# Patient Record
Sex: MALE | Race: WHITE | NOT HISPANIC OR LATINO | Employment: OTHER | URBAN - METROPOLITAN AREA
[De-identification: names, ages, dates, MRNs, and addresses within clinical notes are randomized per-mention and may not be internally consistent; named-entity substitution may affect disease eponyms.]

---

## 2017-01-05 ENCOUNTER — HOSPITAL ENCOUNTER (OUTPATIENT)
Dept: SPEECH THERAPY | Facility: CLINIC | Age: 77
Setting detail: THERAPIES SERIES
End: 2017-01-05
Attending: PSYCHIATRY & NEUROLOGY
Payer: MEDICARE

## 2017-01-05 PROCEDURE — 40000211 ZZHC STATISTIC SLP  DEPARTMENT VISIT: Performed by: SPEECH-LANGUAGE PATHOLOGIST

## 2017-01-05 PROCEDURE — 92526 ORAL FUNCTION THERAPY: CPT | Mod: GN | Performed by: SPEECH-LANGUAGE PATHOLOGIST

## 2017-01-05 PROCEDURE — 92507 TX SP LANG VOICE COMM INDIV: CPT | Mod: GN | Performed by: SPEECH-LANGUAGE PATHOLOGIST

## 2017-01-06 ENCOUNTER — HOSPITAL ENCOUNTER (OUTPATIENT)
Dept: SPEECH THERAPY | Facility: CLINIC | Age: 77
Setting detail: THERAPIES SERIES
End: 2017-01-06
Attending: PSYCHIATRY & NEUROLOGY
Payer: MEDICARE

## 2017-01-06 PROCEDURE — G9171 VOICE CURRENT STATUS: HCPCS | Mod: GN,CI | Performed by: SPEECH-LANGUAGE PATHOLOGIST

## 2017-01-06 PROCEDURE — G9172 VOICE GOAL STATUS: HCPCS | Mod: GN,CI | Performed by: SPEECH-LANGUAGE PATHOLOGIST

## 2017-01-06 PROCEDURE — 40000211 ZZHC STATISTIC SLP  DEPARTMENT VISIT: Performed by: SPEECH-LANGUAGE PATHOLOGIST

## 2017-01-06 PROCEDURE — 92507 TX SP LANG VOICE COMM INDIV: CPT | Mod: GN | Performed by: SPEECH-LANGUAGE PATHOLOGIST

## 2017-01-06 PROCEDURE — G9173 VOICE D/C STATUS: HCPCS | Mod: GN,CI | Performed by: SPEECH-LANGUAGE PATHOLOGIST

## 2017-01-06 NOTE — PROGRESS NOTES
"Outpatient Speech Language Pathology Discharge Note     Patient: Dylan Davila  : 1940    Beginning/End Dates of Reporting Period:  16 to 2017, 3 sessions since last progress note (13 sessions total during treatment period)    Referring Provider: Dr. Jose Juan Vela    Therapy Diagnosis: Voice disorder, dysphagia    Client Self Report: Patient stated, \"I feel like I have found my voice again.\"      Objective Measurements: See below:      Swallow Goals:  Goal Identifier     Goal Description Pt will complete oral pharyngeal exercises 3x/every other day independently to increase safety for PO intake.    Target Date 17   Date Met  17   Progress: Goal met and discharged. Patient is completing exercises daily as recommended.      Goal Identifier     Goal Description Patient will learn, implement, and demonstrate use of 3 safe swallow strategies independently to increase safety for PO intake.    Target Date 17   Date Met  17   Progress: Goal met and discharged. Pt is able to verbalize understanding and reports independent use of 3+ safe swallow strategies. He reports reduced coughing with PO intake as a result.      Voice Goals:  Goal Identifier     Goal Description Patient will increase vocal loudness to reach a target sound pressure level of >70 dB SPL on a complex structured speech task (e.g. paragraph reading) with min cues from SLP to increase vocal respiratory support required for functional communication.    Target Date 17   Date Met  17   Progress: Goal met and dismissed. Patient read aloud for 10 mins in final session at an average volume of 71 dB with no cues from SLP for loudness.      Goal Identifier     Goal Description Patient will increase vocal loudness to reach a target sound pressure level of >70 dB SPL on a complex unstructured speech task (e.g. 5-10 min conversation) with min cues from SLP to increase vocal respiratory support required for " functional communication.    Target Date 03/20/17   Date Met  01/06/17   Progress: Goal met and dismissed. Patient able to communicate in complex, spontaneous speech tasks (e.g. Conversation about politics) for up to 10 minutes at an average volume of 69 dB with no cues, increasing to 70dB with min cues from SLP.      Goal Identifier     Goal Description Patient will learn and implement 3 strategies to improve overall speech intelligibilty to >95% with min cues to increase communication ability for everyday speech tasks.     Target Date 03/20/17   Date Met  01/06/17   Progress: Patient's speech intelligibility informally rated by this clinician at 100% across the last 3 sessions with no cues from SLP.      Progress Toward Goals:    Progress this reporting period: Excellent. Since starting the LSVT Loud program, Mr. Davila has made excellent progress toward his voice goals. He was highly motivated in treatment sessions and completed home program exercises as directed. Patient has increased the endurance of his voice and improved vocal volume and speech clarity through LSVT training and consistent home practice. At time of discharge, average conversational volume was 69dB with no cues, increasing to 70dB with min cues across multiple complex spontaneous speech tasks. This represents an improvement over his average conversational volume of 64dB at treatment start.  Patient has increased his reading volume from 67dB at treatment start to 71dB at treatment end.  He reports improved ability to communicate in home and community settings with both familiar and unfamiliar listeners. With regard to swallowing function, patient has been completing his home exercises as directed and following through on safe swallow recommendations from SLP.  Swallowing goals were also met during treatment period. Patient completed home exercises for oralpharyngeal strengthening as directed and reported at d/c overall reduction in coughing with  PO intake.     Plan: Discharge from therapy.    Discharge: Yes.     Reason for Discharge: Patient has met all goals.    Discharge Plan: Patient to continue home program for maintenance of vocal and swallowing strength gained during treatment period. Pt advised to contact neurologist or SLP in the future if he notes increased difficulty or significant change to voice or swallow.     Thank you for this referral.  It was a pleasure to work with Ghulam on his goals.     Sabrina Arias MA, CCC-SLP  Speech-Language Pathology  [facility]  Phone: [phone]  Pager: [phone]

## 2017-02-21 DIAGNOSIS — G47.00 PERSISTENT INSOMNIA: ICD-10-CM

## 2017-02-21 DIAGNOSIS — F13.20 SEDATIVE, HYPNOTIC OR ANXIOLYTIC DEPENDENCE (H): Primary | ICD-10-CM

## 2017-02-21 NOTE — TELEPHONE ENCOUNTER
zolpidem      Last Written Prescription Date:  09/15/16  Last Fill Quantity: 30,   # refills: 5  Last Office Visit with [facility], [facility] or [facility] prescribing provider: 09/27/16  Future Office visit:       Routing refill request to provider for review/approval because:  Drug is a controlled substance

## 2017-03-13 PROBLEM — F13.20 SEDATIVE, HYPNOTIC OR ANXIOLYTIC DEPENDENCE (H): Status: ACTIVE | Noted: 2017-03-13

## 2017-03-13 RX ORDER — ZOLPIDEM TARTRATE 5 MG/1
2.5-5 TABLET ORAL
Qty: 30 TABLET | Refills: 5 | Status: SHIPPED | OUTPATIENT
Start: 2017-03-14 | End: 2017-08-17

## 2017-03-13 NOTE — TELEPHONE ENCOUNTER
rx zolpidem ambien 5mg faxed to [facility] FAX: [phone]

## 2017-08-17 DIAGNOSIS — G47.00 PERSISTENT INSOMNIA: ICD-10-CM

## 2017-08-17 DIAGNOSIS — F13.20 SEDATIVE, HYPNOTIC OR ANXIOLYTIC DEPENDENCE (H): ICD-10-CM

## 2017-08-17 NOTE — TELEPHONE ENCOUNTER
Controlled Substance Refill Request for Ambien  Last refill: 3/14/17  #30 RF 5  Last clinic visit: 9-

## 2017-08-18 RX ORDER — ZOLPIDEM TARTRATE 5 MG/1
TABLET ORAL
Qty: 30 TABLET | Refills: 1 | Status: SHIPPED | OUTPATIENT
Start: 2017-08-18 | End: 2017-10-13

## 2017-10-13 ENCOUNTER — OFFICE VISIT (OUTPATIENT)
Dept: INTERNAL MEDICINE | Facility: CLINIC | Age: 77
End: 2017-10-13
Payer: COMMERCIAL

## 2017-10-13 VITALS
OXYGEN SATURATION: 98 % | HEIGHT: 74 IN | SYSTOLIC BLOOD PRESSURE: 122 MMHG | TEMPERATURE: 98.1 F | HEART RATE: 75 BPM | WEIGHT: 199.5 LBS | BODY MASS INDEX: 25.6 KG/M2 | DIASTOLIC BLOOD PRESSURE: 78 MMHG

## 2017-10-13 DIAGNOSIS — E78.5 HYPERLIPIDEMIA LDL GOAL <100: ICD-10-CM

## 2017-10-13 DIAGNOSIS — Z23 NEED FOR PROPHYLACTIC VACCINATION AND INOCULATION AGAINST INFLUENZA: ICD-10-CM

## 2017-10-13 DIAGNOSIS — Z00.00 MEDICARE ANNUAL WELLNESS VISIT, SUBSEQUENT: Primary | ICD-10-CM

## 2017-10-13 DIAGNOSIS — M25.512 ACUTE PAIN OF LEFT SHOULDER: ICD-10-CM

## 2017-10-13 DIAGNOSIS — F51.04 CHRONIC INSOMNIA: ICD-10-CM

## 2017-10-13 DIAGNOSIS — F13.20 SEDATIVE, HYPNOTIC OR ANXIOLYTIC DEPENDENCE (H): ICD-10-CM

## 2017-10-13 DIAGNOSIS — R93.1 ABNORMAL CARDIAC CT ANGIOGRAPHY: ICD-10-CM

## 2017-10-13 DIAGNOSIS — G20.A1 PARKINSON'S DISEASE (H): ICD-10-CM

## 2017-10-13 LAB
ANION GAP SERPL CALCULATED.3IONS-SCNC: 5 MMOL/L (ref 3–14)
BUN SERPL-MCNC: 20 MG/DL (ref 7–30)
CALCIUM SERPL-MCNC: 9.9 MG/DL (ref 8.5–10.1)
CHLORIDE SERPL-SCNC: 105 MMOL/L (ref 94–109)
CHOLEST SERPL-MCNC: 140 MG/DL
CO2 SERPL-SCNC: 30 MMOL/L (ref 20–32)
CREAT SERPL-MCNC: 1.21 MG/DL (ref 0.66–1.25)
GFR SERPL CREATININE-BSD FRML MDRD: 58 ML/MIN/1.7M2
GLUCOSE SERPL-MCNC: 91 MG/DL (ref 70–99)
HDLC SERPL-MCNC: 51 MG/DL
LDLC SERPL CALC-MCNC: 69 MG/DL
NONHDLC SERPL-MCNC: 89 MG/DL
POTASSIUM SERPL-SCNC: 4.2 MMOL/L (ref 3.4–5.3)
SODIUM SERPL-SCNC: 140 MMOL/L (ref 133–144)
TRIGL SERPL-MCNC: 101 MG/DL

## 2017-10-13 PROCEDURE — 80061 LIPID PANEL: CPT | Performed by: INTERNAL MEDICINE

## 2017-10-13 PROCEDURE — G0008 ADMIN INFLUENZA VIRUS VAC: HCPCS | Performed by: INTERNAL MEDICINE

## 2017-10-13 PROCEDURE — 36415 COLL VENOUS BLD VENIPUNCTURE: CPT | Performed by: INTERNAL MEDICINE

## 2017-10-13 PROCEDURE — 90662 IIV NO PRSV INCREASED AG IM: CPT | Performed by: INTERNAL MEDICINE

## 2017-10-13 PROCEDURE — 99397 PER PM REEVAL EST PAT 65+ YR: CPT | Mod: 25 | Performed by: INTERNAL MEDICINE

## 2017-10-13 PROCEDURE — 80048 BASIC METABOLIC PNL TOTAL CA: CPT | Performed by: INTERNAL MEDICINE

## 2017-10-13 RX ORDER — SILDENAFIL 50 MG/1
50 TABLET, FILM COATED ORAL
COMMUNITY
Start: 2012-04-18 | End: 2018-02-14

## 2017-10-13 RX ORDER — KETOCONAZOLE 20 MG/G
CREAM TOPICAL PRN
COMMUNITY
Start: 2017-07-10

## 2017-10-13 RX ORDER — ZOLPIDEM TARTRATE 6.25 MG/1
6.25 TABLET, FILM COATED, EXTENDED RELEASE ORAL
Qty: 30 TABLET | Refills: 5 | Status: SHIPPED | OUTPATIENT
Start: 2017-10-13 | End: 2018-03-20

## 2017-10-13 NOTE — NURSING NOTE
"Chief Complaint   Patient presents with     Physical       Initial /78  Pulse 75  Temp 98.1  F (36.7  C) (Oral)  Ht 6' 2\" (1.88 m)  Wt 199 lb 8 oz (90.5 kg)  SpO2 98%  BMI 25.61 kg/m2 Estimated body mass index is 25.61 kg/(m^2) as calculated from the following:    Height as of this encounter: 6' 2\" (1.88 m).    Weight as of this encounter: 199 lb 8 oz (90.5 kg).  Medication Reconciliation: complete    "

## 2017-10-13 NOTE — MR AVS SNAPSHOT
After Visit Summary   10/13/2017    Dylan Davila    MRN: [id number]           Patient Information     Date Of Birth          1940        Visit Information        Provider Department      10/13/2017 7:00 AM Dereck Lomeli MD [facility]        Today's Diagnoses     Medicare annual wellness visit, subsequent    -  1    Hyperlipidemia LDL goal <100        minimal calcified plaque on CV CT angio        Parkinson's disease (H)        Sedative, hypnotic or anxiolytic dependence (H)        Chronic insomnia        Acute pain of left shoulder          Care Instructions      Preventive Health Recommendations:       Male Ages 65 and over    Yearly exam:             See your health care provider every year in order to  o   Review health changes.   o   Discuss preventive care.    o   Review your medicines if your doctor has prescribed any.    Talk with your health care provider about whether you should have a test to screen for prostate cancer (PSA).    Every 3 years, have a diabetes test (fasting glucose). If you are at risk for diabetes, you should have this test more often.    Every 5 years, have a cholesterol test. Have this test more often if you are at risk for high cholesterol or heart disease.     Every 10 years, have a colonoscopy. Or, have a yearly FIT test (stool test). These exams will check for colon cancer.    Talk to with your health care provider about screening for Abdominal Aortic Aneurysm if you have a family history of AAA or have a history of smoking.  Shots:     Get a flu shot each year.     Get a tetanus shot every 10 years.     Talk to your doctor about your pneumonia vaccines. There are now two you should receive - Pneumovax (PPSV 23) and Prevnar (PCV 13).    Talk to your doctor about a shingles vaccine.     Talk to your doctor about the hepatitis B vaccine.  Nutrition:     Eat at least 5 servings of fruits and vegetables each day.     Eat  whole-grain bread, whole-wheat pasta and brown rice instead of white grains and rice.     Talk to your doctor about Calcium and Vitamin D.   Lifestyle    Exercise for at least 150 minutes a week (30 minutes a day, 5 days a week). This will help you control your weight and prevent disease.     Limit alcohol to one drink per day.     No smoking.     Wear sunscreen to prevent skin cancer.     See your dentist every six months for an exam and cleaning.     See your eye doctor every 1 to 2 years to screen for conditions such as glaucoma, macular degeneration and cataracts.          Follow-ups after your visit        Additional Services     [facility] PT, HAND, AND CHIROPRACTIC REFERRAL       **This order will print in the [facility] Scheduling Office**    Physical Therapy, Hand Therapy and Chiropractic Care are available through:    *[facility]  *[facility]  *[facility]    Call one number to schedule at any of the above locations: [phone].    Your provider has referred you to: Physical Therapy at [facility] or [facility]    Indication/Reason for Referral: Shoulder Pain  Onset of Illness: wks  Therapy Orders: Evaluate and Treat  Special Programs: None  Special Request: None    Binu Schmid      Additional Comments for the Therapist or Chiropractor:     Please be aware that coverage of these services is subject to the terms and limitations of your health insurance plan.  Call member services at your health plan with any benefit or coverage questions.      Please bring the following to your appointment:    *Your personal calendar for scheduling future appointments  *Comfortable clothing                  Your next 10 appointments already scheduled     Dec 06, 2017  8:20 AM CST   (Arrive by 8:00 AM)   Return Visit with Frandy Christiansen MD   [facility]    [address]  [address]  [facility] [id number] [phone]  "             Who to contact     If you have questions or need follow up information about today's clinic visit or your schedule please contact [facility] directly at [phone].  Normal or non-critical lab and imaging results will be communicated to you by MyChart, letter or phone within 4 business days after the clinic has received the results. If you do not hear from us within 7 days, please contact the clinic through MyChart or phone. If you have a critical or abnormal lab result, we will notify you by phone as soon as possible.  Submit refill requests through [employer] or call your pharmacy and they will forward the refill request to us. Please allow 3 business days for your refill to be completed.          Additional Information About Your Visit        fypiohart Information     [employer] gives you secure access to your electronic health record. If you see a primary care provider, you can also send messages to your care team and make appointments. If you have questions, please call your primary care clinic.  If you do not have a primary care provider, please call [phone] and they will assist you.        Care EveryWhere ID     This is your Care EveryWhere ID. This could be used by other organizations to access your [place] medical records  [id number]        Your Vitals Were     Pulse Temperature Height Pulse Oximetry BMI (Body Mass Index)       75 98.1  F (36.7  C) (Oral) 6' 2\" (1.88 m) 98% 25.61 kg/m2        Blood Pressure from Last 3 Encounters:   10/13/17 122/78   12/02/16 132/70   09/27/16 120/78    Weight from Last 3 Encounters:   10/13/17 199 lb 8 oz (90.5 kg)   12/02/16 205 lb (93 kg)   09/27/16 204 lb (92.5 kg)              We Performed the Following     Basic metabolic panel     [facility] PT, HAND, AND CHIROPRACTIC REFERRAL     Lipid panel reflex to direct LDL          Today's Medication Changes          These changes are accurate as of: 10/13/17  7:45 AM.  If you have any " questions, ask your nurse or doctor.               Start taking these medicines.        Dose/Directions    zolpidem 6.25 MG CR tablet   Commonly known as:  AMBIEN CR   Used for:  Sedative, hypnotic or anxiolytic dependence (H), Chronic insomnia   Replaces:  zolpidem 5 MG tablet   Started by:  Dereck Lomeli MD        Dose:  6.25 mg   Take 1 tablet (6.25 mg) by mouth nightly as needed for sleep   Quantity:  30 tablet   Refills:  5         Stop taking these medicines if you haven't already. Please contact your care team if you have questions.     zolpidem 5 MG tablet   Commonly known as:  AMBIEN   Replaced by:  zolpidem 6.25 MG CR tablet   Stopped by:  Dereck Lomeli MD                Where to get your medicines      Some of these will need a paper prescription and others can be bought over the counter.  Ask your nurse if you have questions.     Bring a paper prescription for each of these medications     zolpidem 6.25 MG CR tablet                Primary Care Provider Office Phone # Fax #    Dereck Lomeli [phone]       [address] [address]        Equal Access to Services     [facility] Hospital[facility]: Hadii lynda ku hadasho Soomaali, waaxda luqadaha, qaybta kaalmada adeegyada, wayne potter . So [facility] [phone].    ATENCIÓN: Si habla español, tiene a young disposición servicios gratuitos de asistencia lingüística. [facility] [phone].    We comply with applicable federal civil rights laws and [place] laws. We do not discriminate on the basis of race, color, national origin, age, disability, sex, sexual orientation, or gender identity.            Thank you!     Thank you for choosing [facility]  for your care. Our goal is always to provide you with excellent care. Hearing back from our patients is one way we can continue to improve our services. Please take a few minutes to complete the written survey that you may receive  in the mail after your visit with us. Thank you!             Your Updated Medication List - Protect others around you: Learn how to safely use, store and throw away your medicines at www.disposemymeds.org.          This list is accurate as of: 10/13/17  7:45 AM.  Always use your most recent med list.                   Brand Name Dispense Instructions for use Diagnosis    aspirin 81 MG tablet     100    1 tab po QD (Once per day)        atorvastatin 10 MG tablet    LIPITOR    90 tablet    Take 1 tablet (10 mg) by mouth At Bedtime    Hyperlipidemia LDL goal <100, Abnormal cardiac CT angiography       AVODART 0.5 MG capsule   Generic drug:  dutasteride     3 months    1 CAPSULE DAILY    Urinary sys symptom NEC       co-enzyme Q-10 50 MG Caps      Take  by mouth.        ketoconazole 2 % cream    NIZORAL          metroNIDAZOLE 0.75 % cream    METROCREAM          MULTIVITAMIN TABS   OR      1 tab qd        * sildenafil 100 MG tablet    VIAGRA    7 tablet    Take  by mouth daily as needed for erectile dysfunction. 0.5 to 1    Impotence of organic origin       * sildenafil 50 MG tablet    VIAGRA     Take 50 mg by mouth        zolpidem 6.25 MG CR tablet    AMBIEN CR    30 tablet    Take 1 tablet (6.25 mg) by mouth nightly as needed for sleep    Sedative, hypnotic or anxiolytic dependence (H), Chronic insomnia       * Notice:  This list has 2 medication(s) that are the same as other medications prescribed for you. Read the directions carefully, and ask your doctor or other care provider to review them with you.

## 2017-10-13 NOTE — PROGRESS NOTES
SUBJECTIVE:   Dylan Davila is a 76 year old male who presents for Preventive Visit.  Are you in the first 12 months of your Medicare coverage?  No    Physical   Annual:     Getting at least 3 servings of Calcium per day::  Yes    Bi-annual eye exam::  Yes    Dental care twice a year::  Yes    Sleep apnea or symptoms of sleep apnea::  None    Diet::  Regular (no restrictions)    Frequency of exercise::  2-3 days/week    Duration of exercise::  Less than 15 minutes    Taking medications regularly::  Yes    Medication side effects::  Not applicable    Additional concerns today::  YES      COGNITIVE SCREEN  1) Repeat 3 items (Banana, Sunrise, Chair)    2) Clock draw: NORMAL  3) 3 item recall: Recalls 3 objects  Results: 3 items recalled: COGNITIVE IMPAIRMENT LESS LIKELY    Mini-CogTM Copyright S Kiesha. Licensed by the author for use in [facility] Furn[employer]co[employer]uk; reprinted with permission (brant@[facility]). All rights reserved.          Reviewed and updated as needed this visit by clinical staff  Tobacco  Allergies  Meds  Problems       Reviewed and updated as needed this visit by Provider  Allergies  Meds  Problems        Social History   Substance Use Topics     Smoking status: Former Smoker     Years: 3.00     Types: Pipe     Quit date: 1/1/1975     Smokeless tobacco: Never Used     Alcohol use 0.0 oz/week     0 Standard drinks or equivalent per week      Comment: 1-2 glasses wine socially       The patient does not drink >3 drinks per day nor >7 drinks per week.      Today's PHQ-2 Score:   PHQ-2 ( 1999 Pfizer) 10/10/2017   Q1: Little interest or pleasure in doing things 0   Q2: Feeling down, depressed or hopeless 0   PHQ-2 Score 0   Q1: Little interest or pleasure in doing things Not at all   Q2: Feeling down, depressed or hopeless Not at all   PHQ-2 Score 0       Do you feel safe in your environment - Yes    Do you have a Health Care Directive?: No: Advance care planning reviewed with patient; information  "given to patient to review.      Current providers sharing in care for this patient include: Patient Care Team:  Dereck Lomeli MD as PCP - General (Internal Medicine)      Hearing impairment: No    Ability to successfully perform activities of daily living: Yes, no assistance needed     Fall risk:  Fallen 2 or more times in the past year?: No  Any fall with injury in the past year?: No      Home safety:  none identified      The following health maintenance items are reviewed in Epic and correct as of today:  Health Maintenance   Topic Date Due     AORTIC ANEURYSM SCREENING (SYSTEM ASSIGNED)  11/17/2006     FALL RISK ASSESSMENT  12/17/2016     ADVANCE DIRECTIVE PLANNING Q5 YRS  07/18/2017     TETANUS Q10 YR  08/14/2017     INFLUENZA VACCINE (SYSTEM ASSIGNED)  09/01/2017     ALT Q1 YR  09/27/2017     BMP Q1 YR  09/27/2017     LIPID MONITORING Q1 YEAR  09/27/2017     COLON CANCER SCREEN (SYSTEM ASSIGNED)  07/12/2021     PNEUMOCOCCAL  Completed             ROS:  Constitutional, HEENT, cardiovascular, pulmonary, gi and gu systems are negative, except as otherwise noted.  MSK- pain in L shoulder , w/movement     OBJECTIVE:   /78  Pulse 75  Temp 98.1  F (36.7  C) (Oral)  Ht 6' 2\" (1.88 m)  Wt 199 lb 8 oz (90.5 kg)  SpO2 98%  BMI 25.61 kg/m2 Estimated body mass index is 25.61 kg/(m^2) as calculated from the following:    Height as of this encounter: 6' 2\" (1.88 m).    Weight as of this encounter: 199 lb 8 oz (90.5 kg).  EXAM:   GENERAL: alert and no distress  EYES: Eyes grossly normal to inspection, PERRL and conjunctivae and sclerae normal  HENT: ear canals and TM's normal, nose and mouth without ulcers or lesions  NECK: no adenopathy, no asymmetry, masses, or scars and thyroid normal to palpation  RESP: lungs clear to auscultation - no rales, rhonchi or wheezes  CV: regular rate and rhythm, normal S1 S2, no S3 or S4, no murmur, click or rub, no peripheral edema and peripheral pulses strong  ABDOMEN: " soft, nontender, no hepatosplenomegaly, no masses and bowel sounds normal  MS: no gross musculoskeletal defects noted, no edema  SKIN: no suspicious lesions or rashes  NEURO: LUE cogwheel rigidity noted- a bit worse. Resting tremor on left. Gait normal.  sensory exam grossly normal and mentation intact  PSYCH: mentation appears normal, affect normal/bright    Results for orders placed or performed in visit on 10/13/17   Basic metabolic panel   Result Value Ref Range    Sodium 140 133 - 144 mmol/L    Potassium 4.2 3.4 - 5.3 mmol/L    Chloride 105 94 - 109 mmol/L    Carbon Dioxide 30 20 - 32 mmol/L    Anion Gap 5 3 - 14 mmol/L    Glucose 91 70 - 99 mg/dL    Urea Nitrogen 20 7 - 30 mg/dL    Creatinine 1.21 0.66 - 1.25 mg/dL    GFR Estimate 58 (L) >60 mL/min/1.7m2    GFR Estimate If Black 71 >60 mL/min/1.7m2    Calcium 9.9 8.5 - 10.1 mg/dL   Lipid panel reflex to direct LDL   Result Value Ref Range    Cholesterol 140 <200 mg/dL    Triglycerides 101 <150 mg/dL    HDL Cholesterol 51 >39 mg/dL    LDL Cholesterol Calculated 69 <100 mg/dL    Non HDL Cholesterol 89 <130 mg/dL      ASSESSMENT / PLAN:       ICD-10-CM    1. Medicare annual wellness visit, subsequent Z00.00    2. Hyperlipidemia LDL goal <100 E78.5 Lipid panel reflex to direct LDL     atorvastatin (LIPITOR) 10 MG tablet   3. minimal calcified plaque on CV CT angio R93.1 Basic metabolic panel     atorvastatin (LIPITOR) 10 MG tablet   4. Parkinson's disease (H) G20    5. Sedative, hypnotic or anxiolytic dependence (H) F13.20 zolpidem (AMBIEN CR) 6.25 MG CR tablet   6. Chronic insomnia F51.04 zolpidem (AMBIEN CR) 6.25 MG CR tablet   7. Acute pain of left shoulder M25.512 [facility], HAND, AND CHIROPRACTIC REFERRAL   8. Need for prophylactic vaccination and inoculation against influenza Z23 FLU VACCINE, INCREASED ANTIGEN, PRESV FREE, AGE 65+ [34661]     ADMIN INFLUENZA (For MEDICARE Patients ONLY) []     - change ambien to ambien CR due to ineffectiveness  - continue  "statin  - shoulder consistent with tendonitis , maybe OA though seems less likely. See PT as a first line rx option  - see neuro. Would like him to see them annually given his reluctance to start rx for his parkinson's.     End of Life Planning:  Patient currently has an advanced directive: Yes.  Practitioner is supportive of decision.    COUNSELING:  Reviewed preventive health counseling, as reflected in patient instructions        Estimated body mass index is 25.61 kg/(m^2) as calculated from the following:    Height as of this encounter: 6' 2\" (1.88 m).    Weight as of this encounter: 199 lb 8 oz (90.5 kg).     reports that he quit smoking about 42 years ago. His smoking use included Pipe. He quit after 3.00 years of use. He has never used smokeless tobacco.    Appropriate preventive services were discussed with this patient, including applicable screening as appropriate for cardiovascular disease, diabetes, osteopenia/osteoporosis, and glaucoma.  As appropriate for age/gender, discussed screening for colorectal cancer, prostate cancer, breast cancer, and cervical cancer. Checklist reviewing preventive services available has been given to the patient.    Reviewed patients plan of care and provided an AVS. The Basic Care Plan (routine screening as documented in Health Maintenance) for Dylan meets the Care Plan requirement. This Care Plan has been established and reviewed with the Patient.    Counseling Resources:  ATP IV Guidelines  Pooled Cohorts Equation Calculator  Breast Cancer Risk Calculator  FRAX Risk Assessment  ICSI Preventive Guidelines  Dietary Guidelines for Americans, 2010  USDA's MyPlate  ASA Prophylaxis  Lung CA Screening    Dereck Lomeli MD  [facility]  Answers for HPI/ROS submitted by the patient on 10/10/2017   PHQ-2 Score: 0    "

## 2017-10-13 NOTE — PROGRESS NOTES
Injectable Influenza Immunization Documentation    1.  Is the person to be vaccinated sick today?   No    2. Does the person to be vaccinated have an allergy to a component   of the vaccine?   No    3. Has the person to be vaccinated ever had a serious reaction   to influenza vaccine in the past?   No    4. Has the person to be vaccinated ever had Guillain-Barré syndrome?   No    Form completed by Sania Waldron CMA

## 2017-10-15 RX ORDER — ATORVASTATIN CALCIUM 10 MG/1
10 TABLET, FILM COATED ORAL AT BEDTIME
Qty: 90 TABLET | Refills: 3 | Status: SHIPPED | OUTPATIENT
Start: 2017-10-15 | End: 2018-10-16

## 2017-10-17 ENCOUNTER — THERAPY VISIT (OUTPATIENT)
Dept: PHYSICAL THERAPY | Facility: CLINIC | Age: 77
End: 2017-10-17
Payer: MEDICARE

## 2017-10-17 DIAGNOSIS — M25.512 ACUTE PAIN OF LEFT SHOULDER: Primary | ICD-10-CM

## 2017-10-17 PROCEDURE — G8982 BODY POS GOAL STATUS: HCPCS | Mod: GP | Performed by: PHYSICAL THERAPIST

## 2017-10-17 PROCEDURE — 97110 THERAPEUTIC EXERCISES: CPT | Mod: GP | Performed by: PHYSICAL THERAPIST

## 2017-10-17 PROCEDURE — 97161 PT EVAL LOW COMPLEX 20 MIN: CPT | Mod: GP | Performed by: PHYSICAL THERAPIST

## 2017-10-17 PROCEDURE — G8981 BODY POS CURRENT STATUS: HCPCS | Mod: GP | Performed by: PHYSICAL THERAPIST

## 2017-10-17 NOTE — PROGRESS NOTES
Subjective:    Patient is a 76 year old male presenting with rehab left shoulder hpi. The history is provided by the patient.   Dylan Davila is a 76 year old male with a left shoulder condition.  Condition occurred with:  Lifting.  Condition occurred: during recreation/sport.  This is a new condition  Onset of L shoulder pain 09/17/2017 when he started using 10# weights to do overhead lifting and lateral arm raises.  Just started noticing the pain after a week or two of doing the exercises.  He has stopped doing the aggravating exercises now for about 2 weeks.    Patient reports pain:  Anterior.    Pain is described as aching and sharp and is intermittent and reported as 4/10.  Associated symptoms:  Loss of motion/stiffness. Pain is the same all the time.  Exacerbated by: reaching overhead, reaching behind the back, only able to lie on L side for 15 minutes, and relieved by rest and other (avoiding aggravating positions and movement, hot shower).  Since onset symptoms are unchanged.  Special testing: none.  Previous treatment: none.    General health as reported by patient is fair.  Pertinent medical history includes:  Other (Parkinson's disease).  Medical allergies: no.  Other surgeries include:  None reported.  Current medications:  Sleep medication and other (cholesterol).  Current occupation is retired.        Barriers include:  None as reported by the patient.    Red flags:  None as reported by the patient.                        Objective:    Standing Alignment:      Shoulder/UE:  Rounded shoulders, protracted scapula L and protracted scapula R                                       Shoulder Evaluation:  ROM:  AROM:    Flexion:  Left:  105    Right:  125    Abduction:  Left: 80   Right:  125      External Rotation:  Left:  52    Right:  70            Extension/Internal Rotation:  Left:  L1    Right:  T10      Pain: repeated shoulder extension by PT and with wand--increased L shoulder flexion, abduction,  and IR/ext ROM after--decreased pain flexion, NE abduction and IR/ext pain    Strength:    Flexion: Left:5/5    Pain: -        Abduction:  Left: 5-/5   Pain:-        Internal Rotation:  Left:5/5      Pain:-      External Rotation:   Left:5-/5      Pain:-                                                      General     ROS    Assessment/Plan:      Patient is a 76 year old male with left side shoulder complaints.  Provisional classification of derangement with directional preference for extension.  He had good increases in ROM in flexion, abduction, and IR/extension after repeated shoulder extension by PT and with wand.  Pain with flexion was also reduced.  He will perform repeated wand extension at home to assess further.  He should make good progress with directional preference exercises to improve shoulder mechanics, decrease pain, and improve overall mobility and function.      Patient has the following significant findings with corresponding treatment plan.                Diagnosis 1:  L shoulder pain  Pain -  self management, education, directional preference exercise and home program  Decreased ROM/flexibility - therapeutic exercise and home program  Decreased strength - therapeutic exercise, therapeutic activities and home program  Decreased function - therapeutic activities and home program  Impaired posture - neuro re-education and home program    Therapy Evaluation Codes:   1) History comprised of:   Personal factors that impact the plan of care:      None.    Comorbidity factors that impact the plan of care are:      Parkinson's disease.     Medications impacting care: Parkinson's meds.  2) Examination of Body Systems comprised of:   Body structures and functions that impact the plan of care:      Shoulder.   Activity limitations that impact the plan of care are:      Bathing, Dressing, Sleeping, Laying down and reaching.  3) Clinical presentation characteristics  are:   Stable/Uncomplicated.  4) Decision-Making    Low complexity using standardized patient assessment instrument and/or measureable assessment of functional outcome.  Cumulative Therapy Evaluation is: Low complexity.    Previous and current functional limitations:  (See Goal Flow Sheet for this information)    Short term and Long term goals: (See Goal Flow Sheet for this information)     Communication ability:  Patient appears to be able to clearly communicate and understand verbal and written communication and follow directions correctly.  Treatment Explanation - The following has been discussed with the patient:   RX ordered/plan of care  Anticipated outcomes  Possible risks and side effects  This patient would benefit from PT intervention to resume normal activities.   Rehab potential is good.    Frequency:  1 X week, once daily  Duration:  for 4 weeks tapering to 2 X a month over 1 month  Discharge Plan:  Achieve all LTG.  Independent in home treatment program.  Reach maximal therapeutic benefit.    Please refer to the daily flowsheet for treatment today, total treatment time and time spent performing 1:1 timed codes.

## 2017-10-17 NOTE — LETTER
DEPARTMENT OF HEALTH AND HUMAN SERVICES  CENTERS FOR MEDICARE & MEDICAID SERVICES    PLAN/UPDATED PLAN OF PROGRESS FOR OUTPATIENT REHABILITATION    PATIENTS NAME:  Dylan Davila   : 1940  PROVIDER NUMBER:    [id number]  [id number]  PROVIDER NAME: [facility]  MEDICAL RECORD NUMBER: [id number]   START OF CARE DATE:  SOC Date: 10/17/17   TYPE:  PT  PRIMARY/TREATMENT DIAGNOSIS: (Pertinent Medical Diagnosis)  Acute pain of left shoulder    VISITS FROM START OF CARE:  Rxs Used: 1     Subjective:  Dylan Davila is a 76 year old male with a left shoulder condition.  Condition occurred with:  Lifting.  Condition occurred: during recreation/sport.  This is a new condition  Onset of L shoulder pain 2017 when he started using 10# weights to do overhead lifting and lateral arm raises.  Just started noticing the pain after a week or two of doing the exercises.  He has stopped doing the aggravating exercises now for about 2 weeks.  Patient reports pain:  Anterior.  Pain is described as aching and sharp and is intermittent and reported as 4/10.  Associated symptoms:  Loss of motion/stiffness. Pain is the same all the time.  Exacerbated by: reaching overhead, reaching behind the back, only able to lie on L side for 15 minutes, and relieved by rest and other (avoiding aggravating positions and movement, hot shower).  Since onset symptoms are unchanged.  Special testing: none.  Previous treatment: none.  General health as reported by patient is fair.  Pertinent medical history includes:  Other (Parkinson's disease).  Medical allergies: no.  Other surgeries include:  None reported.  Current medications:  Sleep medication and other (cholesterol).  Current occupation is retired.      Barriers include:  None as reported by the patient.  Red flags:  None as reported by the patient.    Objective:  Standing Alignment:    Shoulder/UE:  Rounded shoulders,  protracted scapula L and protracted scapula R  Shoulder Evaluation:  ROM:  AROM:    Flexion:  Left:  105    Right:  125  Abduction:  Left: 80   Right:  125  External Rotation:  Left:  52    Right:  70  Extension/Internal Rotation:  Left:  L1    Right:  T10    Pain: repeated shoulder extension by PT and with wand--increased L shoulder flexion, abduction, and IR/ext ROM after--decreased pain flexion, NE abduction and IR/ext pain  Strength:    Flexion: Left:5/5    Pain: -      Abduction:  Left: 5-/5   Pain:-      Internal Rotation:  Left:5/5      Pain:-      External Rotation:   Left:5-/5      Pain:-       ROS  Assessment/Plan:    Patient is a 76 year old male with left side shoulder complaints.  Provisional classification of derangement with directional preference for extension.  He had good increases in ROM in flexion, abduction, and IR/extension after repeated shoulder extension by PT and with wand.  Pain with flexion was also reduced.  He will perform repeated wand extension at home to assess further.  He should make good progress with directional preference exercises to improve shoulder mechanics, decrease pain, and improve overall mobility and function.      Patient has the following significant findings with corresponding treatment plan.                Diagnosis 1:  L shoulder pain  Pain -  self management, education, directional preference exercise and home program  Decreased ROM/flexibility - therapeutic exercise and home program  Decreased strength - therapeutic exercise, therapeutic activities and home program  Decreased function - therapeutic activities and home program  Impaired posture - neuro re-education and home program    Therapy Evaluation Codes:   1) History comprised of:   Personal factors that impact the plan of care:      None.    Comorbidity factors that impact the plan of care are:      Parkinson's disease.     Medications impacting care: Parkinson's meds.  2) Examination of Body Systems  "comprised of:   Body structures and functions that impact the plan of care:      Shoulder.   Activity limitations that impact the plan of care are:      Bathing, Dressing, Sleeping, Laying down and reaching.  3) Clinical presentation characteristics are:   Stable/Uncomplicated.  4) Decision-Making    Low complexity using standardized patient assessment instrument and/or   measureable assessment of functional outcome.  Cumulative Therapy Evaluation is: Low complexity.    Previous and current functional limitations:  (See Goal Flow Sheet for this information)    Short term and Long term goals: (See Goal Flow Sheet for this information)     Communication ability:  Patient appears to be able to clearly communicate and understand verbal and written communication and follow directions correctly.  Treatment Explanation - The following has been discussed with the patient:   RX ordered/plan of care  Anticipated outcomes  Possible risks and side effects  This patient would benefit from PT intervention to resume normal activities.   Rehab potential is good.    Frequency:  1 X week, once daily  Duration:  for 4 weeks tapering to 2 X a month over 1 month  Discharge Plan:  Achieve all LTG.  Independent in home treatment program.  Reach maximal therapeutic benefit.    Caregiver Signature/Credentials _____________________________ Date ________       Treating Provider: Sonia Santos, PT   I have reviewed and certified the need for these services and plan of treatment while under my care.        PHYSICIAN'S SIGNATURE:   __________________________________  Date___________                             Dereck Lomeli MD    Certification period:  Beginning of Cert date period: 10/17/17 to  End of Cert period date: 01/14/18     Functional Level Progress Report: Please see attached \"Goal Flow sheet for Functional level.\"    ____X____ Continue Services or       ________ DC Services                Service dates: From  SOC Date: 10/17/17 date " to present

## 2017-10-17 NOTE — MR AVS SNAPSHOT
After Visit Summary   10/17/2017    Dylan Davila    MRN: [id number]           Patient Information     Date Of Birth          1940        Visit Information        Provider Department      10/17/2017 9:40 AM Sonia Santos PT [facility]        Today's Diagnoses     Acute pain of left shoulder    -  1       Follow-ups after your visit        Your next 10 appointments already scheduled     Oct 25, 2017  9:30 AM CDT   MILEY Extremity with Sonia Santos PT   [facility] ([facility]  )    [address] [place] [address] [address]  [facility] [address]   [phone]            Nov 01, 2017  9:30 AM CDT   MILEY Extremity with Sonia Santos PT   [facility] ([facility]  )    [address] [place] [address] [address]  [facility] [address]   [phone]            Dec 06, 2017  8:20 AM CST   (Arrive by 8:00 AM)   Return Visit with Frandy Christiansen MD   [facility] ([facility])    [address]  [address]  [facility] [id number] [phone]              Who to contact     If you have questions or need follow up information about today's clinic visit or your schedule please contact [facility] directly at [phone].  Normal or non-critical lab and imaging results will be communicated to you by MyChart, letter or phone within 4 business days after the clinic has received the results. If you do not hear from us within 7 days, please contact the clinic through MyChart or phone. If you have a critical or abnormal lab result, we will notify you by phone as soon as possible.  Submit refill requests through [employer] or call your pharmacy and they will forward the refill request to us. Please allow 3 business days for your refill to be completed.          Additional  Information About Your Visit        [employer] Information     [employer] gives you secure access to your electronic health record. If you see a primary care provider, you can also send messages to your care team and make appointments. If you have questions, please call your primary care clinic.  If you do not have a primary care provider, please call [phone] and they will assist you.        Care EveryWhere ID     This is your Care EveryWhere ID. This could be used by other organizations to access your [facility] medical records  [id number]         Blood Pressure from Last 3 Encounters:   10/13/17 122/78   12/02/16 132/70   09/27/16 120/78    Weight from Last 3 Encounters:   10/13/17 90.5 kg (199 lb 8 oz)   12/02/16 93 kg (205 lb)   09/27/16 92.5 kg (204 lb)              We Performed the Following     MILEY CERT REPORT     MILEY Inital Eval Report     PT Eval, Low Complexity (42289)     Therapeutic Exercises        Primary Care Provider Office Phone # Fax #    Dereck Lomeli [phone]       [address] [place] [address]        Equal Access to Services     [facility] Campus[facility]UL[facility] : Hadii aad ku hadasho Soleydiali, waaxda luqadaha, qaybta kaalmada adeegyada, wayne potter . So [facility] [phone].    ATENCIÓN: Si habla español, tiene a young disposición servicios gratemilyos de asistencia lingüística. [facility] [phone].    We comply with applicable federal civil rights laws and [place] laws. We do not discriminate on the basis of race, color, national origin, age, disability, sex, sexual orientation, or gender identity.            Thank you!     Thank you for choosing [facility]  for your care. Our goal is always to provide you with excellent care. Hearing back from our patients is one way we can continue to improve our services. Please take a few minutes to complete the written survey that you may receive in the mail after  your visit with us. Thank you!             Your Updated Medication List - Protect others around you: Learn how to safely use, store and throw away your medicines at www.disposemymeds.org.          This list is accurate as of: 10/17/17  4:53 PM.  Always use your most recent med list.                   Brand Name Dispense Instructions for use Diagnosis    aspirin 81 MG tablet     100    1 tab po QD (Once per day)        atorvastatin 10 MG tablet    LIPITOR    90 tablet    Take 1 tablet (10 mg) by mouth At Bedtime    Hyperlipidemia LDL goal <100, Abnormal cardiac CT angiography       AVODART 0.5 MG capsule   Generic drug:  dutasteride     3 months    1 CAPSULE DAILY    Urinary sys symptom NEC       co-enzyme Q-10 50 MG Caps      Take  by mouth.        ketoconazole 2 % cream    NIZORAL          metroNIDAZOLE 0.75 % cream    METROCREAM          MULTIVITAMIN TABS   OR      1 tab qd        * sildenafil 100 MG tablet    VIAGRA    7 tablet    Take  by mouth daily as needed for erectile dysfunction. 0.5 to 1    Impotence of organic origin       * sildenafil 50 MG tablet    VIAGRA     Take 50 mg by mouth        zolpidem 6.25 MG CR tablet    AMBIEN CR    30 tablet    Take 1 tablet (6.25 mg) by mouth nightly as needed for sleep    Sedative, hypnotic or anxiolytic dependence (H), Chronic insomnia       * Notice:  This list has 2 medication(s) that are the same as other medications prescribed for you. Read the directions carefully, and ask your doctor or other care provider to review them with you.

## 2017-10-25 ENCOUNTER — THERAPY VISIT (OUTPATIENT)
Dept: PHYSICAL THERAPY | Facility: CLINIC | Age: 77
End: 2017-10-25
Payer: MEDICARE

## 2017-10-25 DIAGNOSIS — M25.512 ACUTE PAIN OF LEFT SHOULDER: ICD-10-CM

## 2017-10-25 PROCEDURE — 97110 THERAPEUTIC EXERCISES: CPT | Mod: GP | Performed by: PHYSICAL THERAPIST

## 2017-11-01 ENCOUNTER — MYC MEDICAL ADVICE (OUTPATIENT)
Dept: INTERNAL MEDICINE | Facility: CLINIC | Age: 77
End: 2017-11-01

## 2017-11-01 ENCOUNTER — THERAPY VISIT (OUTPATIENT)
Dept: PHYSICAL THERAPY | Facility: CLINIC | Age: 77
End: 2017-11-01
Payer: MEDICARE

## 2017-11-01 DIAGNOSIS — M25.512 ACUTE PAIN OF LEFT SHOULDER: ICD-10-CM

## 2017-11-01 PROCEDURE — 97112 NEUROMUSCULAR REEDUCATION: CPT | Mod: GP | Performed by: PHYSICAL THERAPIST

## 2017-11-01 PROCEDURE — 97110 THERAPEUTIC EXERCISES: CPT | Mod: GP | Performed by: PHYSICAL THERAPIST

## 2017-11-28 ENCOUNTER — THERAPY VISIT (OUTPATIENT)
Dept: PHYSICAL THERAPY | Facility: CLINIC | Age: 77
End: 2017-11-28
Payer: MEDICARE

## 2017-11-28 DIAGNOSIS — M25.512 ACUTE PAIN OF LEFT SHOULDER: ICD-10-CM

## 2017-11-28 PROCEDURE — 97112 NEUROMUSCULAR REEDUCATION: CPT | Mod: GP | Performed by: PHYSICAL THERAPIST

## 2017-11-28 PROCEDURE — 97110 THERAPEUTIC EXERCISES: CPT | Mod: GP | Performed by: PHYSICAL THERAPIST

## 2017-12-07 DIAGNOSIS — N52.9 IMPOTENCE OF ORGANIC ORIGIN: Primary | ICD-10-CM

## 2017-12-07 RX ORDER — SILDENAFIL 100 MG/1
100 TABLET, FILM COATED ORAL DAILY PRN
Qty: 12 TABLET | Refills: 11 | Status: SHIPPED | OUTPATIENT
Start: 2017-12-07 | End: 2018-02-14

## 2017-12-08 ENCOUNTER — TELEPHONE (OUTPATIENT)
Dept: UROLOGY | Facility: CLINIC | Age: 77
End: 2017-12-08

## 2017-12-15 DIAGNOSIS — N40.0 BPH (BENIGN PROSTATIC HYPERPLASIA): Primary | ICD-10-CM

## 2017-12-15 RX ORDER — DUTASTERIDE 0.5 MG/1
0.5 CAPSULE, LIQUID FILLED ORAL DAILY
Qty: 90 CAPSULE | Refills: 3 | Status: SHIPPED | OUTPATIENT
Start: 2017-12-15 | End: 2019-08-16

## 2017-12-22 ENCOUNTER — OFFICE VISIT (OUTPATIENT)
Dept: UROLOGY | Facility: CLINIC | Age: 77
End: 2017-12-22
Payer: COMMERCIAL

## 2017-12-22 VITALS
BODY MASS INDEX: 25.67 KG/M2 | WEIGHT: 200 LBS | HEIGHT: 74 IN | HEART RATE: 70 BPM | SYSTOLIC BLOOD PRESSURE: 132 MMHG | DIASTOLIC BLOOD PRESSURE: 80 MMHG | OXYGEN SATURATION: 97 %

## 2017-12-22 DIAGNOSIS — N39.41 URGE INCONTINENCE OF URINE: ICD-10-CM

## 2017-12-22 DIAGNOSIS — N40.0 BENIGN PROSTATIC HYPERPLASIA WITHOUT LOWER URINARY TRACT SYMPTOMS: Primary | ICD-10-CM

## 2017-12-22 LAB — PSA SERPL-MCNC: 0.89 NG/ML (ref 0–4)

## 2017-12-22 PROCEDURE — 84153 ASSAY OF PSA TOTAL: CPT | Performed by: UROLOGY

## 2017-12-22 PROCEDURE — 99213 OFFICE O/P EST LOW 20 MIN: CPT | Performed by: UROLOGY

## 2017-12-22 PROCEDURE — 81003 URINALYSIS AUTO W/O SCOPE: CPT | Performed by: UROLOGY

## 2017-12-22 PROCEDURE — 36415 COLL VENOUS BLD VENIPUNCTURE: CPT | Performed by: UROLOGY

## 2017-12-22 RX ORDER — TOLTERODINE 4 MG/1
4 CAPSULE, EXTENDED RELEASE ORAL DAILY
Qty: 90 CAPSULE | Refills: 3 | Status: SHIPPED | OUTPATIENT
Start: 2017-12-22 | End: 2018-10-16

## 2017-12-22 ASSESSMENT — PAIN SCALES - GENERAL: PAINLEVEL: NO PAIN (0)

## 2017-12-22 NOTE — PROGRESS NOTES
Dylan Davila is a 77-year-old male with a family history of prostate cancer and Parkinson's disease.  His PSA is 0.89  His current urinary symptoms are worsening urinary urgency and urge incontinence, no dysuria or hematuria    Other past medical history: Basal cell carcinoma, melanoma, BPH, lumbar disc disease, ED, osteopenia, hyperlipidemia, history of pneumonia, resting tremor, hearing loss, former smoker  Medications: Low-dose aspirin, Lipitor, Avodart, coenzyme Q10, metronidazole cream, ketoconazole cream, multivitamin, Viagra, Ambien  Allergies: Calcium, codeine, Flomax  Exam: Masked facies, alert and oriented, normal vital signs. Normal respirations. 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 Tolterodine ER 4 mg q.a.m.

## 2017-12-22 NOTE — NURSING NOTE
Chief Complaint   Patient presents with     Urinary Problem     Patient here today for SD PSA and Exam hx of some Urgencey and Urge Incontinence     UA RESULTS:  Recent Labs   Lab Test  12/02/16   0822  12/04/12   1925   COLOR  Yellow  Yellow   APPEARANCE  Clear  Clear   URINEGLC  Negative  Negative   URINEBILI  Negative  Negative   URINEKETONE  Negative  Negative   SG  1.025  1.008   UBLD  Negative  Negative   URINEPH  5.0  6.0   PROTEIN  Negative  Negative   UROBILINOGEN  0.2   --    NITRITE  Negative  Negative   LEUKEST  Negative  Negative   RBCU   --   <1   WBCU   --   0     [place]

## 2017-12-22 NOTE — LETTER
12/22/2017       RE: Dylan Davila  [address] [facility] [address]     Dear Colleague,    Thank you for referring your patient, Dylan Davila, to the [facility] [place] [facility]. Please see a copy of my visit note below.    Dylan Davila is a 77-year-old male with a family history of prostate cancer and Parkinson's disease.  His PSA is 0.89  His current urinary symptoms are worsening urinary urgency and urge incontinence, no dysuria or hematuria    Other past medical history: Basal cell carcinoma, melanoma, BPH, lumbar disc disease, ED, osteopenia, hyperlipidemia, history of pneumonia, resting tremor, hearing loss, former smoker  Medications: Low-dose aspirin, Lipitor, Avodart, coenzyme Q10, metronidazole cream, ketoconazole cream, multivitamin, Viagra, Ambien  Allergies: Calcium, codeine, Flomax  Exam: Masked facies, alert and oriented, normal vital signs. Normal respirations. 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 Tolterodine ER 4 mg q.a.m.      Again, thank you for allowing me to participate in the care of your patient.      Sincerely,    Frandy Christiansen MD

## 2017-12-22 NOTE — MR AVS SNAPSHOT
After Visit Summary   12/22/2017    Dylan Davila    MRN: [id number]           Patient Information     Date Of Birth          1940        Visit Information        Provider Department      12/22/2017 8:20 AM Frandy Christiansen MD [facility] [place]        Today's Diagnoses     Benign prostatic hyperplasia without lower urinary tract symptoms    -  1    Urge incontinence of urine           Follow-ups after your visit        Follow-up notes from your care team     Return in about 1 year (around 12/22/2018) for PSA.      Your next 10 appointments already scheduled     Dec 28, 2017  9:30 AM CST   MILEY Extremity with Emma Ingram PT   [facility]    [address] [place] [id number] [phone]              Future tests that were ordered for you today     Open Future Orders        Priority Expected Expires Ordered    PSA Diag Urologic Phys Routine 12/22/2018 12/22/2018 12/22/2017            Who to contact     If you have questions or need follow up information about today's clinic visit or your schedule please contact [facility] [place] directly at [phone].  Normal or non-critical lab and imaging results will be communicated to you by MyChart, letter or phone within 4 business days after the clinic has received the results. If you do not hear from us within 7 days, please contact the clinic through [email or web address] or phone. If you have a critical or abnormal lab result, we will notify you by phone as soon as possible.  Submit refill requests through Finderly or call your pharmacy and they will forward the refill request to us. Please allow 3 business days for your refill to be completed.          Additional Information About Your Visit        MyChart Information     Finderly gives you secure access to your electronic health record. If you see  "a primary care provider, you can also send messages to your care team and make appointments. If you have questions, please call your primary care clinic.  If you do not have a primary care provider, please call [phone] and they will assist you.        Care EveryWhere ID     This is your Care EveryWhere ID. This could be used by other organizations to access your [place] medical records  [id number]        Your Vitals Were     Pulse Height BMI (Body Mass Index)             70 1.88 m (6' 2\") 25.68 kg/m2          Blood Pressure from Last 3 Encounters:   12/22/17 132/80   10/13/17 122/78   12/02/16 132/70    Weight from Last 3 Encounters:   12/22/17 90.7 kg (200 lb)   10/13/17 90.5 kg (199 lb 8 oz)   12/02/16 93 kg (205 lb)              We Performed the Following     PSA Diag Urologic Phys     UA without Microscopic          Today's Medication Changes          These changes are accurate as of: 12/22/17  8:34 AM.  If you have any questions, ask your nurse or doctor.               Start taking these medicines.        Dose/Directions    tolterodine 4 MG 24 hr capsule   Commonly known as:  DETROL LA   Used for:  Urge incontinence of urine   Started by:  Frandy Christiansen MD        Dose:  4 mg   Take 1 capsule (4 mg) by mouth daily   Quantity:  90 capsule   Refills:  3            Where to get your medicines      These medications were sent to [facility] [address] [place] [address]     Phone:  [phone]     tolterodine 4 MG 24 hr capsule                Primary Care Provider Office Phone # Fax #    Dereck Lomeli [phone]       [address] [facility] [address]        Equal Access to Services     ANDREA ROJAS : Yuan Garcia, delfino rosales, qawayne boogie. So [facility] [phone].    ATENCIÓN: Si habla español, tiene a young disposición servicios " remigio de asistencia lingüística. Elsi salgado [phone].    We comply with applicable federal civil rights laws and [place] laws. We do not discriminate on the basis of race, color, national origin, age, disability, sex, sexual orientation, or gender identity.            Thank you!     Thank you for choosing [facility]  for your care. Our goal is always to provide you with excellent care. Hearing back from our patients is one way we can continue to improve our services. Please take a few minutes to complete the written survey that you may receive in the mail after your visit with us. Thank you!             Your Updated Medication List - Protect others around you: Learn how to safely use, store and throw away your medicines at www.disposemymeds.org.          This list is accurate as of: 12/22/17  8:34 AM.  Always use your most recent med list.                   Brand Name Dispense Instructions for use Diagnosis    aspirin 81 MG tablet     100    1 tab po QD (Once per day)        atorvastatin 10 MG tablet    LIPITOR    90 tablet    Take 1 tablet (10 mg) by mouth At Bedtime    Hyperlipidemia LDL goal <100, Abnormal cardiac CT angiography       * AVODART 0.5 MG capsule   Generic drug:  dutasteride     3 months    1 CAPSULE DAILY    Urinary sys symptom NEC       * dutasteride 0.5 MG capsule    AVODART    90 capsule    Take 1 capsule (0.5 mg) by mouth daily    BPH (benign prostatic hyperplasia)       co-enzyme Q-10 50 MG Caps      Take  by mouth.        ketoconazole 2 % cream    NIZORAL          metroNIDAZOLE 0.75 % cream    METROCREAM          MULTIVITAMIN TABS   OR      1 tab qd        * sildenafil 100 MG tablet    VIAGRA    7 tablet    Take  by mouth daily as needed for erectile dysfunction. 0.5 to 1    Impotence of organic origin       * sildenafil 50 MG tablet    VIAGRA     Take 50 mg by mouth        * sildenafil 100 MG tablet    VIAGRA    12 tablet    Take 1 tablet (100  mg) by mouth daily as needed 30 min to 4 hrs before sex. Do not use with nitroglycerin, terazosin or doxazosin.    Impotence of organic origin       tolterodine 4 MG 24 hr capsule    DETROL LA    90 capsule    Take 1 capsule (4 mg) by mouth daily    Urge incontinence of urine       zolpidem 6.25 MG CR tablet    AMBIEN CR    30 tablet    Take 1 tablet (6.25 mg) by mouth nightly as needed for sleep    Sedative, hypnotic or anxiolytic dependence (H), Chronic insomnia       * Notice:  This list has 5 medication(s) that are the same as other medications prescribed for you. Read the directions carefully, and ask your doctor or other care provider to review them with you.

## 2017-12-26 LAB
ALBUMIN UR-MCNC: NEGATIVE MG/DL
APPEARANCE UR: CLEAR
BILIRUB UR QL STRIP: NEGATIVE
COLOR UR AUTO: YELLOW
GLUCOSE UR STRIP-MCNC: NEGATIVE MG/DL
HGB UR QL STRIP: ABNORMAL
KETONES UR STRIP-MCNC: ABNORMAL MG/DL
LEUKOCYTE ESTERASE UR QL STRIP: NEGATIVE
NITRATE UR QL: NEGATIVE
PH UR STRIP: 5 PH (ref 5–7)
SOURCE: ABNORMAL
SP GR UR STRIP: 1.02 (ref 1–1.03)
UROBILINOGEN UR STRIP-ACNC: 0.2 EU/DL (ref 0.2–1)

## 2017-12-26 NOTE — TELEPHONE ENCOUNTER
Patient called and LM on nurse line. Returned patient's phone call and spoke with patient. He states that his Detrol RX is too expensive. He would like for us to call in a Rx for Tolterodine 2mg to his [facility]. Will forward to MD for approval and then notify patient's of Md's response.    Alessandra Singleton LPN

## 2017-12-27 NOTE — TELEPHONE ENCOUNTER
Pt wanted the 2mg tolterodine not the 4 mg.  WMK said that is fine to take 2mg 2 tabs BID.  I spoke with pt to take 4 pills a day or just the one 4mg tab like [facility] prescribed and sent to [facility] [place].  Pt will  the 4mg tolterodine and take that once a day.  XANDER Mena, CMA

## 2017-12-28 ENCOUNTER — THERAPY VISIT (OUTPATIENT)
Dept: PHYSICAL THERAPY | Facility: CLINIC | Age: 77
End: 2017-12-28
Payer: MEDICARE

## 2017-12-28 DIAGNOSIS — M25.512 ACUTE PAIN OF LEFT SHOULDER: ICD-10-CM

## 2017-12-28 PROCEDURE — 97112 NEUROMUSCULAR REEDUCATION: CPT | Mod: GP | Performed by: PHYSICAL THERAPIST

## 2017-12-28 PROCEDURE — 97110 THERAPEUTIC EXERCISES: CPT | Mod: GP | Performed by: PHYSICAL THERAPIST

## 2018-01-18 ENCOUNTER — THERAPY VISIT (OUTPATIENT)
Dept: PHYSICAL THERAPY | Facility: CLINIC | Age: 78
End: 2018-01-18
Payer: MEDICARE

## 2018-01-18 DIAGNOSIS — M25.512 ACUTE PAIN OF LEFT SHOULDER: ICD-10-CM

## 2018-01-18 PROCEDURE — 97530 THERAPEUTIC ACTIVITIES: CPT | Mod: GP | Performed by: PHYSICAL THERAPIST

## 2018-01-18 PROCEDURE — G8981 BODY POS CURRENT STATUS: HCPCS | Mod: GP | Performed by: PHYSICAL THERAPIST

## 2018-01-18 PROCEDURE — G8982 BODY POS GOAL STATUS: HCPCS | Mod: GP | Performed by: PHYSICAL THERAPIST

## 2018-01-18 PROCEDURE — 97110 THERAPEUTIC EXERCISES: CPT | Mod: GP | Performed by: PHYSICAL THERAPIST

## 2018-01-18 NOTE — LETTER
DEPARTMENT OF HEALTH AND HUMAN SERVICES  CENTERS FOR MEDICARE & MEDICAID SERVICES    PLAN/UPDATED PLAN OF PROGRESS FOR OUTPATIENT REHABILITATION    PATIENTS NAME:  Dylan Davila   : 1940  PROVIDER NUMBER:    [id number]  [id number]  PROVIDER NAME: [place] [facility]  MEDICAL RECORD NUMBER: [id number]   START OF CARE DATE:  SOC Date: 10/17/17   TYPE:  PT  PRIMARY/TREATMENT DIAGNOSIS: (Pertinent Medical Diagnosis)  Acute pain of left shoulder    VISITS FROM START OF CARE:  Rxs Used: 6     PROGRESS  REPORT  Progress reporting period is from 10-27-17 to 18.       SUBJECTIVE  Subjective changes noted by patient:  Pt reported he is able to lie on the left shoulder without discomfort and for unlimited time. Pt is able to move the L arm overhead/out to the side with less pain.    Current pain level is 0-2/10.  Previous pain level was 4/10.   Changes in function:  Yes (See Goal flowsheet attached for changes in current functional level)  Adverse reaction to treatment or activity: None    OBJECTIVE  Objective: AROM: flexion-130 degrees; 135 degrees active-assist. ER-55 degrees (active-assist). PROM: end range pain/loss of movement with flexion/scaption/ER.     ASSESSMENT/PLAN  Updated problem list and treatment plan: Diagnosis 1:  Left shoulder pain  Pain -  self management, education and home program  Decreased ROM/flexibility - therapeutic exercise  Decreased function - therapeutic activities  STG/LTGs have been met or progress has been made towards goals:  Yes (See Goal flow sheet completed today.)  Assessment of Progress: Patient is meeting short term goals and is progressing towards long term goals.  Self Management Plans:  Patient has been instructed in a home treatment program.  I have re-evaluated this patient and find that the nature, scope, duration and intensity of the therapy is appropriate for the medical condition of the patient.  Dylan  "continues to require the following intervention to meet STG and LTG's:  One additional visit planned for follow-up and then discharge.    Recommendations:  See above.        Caregiver Signature/Credentials _____________________________ Date ________       Treating Provider: Emma Ingram PT   I have reviewed and certified the need for these services and plan of treatment while under my care.        PHYSICIAN'S SIGNATURE:   ___________________________________  Date___________                        Dereck Lomeli MD    Certification period:  Beginning of Cert date period: 10/17/17 to  End of Cert period date: 03/13/18     Functional Level Progress Report: Please see attached \"Goal Flow sheet for Functional level.\"    ____X____ Continue Services or       ________ DC Services                Service dates: From  SOC Date: 10/17/17 date to present                         "

## 2018-01-18 NOTE — MR AVS SNAPSHOT
After Visit Summary   1/18/2018    Dylan Dvaila    MRN: [id number]           Patient Information     Date Of Birth          1940        Visit Information        Provider Department      1/18/2018 9:30 AM Emma Ingram PT [facility]        Today's Diagnoses     Acute pain of left shoulder           Follow-ups after your visit        Your next 10 appointments already scheduled     Feb 01, 2018  9:30 AM CST   MILEY Extremity with Emma Ingram PT   [facility] (MILEY [place]  )    [address] [id number] [phone]              Who to contact     If you have questions or need follow up information about today's clinic visit or your schedule please contact [facility]ERAP[facility] directly at [phone].  Normal or non-critical lab and imaging results will be communicated to you by MyChart, letter or phone within 4 business days after the clinic has received the results. If you do not hear from us within 7 days, please contact the clinic through [employer] Medical[employer] or phone. If you have a critical or abnormal lab result, we will notify you by phone as soon as possible.  Submit refill requests through [employer] or call your pharmacy and they will forward the refill request to us. Please allow 3 business days for your refill to be completed.          Additional Information About Your Visit        MyChart Information     [employer] gives you secure access to your electronic health record. If you see a primary care provider, you can also send messages to your care team and make appointments. If you have questions, please call your primary care clinic.  If you do not have a primary care provider, please call [phone] and they will assist you.        Care EveryWhere ID     This is your Care EveryWhere ID. This could be used by other  organizations to access your [place] medical records  [id number]         Blood Pressure from Last 3 Encounters:   12/22/17 132/80   10/13/17 122/78   12/02/16 132/70    Weight from Last 3 Encounters:   12/22/17 90.7 kg (200 lb)   10/13/17 90.5 kg (199 lb 8 oz)   12/02/16 93 kg (205 lb)              We Performed the Following     MILEY RE-CERT REPORT     Therapeutic Activities     Therapeutic Exercises        Primary Care Provider Office Phone # Fax #    Dereck Lomeli [phone]       [address] [facility] [address]        Equal Access to Services     TREVA ROJAS : Hadii lynda Garcia, waaxda luqadaha, qaybta kaalmada adeaideyachristi, wayne potter . So [facility] [phone].    ATENCIÓN: Si habla español, tiene a young disposición servicios gratuitos de asistencia lingüística. Llame al [phone].    We comply with applicable federal civil rights laws and [place] laws. We do not discriminate on the basis of race, color, national origin, age, disability, sex, sexual orientation, or gender identity.            Thank you!     Thank you for choosing [facility]  for your care. Our goal is always to provide you with excellent care. Hearing back from our patients is one way we can continue to improve our services. Please take a few minutes to complete the written survey that you may receive in the mail after your visit with us. Thank you!             Your Updated Medication List - Protect others around you: Learn how to safely use, store and throw away your medicines at www.disposemymeds.org.          This list is accurate as of: 1/18/18 10:38 AM.  Always use your most recent med list.                   Brand Name Dispense Instructions for use Diagnosis    aspirin 81 MG tablet     100    1 tab po QD (Once per day)        atorvastatin 10 MG tablet    LIPITOR    90 tablet    Take 1 tablet (10 mg) by mouth At  Bedtime    Hyperlipidemia LDL goal <100, Abnormal cardiac CT angiography       * AVODART 0.5 MG capsule   Generic drug:  dutasteride     3 months    1 CAPSULE DAILY    Urinary sys symptom NEC       * dutasteride 0.5 MG capsule    AVODART    90 capsule    Take 1 capsule (0.5 mg) by mouth daily    BPH (benign prostatic hyperplasia)       co-enzyme Q-10 50 MG Caps      Take  by mouth.        ketoconazole 2 % cream    NIZORAL          metroNIDAZOLE 0.75 % cream    METROCREAM          MULTIVITAMIN TABS   OR      1 tab qd        * sildenafil 100 MG tablet    VIAGRA    7 tablet    Take  by mouth daily as needed for erectile dysfunction. 0.5 to 1    Impotence of organic origin       * sildenafil 50 MG tablet    VIAGRA     Take 50 mg by mouth        * sildenafil 100 MG tablet    VIAGRA    12 tablet    Take 1 tablet (100 mg) by mouth daily as needed 30 min to 4 hrs before sex. Do not use with nitroglycerin, terazosin or doxazosin.    Impotence of organic origin       tolterodine 4 MG 24 hr capsule    DETROL LA    90 capsule    Take 1 capsule (4 mg) by mouth daily    Urge incontinence of urine       zolpidem 6.25 MG CR tablet    AMBIEN CR    30 tablet    Take 1 tablet (6.25 mg) by mouth nightly as needed for sleep    Sedative, hypnotic or anxiolytic dependence (H), Chronic insomnia       * Notice:  This list has 5 medication(s) that are the same as other medications prescribed for you. Read the directions carefully, and ask your doctor or other care provider to review them with you.

## 2018-01-18 NOTE — PROGRESS NOTES
Subjective:  HPI                    Objective:  System    Physical Exam    General     ROS    Assessment/Plan:    PROGRESS  REPORT    Progress reporting period is from 10-27-17 to 1-18-18.       SUBJECTIVE  Subjective changes noted by patient:  Pt reported he is able to lie on the left shoulder without discomfort and for unlimited time. Pt is able to move the L arm overhead/out to the side with less pain.    Current pain level is 0-2/10.  Previous pain level was 4/10.   Changes in function:  Yes (See Goal flowsheet attached for changes in current functional level)  Adverse reaction to treatment or activity: None    OBJECTIVE  Objective: AROM: flexion-130 degrees; 135 degrees active-assist. ER-55 degrees (active-assist). PROM: end range pain/loss of movement with flexion/scaption/ER.     ASSESSMENT/PLAN  Updated problem list and treatment plan: Diagnosis 1:  Left shoulder pain  Pain -  self management, education and home program  Decreased ROM/flexibility - therapeutic exercise  Decreased function - therapeutic activities  STG/LTGs have been met or progress has been made towards goals:  Yes (See Goal flow sheet completed today.)  Assessment of Progress: Patient is meeting short term goals and is progressing towards long term goals.  Self Management Plans:  Patient has been instructed in a home treatment program.  I have re-evaluated this patient and find that the nature, scope, duration and intensity of the therapy is appropriate for the medical condition of the patient.  Dylan continues to require the following intervention to meet STG and LTG's:  1x week, once daily. Duration: 2 weeks.    Recommendations:  See above.    Please refer to the daily flowsheet for treatment today, total treatment time and time spent performing 1:1 timed codes.

## 2018-01-18 NOTE — LETTER
DEPARTMENT OF HEALTH AND HUMAN SERVICES  CENTERS FOR MEDICARE & MEDICAID SERVICES    PLAN/UPDATED PLAN OF PROGRESS FOR OUTPATIENT REHABILITATION    PATIENTS NAME:  Dylan Davila   : 1940  [facility] NUMBER:    [id number]  [id number]  [facility]: [place] [facility]ER[facility]  MEDICAL RECORD NUMBER: [id number]   START OF CARE DATE:  SOC Date: 10/17/17   TYPE:  PT    PRIMARY/TREATMENT DIAGNOSIS: (Pertinent Medical Diagnosis)  Acute pain of left shoulder    VISITS FROM START OF CARE:  Rxs Used: 6     PROGRESS  REPORT  Progress reporting period is from 10-27-17 to 18.       SUBJECTIVE  Subjective changes noted by patient:  Pt reported he is able to lie on the left shoulder without discomfort and for unlimited time. Pt is able to move the L arm overhead/out to the side with less pain.    Current pain level is 0-2/10.  Previous pain level was 4/10.   Changes in function:  Yes (See Goal flowsheet attached for changes in current functional level)  Adverse reaction to treatment or activity: None    OBJECTIVE  Objective: AROM: flexion-130 degrees; 135 degrees active-assist. ER-55 degrees (active-assist). PROM: end range pain/loss of movement with flexion/scaption/ER.     ASSESSMENT/PLAN  Updated problem list and treatment plan: Diagnosis 1:  Left shoulder pain  Pain -  self management, education and home program  Decreased ROM/flexibility - therapeutic exercise  Decreased function - therapeutic activities  STG/LTGs have been met or progress has been made towards goals:  Yes (See Goal flow sheet completed today.)  Assessment of Progress: Patient is meeting short term goals and is progressing towards long term goals.  Self Management Plans:  Patient has been instructed in a home treatment program.  I have re-evaluated this patient and find that the nature, scope, duration and intensity of the therapy is appropriate for the medical condition of the patient.  Dylan  "continues to require the following intervention to meet STG and LTG's:  1x week, once daily. Duration: 2 weeks.          Recommendations:  See above.    Caregiver Signature/Credentials _____________________________ Date ________       Treating Provider: Emma Ingram PT   I have reviewed and certified the need for these services and plan of treatment while under my care.        PHYSICIAN'S SIGNATURE:   ___________________________________  Date___________                       Dereck Lomeli MD    Certification period:  Beginning of Cert date period: 01/15/18 to  End of Cert period date: 03/13/18     Functional Level Progress Report: Please see attached \"Goal Flow sheet for Functional level.\"    ____X____ Continue Services or       ________ DC Services                Service dates: From  SOC Date: 10/17/17 date to present                         "

## 2018-02-02 ENCOUNTER — THERAPY VISIT (OUTPATIENT)
Dept: PHYSICAL THERAPY | Facility: CLINIC | Age: 78
End: 2018-02-02
Payer: MEDICARE

## 2018-02-02 DIAGNOSIS — M25.512 ACUTE PAIN OF LEFT SHOULDER: ICD-10-CM

## 2018-02-02 PROCEDURE — 97530 THERAPEUTIC ACTIVITIES: CPT | Mod: GP | Performed by: PHYSICAL THERAPIST

## 2018-02-02 PROCEDURE — G8982 BODY POS GOAL STATUS: HCPCS | Mod: GP | Performed by: PHYSICAL THERAPIST

## 2018-02-02 PROCEDURE — G8983 BODY POS D/C STATUS: HCPCS | Mod: GP | Performed by: PHYSICAL THERAPIST

## 2018-02-02 PROCEDURE — 97110 THERAPEUTIC EXERCISES: CPT | Mod: GP | Performed by: PHYSICAL THERAPIST

## 2018-02-02 NOTE — MR AVS SNAPSHOT
After Visit Summary   2/2/2018    Dylan Davila    MRN: [id number]           Patient Information     Date Of Birth          1940        Visit Information        Provider Department      2/2/2018 1:20 PM Emma Ingram PT [facility] Physical Therapy        Today's Diagnoses     Acute pain of left shoulder           Follow-ups after your visit        Who to contact     If you have questions or need follow up information about today's clinic visit or your schedule please contact [facility] PHYSICAL THERAPY directly at [phone].  Normal or non-critical lab and imaging results will be communicated to you by Personal MedSystemshart, letter or phone within 4 business days after the clinic has received the results. If you do not hear from us within 7 days, please contact the clinic through GoCoopt or phone. If you have a critical or abnormal lab result, we will notify you by phone as soon as possible.  Submit refill requests through [employer] or call your pharmacy and they will forward the refill request to us. Please allow 3 business days for your refill to be completed.          Additional Information About Your Visit        MyChart Information     [employer] gives you secure access to your electronic health record. If you see a primary care provider, you can also send messages to your care team and make appointments. If you have questions, please call your primary care clinic.  If you do not have a primary care provider, please call [phone] and they will assist you.        Care EveryWhere ID     This is your Care EveryWhere ID. This could be used by other organizations to access your [place] medical records  [id number]         Blood Pressure from Last 3 Encounters:   12/22/17 132/80   10/13/17 122/78   12/02/16 132/70    Weight from Last 3 Encounters:   12/22/17 90.7 kg (200 lb)   10/13/17 90.5 kg (199 lb 8 oz)   12/02/16 93 kg (205  park)              We Performed the Following     Therapeutic Activities     Therapeutic Exercises        Primary Care Provider Office Phone # Fax #    Dereck Lomeli [phone]       [address] [facility] [address]        Equal Access to Services     RORYTREVA BOB : Hadii lynda ku haddarlyno Soomaali, waaxda luqadaha, qaybta kaalmada adeegyada, wayne carpentern brittaide martel tariq . So [facility] [phone].    ATENCIÓN: Si habla español, tiene a young disposición servicios gratuitos de asistencia lingüística. Llame al [phone].    We comply with applicable federal civil rights laws and [place] laws. We do not discriminate on the basis of race, color, national origin, age, disability, sex, sexual orientation, or gender identity.            Thank you!     Thank you for choosing [facility] PHYSICAL THERAPY  for your care. Our goal is always to provide you with excellent care. Hearing back from our patients is one way we can continue to improve our services. Please take a few minutes to complete the written survey that you may receive in the mail after your visit with us. Thank you!             Your Updated Medication List - Protect others around you: Learn how to safely use, store and throw away your medicines at www.disposemymeds.org.          This list is accurate as of 2/2/18  6:02 PM.  Always use your most recent med list.                   Brand Name Dispense Instructions for use Diagnosis    aspirin 81 MG tablet     100    1 tab po QD (Once per day)        atorvastatin 10 MG tablet    LIPITOR    90 tablet    Take 1 tablet (10 mg) by mouth At Bedtime    Hyperlipidemia LDL goal <100, Abnormal cardiac CT angiography       * AVODART 0.5 MG capsule   Generic drug:  dutasteride     3 months    1 CAPSULE DAILY    Urinary sys symptom NEC       * dutasteride 0.5 MG capsule    AVODART    90 capsule    Take 1 capsule (0.5 mg) by mouth daily    BPH (benign  prostatic hyperplasia)       co-enzyme Q-10 50 MG Caps      Take  by mouth.        ketoconazole 2 % cream    NIZORAL          metroNIDAZOLE 0.75 % cream    METROCREAM          MULTIVITAMIN TABS   OR      1 tab qd        * sildenafil 100 MG tablet    VIAGRA    7 tablet    Take  by mouth daily as needed for erectile dysfunction. 0.5 to 1    Impotence of organic origin       * sildenafil 50 MG tablet    VIAGRA     Take 50 mg by mouth        * sildenafil 100 MG tablet    VIAGRA    12 tablet    Take 1 tablet (100 mg) by mouth daily as needed 30 min to 4 hrs before sex. Do not use with nitroglycerin, terazosin or doxazosin.    Impotence of organic origin       tolterodine 4 MG 24 hr capsule    DETROL LA    90 capsule    Take 1 capsule (4 mg) by mouth daily    Urge incontinence of urine       zolpidem 6.25 MG CR tablet    AMBIEN CR    30 tablet    Take 1 tablet (6.25 mg) by mouth nightly as needed for sleep    Sedative, hypnotic or anxiolytic dependence (H), Chronic insomnia       * Notice:  This list has 5 medication(s) that are the same as other medications prescribed for you. Read the directions carefully, and ask your doctor or other care provider to review them with you.

## 2018-02-06 ENCOUNTER — TELEPHONE (OUTPATIENT)
Dept: UROLOGY | Facility: CLINIC | Age: 78
End: 2018-02-06

## 2018-02-06 NOTE — TELEPHONE ENCOUNTER
Patient called nurse line and LM. Returned patient's phone call and spoke with patient. He reports that he has some swelling in his (L) testicle. He was wondering if he should make an appt. and this nurse transferred patient to  to schedule one.    Alessandra Singleton LPN

## 2018-02-13 DIAGNOSIS — N50.89 SWELLING OF LEFT TESTICLE: Primary | ICD-10-CM

## 2018-02-14 ENCOUNTER — OFFICE VISIT (OUTPATIENT)
Dept: UROLOGY | Facility: CLINIC | Age: 78
End: 2018-02-14
Payer: COMMERCIAL

## 2018-02-14 VITALS
DIASTOLIC BLOOD PRESSURE: 90 MMHG | HEART RATE: 80 BPM | WEIGHT: 199 LBS | SYSTOLIC BLOOD PRESSURE: 150 MMHG | HEIGHT: 74 IN | BODY MASS INDEX: 25.54 KG/M2

## 2018-02-14 DIAGNOSIS — N50.89 SWELLING OF LEFT TESTICLE: ICD-10-CM

## 2018-02-14 LAB
ALBUMIN UR-MCNC: NEGATIVE MG/DL
APPEARANCE UR: CLEAR
BILIRUB UR QL STRIP: NEGATIVE
COLOR UR AUTO: YELLOW
GLUCOSE UR STRIP-MCNC: NEGATIVE MG/DL
HGB UR QL STRIP: NEGATIVE
KETONES UR STRIP-MCNC: NEGATIVE MG/DL
LEUKOCYTE ESTERASE UR QL STRIP: NEGATIVE
NITRATE UR QL: NEGATIVE
PH UR STRIP: 7 PH (ref 5–7)
SOURCE: NORMAL
SP GR UR STRIP: 1.01 (ref 1–1.03)
UROBILINOGEN UR STRIP-ACNC: 1 EU/DL (ref 0.2–1)

## 2018-02-14 PROCEDURE — 81003 URINALYSIS AUTO W/O SCOPE: CPT | Performed by: UROLOGY

## 2018-02-14 PROCEDURE — 99212 OFFICE O/P EST SF 10 MIN: CPT | Performed by: UROLOGY

## 2018-02-14 ASSESSMENT — PAIN SCALES - GENERAL: PAINLEVEL: NO PAIN (0)

## 2018-02-14 NOTE — NURSING NOTE
Chief Complaint   Patient presents with     Swelling     Patient has some swelling in left testicle, without pain     Jhony Cerda LPN 9:42 AM February 14, 2018

## 2018-02-14 NOTE — MR AVS SNAPSHOT
"              After Visit Summary   2/14/2018    Dylan Davila    MRN: [id number]           Patient Information     Date Of Birth          1940        Visit Information        Provider Department      2/14/2018 9:30 AM Frandy Christiansen MD [facility]        Today's Diagnoses     Swelling of left testicle           Follow-ups after your visit        Who to contact     If you have questions or need follow up information about today's clinic visit or your schedule please contact [facility]OLOGY CLINIC [facility] directly at [phone].  Normal or non-critical lab and imaging results will be communicated to you by Vanilla Forumshart, letter or phone within 4 business days after the clinic has received the results. If you do not hear from us within 7 days, please contact the clinic through AOLt or phone. If you have a critical or abnormal lab result, we will notify you by phone as soon as possible.  Submit refill requests through [employer] or call your pharmacy and they will forward the refill request to us. Please allow 3 business days for your refill to be completed.          Additional Information About Your Visit        MyChart Information     [employer] gives you secure access to your electronic health record. If you see a primary care provider, you can also send messages to your care team and make appointments. If you have questions, please call your primary care clinic.  If you do not have a primary care provider, please call [phone] and they will assist you.        Care EveryWhere ID     This is your Care EveryWhere ID. This could be used by other organizations to access your [place] medical records  [id number]        Your Vitals Were     Pulse Height BMI (Body Mass Index)             80 1.88 m (6' 2\") 25.55 kg/m2          Blood Pressure from Last 3 Encounters:   02/14/18 150/90   12/22/17 132/80   10/13/17 122/78    Weight from Last 3 " Encounters:   02/14/18 90.3 kg (199 lb)   12/22/17 90.7 kg (200 lb)   10/13/17 90.5 kg (199 lb 8 oz)              We Performed the Following     UA without Microscopic        Primary Care Provider Office Phone # Fax #    Dereck Lomeli [phone]       [address] [facility] [address]        Equal Access to Services     ANDREA ROJAS : Hadii aad ku hadasho Soomaali, waaxda luqadaha, qaybta kaalmada adeegyada, waxay idiin hayaan adeeg kharash lalashell . So [facility] [phone].    ATENCIÓN: Si habla español, tiene a young disposición servicios gratuitos de asistencia lingüística. Llame al [phone].    We comply with applicable federal civil rights laws and [place] laws. We do not discriminate on the basis of race, color, national origin, age, disability, sex, sexual orientation, or gender identity.            Thank you!     Thank you for choosing [facility] UROLOGY CLINIC [place]  for your care. Our goal is always to provide you with excellent care. Hearing back from our patients is one way we can continue to improve our services. Please take a few minutes to complete the written survey that you may receive in the mail after your visit with us. Thank you!             Your Updated Medication List - Protect others around you: Learn how to safely use, store and throw away your medicines at www.disposemymeds.org.          This list is accurate as of 2/14/18  9:50 AM.  Always use your most recent med list.                   Brand Name Dispense Instructions for use Diagnosis    aspirin 81 MG tablet     100    1 tab po QD (Once per day)        atorvastatin 10 MG tablet    LIPITOR    90 tablet    Take 1 tablet (10 mg) by mouth At Bedtime    Hyperlipidemia LDL goal <100, Abnormal cardiac CT angiography       * AVODART 0.5 MG capsule   Generic drug:  dutasteride     3 months    1 CAPSULE DAILY    Urinary sys symptom NEC       * dutasteride 0.5 MG capsule    AVODART    90 capsule     Take 1 capsule (0.5 mg) by mouth daily    BPH (benign prostatic hyperplasia)       co-enzyme Q-10 50 MG Caps      Take  by mouth.        ketoconazole 2 % cream    NIZORAL          metroNIDAZOLE 0.75 % cream    METROCREAM          MULTIVITAMIN TABS   OR      1 tab qd        sildenafil 100 MG tablet    VIAGRA    7 tablet    Take  by mouth daily as needed for erectile dysfunction. 0.5 to 1    Impotence of organic origin       tolterodine 4 MG 24 hr capsule    DETROL LA    90 capsule    Take 1 capsule (4 mg) by mouth daily    Urge incontinence of urine       zolpidem 6.25 MG CR tablet    AMBIEN CR    30 tablet    Take 1 tablet (6.25 mg) by mouth nightly as needed for sleep    Sedative, hypnotic or anxiolytic dependence (H), Chronic insomnia       * Notice:  This list has 2 medication(s) that are the same as other medications prescribed for you. Read the directions carefully, and ask your doctor or other care provider to review them with you.

## 2018-02-14 NOTE — LETTER
2/14/2018       RE: Dylan Davila  [address] [facility] [address]     Dear Colleague,    Thank you for referring your patient, Dylan Davila, to the [facility]OLOG[facility] [place] at [facility]. Please see a copy of my visit note below.    Dylan Davila is a 77-year-old male with Parkinson's disease who I saw in December. He is scheduled to see me yearly. For the past 3 months he has noticed some enlargement of the right testicle that is not painful.  On exam: He has a right spermatocele that transilluminates. Both testicles are descended and within normal limits. Spermatic cords are normal.  Other past medical history previously reviewed  Medications: No change  Allergies: No change  Assessment: Right spermatocele  Plan: No need for ultrasound or surgery unless it grows larger becomes uncomfortable. See me in December as scheduled    Again, thank you for allowing me to participate in the care of your patient.      Sincerely,    Frandy Christiansen MD

## 2018-03-14 DIAGNOSIS — F51.04 CHRONIC INSOMNIA: ICD-10-CM

## 2018-03-14 DIAGNOSIS — F13.20 SEDATIVE, HYPNOTIC OR ANXIOLYTIC DEPENDENCE (H): ICD-10-CM

## 2018-03-14 DIAGNOSIS — G47.00 PERSISTENT INSOMNIA: ICD-10-CM

## 2018-03-14 NOTE — TELEPHONE ENCOUNTER
Last Written Prescription Date:  10/13/17  Last Fill Quantity: 30,  # refills: 5   Last office visit: 10/13/2017 with prescribing provider:     Future Office Visit:      Routing refill request to provider for review/approval because:  Drug not on the [facility] refill protocol

## 2018-03-16 RX ORDER — ZOLPIDEM TARTRATE 5 MG/1
TABLET ORAL
Qty: 30 TABLET | Refills: 1 | OUTPATIENT
Start: 2018-03-16

## 2018-03-16 RX ORDER — ZOLPIDEM TARTRATE 6.25 MG/1
6.25 TABLET, FILM COATED, EXTENDED RELEASE ORAL
Qty: 30 TABLET | Refills: 5 | Status: CANCELLED | OUTPATIENT
Start: 2018-03-16

## 2018-03-16 NOTE — TELEPHONE ENCOUNTER
Ambien       Last Written Prescription Date:  10/13/17  Last Fill Quantity: 30,   # refills: 5  Last Office Visit: 10/13/17  Future Office visit:       Routing refill request to provider for review/approval because:  Drug not on the [facility], P or [facility] refill protocol or controlled substance

## 2018-03-16 NOTE — TELEPHONE ENCOUNTER
pt changed to ambien CR. No longer on plain ambien. Need clarification on what is being requested here?

## 2018-03-20 RX ORDER — ZOLPIDEM TARTRATE 5 MG/1
TABLET ORAL
Qty: 30 TABLET | Refills: 5 | Status: SHIPPED | OUTPATIENT
Start: 2018-03-20 | End: 2018-10-16

## 2018-03-20 NOTE — TELEPHONE ENCOUNTER
Dr. Lomeli,     I spoke with Ghulam (Dylan) and he stated that he wanted to go back on the regular Ambien instead of the Time release.  His insurance does not pay for the regular Ambien but he is going to use cash to pay for it.     Please advise.     Kathy.MARTHA Turner ([facility])'

## 2018-05-01 PROBLEM — M25.512 ACUTE PAIN OF LEFT SHOULDER: Status: RESOLVED | Noted: 2017-10-17 | Resolved: 2018-05-01

## 2018-05-18 DIAGNOSIS — F51.04 CHRONIC INSOMNIA: Primary | ICD-10-CM

## 2018-05-22 DIAGNOSIS — G47.00 PERSISTENT INSOMNIA: ICD-10-CM

## 2018-05-22 DIAGNOSIS — F13.20 SEDATIVE, HYPNOTIC OR ANXIOLYTIC DEPENDENCE (H): ICD-10-CM

## 2018-05-22 RX ORDER — ZOLPIDEM TARTRATE 6.25 MG/1
TABLET, FILM COATED, EXTENDED RELEASE ORAL
Qty: 30 TABLET | Refills: 5 | Status: SHIPPED | OUTPATIENT
Start: 2018-05-22 | End: 2018-10-16

## 2018-06-23 ENCOUNTER — TRANSFERRED RECORDS (OUTPATIENT)
Dept: HEALTH INFORMATION MANAGEMENT | Facility: CLINIC | Age: 78
End: 2018-06-23

## 2018-07-03 ENCOUNTER — THERAPY VISIT (OUTPATIENT)
Dept: PHYSICAL THERAPY | Facility: CLINIC | Age: 78
End: 2018-07-03
Payer: MEDICARE

## 2018-07-03 DIAGNOSIS — M54.50 ACUTE LEFT-SIDED LOW BACK PAIN WITHOUT SCIATICA: Primary | ICD-10-CM

## 2018-07-03 PROCEDURE — 97161 PT EVAL LOW COMPLEX 20 MIN: CPT | Mod: GP | Performed by: PHYSICAL THERAPIST

## 2018-07-03 PROCEDURE — 97110 THERAPEUTIC EXERCISES: CPT | Mod: GP | Performed by: PHYSICAL THERAPIST

## 2018-07-03 PROCEDURE — G8982 BODY POS GOAL STATUS: HCPCS | Mod: GP | Performed by: PHYSICAL THERAPIST

## 2018-07-03 PROCEDURE — G8981 BODY POS CURRENT STATUS: HCPCS | Mod: GP | Performed by: PHYSICAL THERAPIST

## 2018-07-03 NOTE — PROGRESS NOTES
[facility] Initial Evaluation -- Lumbar    Date: July 3, 2018  Dylan Davila is a 77 year old male with a LB condition.   Referral: ortho  Work mechanical stresses:  retired  Employment status:  retired  Leisure mechanical stresses: exercises 1 hour, 3x/week  Functional disability score (SHOSHANA/STarT Back):  28%; 3/9--LOW  VAS score (0-10): 6/10  Patient goals/expectations:  To get the pain to decrease.    HISTORY:    Present symptoms: L LBP  Pain quality (sharp/shooting/stabbing/aching/burning/cramping):  aching   Paresthesia (yes/no):  no    Present since (onset date): 06/12/2018--lifting 25-30# boxes of books; worsened 06/30/2018 for unknown reasons--went to see MD that day.     Symptoms (improving/unchanging/worsening):  improving.     Symptoms commenced as a result of: lifting boxes of books   Condition occurred in the following environment:   home     Symptoms at onset (back/thigh/leg): L LB  Constant symptoms (back/thigh/leg): L LB  Intermittent symptoms (back/thigh/leg): none    Symptoms are made worse with the following: Always Bending, Always Rising, Always Standing 1 hour, Always Walking 1 hour, Time of day - No effect and Always On the move, losing 2 hours of sleep/night  Symptoms are made better with the following: Always When still    Disturbed sleep (yes/no):  Yes, losing 2 hours Sleeping postures (prone/sup/side R/L): supine, sides    Previous episodes (0/1-5/6-10/11+): 3 Year of first episode: 2000    Previous history: 3 episodes of LBP since 2000  Previous treatments: PT for last episode--helpful--extension exercises      Specific Questions:  Cough/Sneeze/Strain (pos/neg): neg  Bowel/Bladder (normal/abnormal): normal  Gait (normal/abnormal): not affected by LB--gait altered due to Parkinson's  Medications (nil/NSAIDS/analg/steroids/anticoag/other):  Other - Sleep  Medical allergies:  none  General health (excellent/good/fair/poor):  fair  Pertinent medical history:   Parkinson's disease  Imaging (None/Xray/MRI/Other):  None recent  Recent or major surgery (yes/no):  No--hx of hernia repair and colon surgery  Night pain (yes/no): no  Accidents (yes/no): no  Unexplained weight loss (yes/no): no  Barriers at home: no  Other red flags: no    EXAMINATION    Posture:   Sitting (good/fair/poor): poor  Standing (good/fair/poor):poor  Lordosis (red/acc/normal): red  Correction of posture (better/worse/no effect): worse    Lateral Shift (right/left/nil): nil  Relevant (yes/no):  na  Other Observations: na    Neurological:    Motor deficit:  Not assessed--no radicular complaints    Reflexes:  Not assessed--no radicular complaints    Sensory deficit:  Not assessed--no radicular complaints    Dural signs:  Not assessed--no radicular complaints      Movement Loss:   Bird Mod Min Nil Pain   Flexion x    Increases L LBP   Extension x    Increases L LBP   Side Gliding R x    Increases L LBP   Side Gliding L x    NE     Test Movements:   During: produces, abolishes, increases, decreases, no effect, centralizing, peripheralizing   After: better, worse, no better, no worse, no effect, centralized, peripheralized    Pretest symptoms standing:    Symptoms During Symptoms After ROM increased ROM decreased No Effect   FIS        Rep FIS        EIS        Rep EIS        Pretest symptoms lying: prone lying 1/10    Symptoms During Symptoms After ROM increased ROM decreased No Effect   TIFF        Rep TIFF        EIL increases No Worse      Rep EIL Increases Worse      If required, pretest symptoms:    Symptoms During Symptoms After ROM increased ROM decreased No Effect   SGIS - R        Rep SGIS - R        SGIS - L        Rep SGIS - L          Static Tests:  Sitting slouched:    Sitting erect:    Standing slouched   Standing erect:    Lying prone in extension:  Increases L lBP, NW Long sitting:      Other Tests:     Provisional Classification:  Derangement - Asymmetrical, unilateral, symptoms above  knee    Principle of Management:  Education:  Posture--use of lumbar roll in sitting, importance of posture   Equipment provided:  none  Mechanical therapy (Y/N):  y   Extension principle:  Prone lying x3-5 min, 4-6x/day, progress to DIANNE as able  Lateral Principle:    Flexion principle:    Other:      ASSESSMENT/PLAN:    Patient is a 77 year old male with lumbar complaints.  Provisional classification of derangement with directional preference for extension.  He has significantly limited lumbar ROM in all directions.  He had decreased pain after prone lying and gentle extension mobs.  Progression will be slow due to significant stiffness, but he should do well with an extension progression in addition to posture and body mechanics training to improve mobility, decrease pain, and improve overall function.     Patient has the following significant findings with corresponding treatment plan.                Diagnosis 1:  L LBP  Pain -  self management, education, directional preference exercise and home program  Decreased ROM/flexibility - manual therapy, therapeutic exercise and home program  Decreased function - therapeutic activities and home program  Impaired posture - neuro re-education and home program    Therapy Evaluation Codes:   1) History comprised of:   Personal factors that impact the plan of care:      None.    Comorbidity factors that impact the plan of care are:      Parkinson's disease.     Medications impacting care: None.  2) Examination of Body Systems comprised of:   Body structures and functions that impact the plan of care:      Lumbar spine.   Activity limitations that impact the plan of care are:      Bending, Standing and rising from sitting, walking.  3) Clinical presentation characteristics are:   Stable/Uncomplicated.  4) Decision-Making    Low complexity using standardized patient assessment instrument and/or measureable assessment of functional outcome.  Cumulative Therapy Evaluation is:  Low complexity.    Previous and current functional limitations:  (See Goal Flow Sheet for this information)    Short term and Long term goals: (See Goal Flow Sheet for this information)     Communication ability:  Patient appears to be able to clearly communicate and understand verbal and written communication and follow directions correctly.  Treatment Explanation - The following has been discussed with the patient:   RX ordered/plan of care  Anticipated outcomes  Possible risks and side effects  This patient would benefit from PT intervention to resume normal activities.   Rehab potential is good.    Frequency:  1 X week, once daily  Duration:  for 6 weeks  Discharge Plan:  Achieve all LTG.  Independent in home treatment program.  Reach maximal therapeutic benefit.    Please refer to the daily flowsheet for treatment today, total treatment time and time spent performing 1:1 timed codes.     .

## 2018-07-03 NOTE — MR AVS SNAPSHOT
After Visit Summary   7/3/2018    Dylan Davila    MRN: [id number]           Patient Information     Date Of Birth          1940        Visit Information        Provider Department      7/3/2018 2:40 PM Sonia Santos PT [facility]        Today's Diagnoses     Acute left-sided low back pain without sciatica    -  1       Follow-ups after your visit        Your next 10 appointments already scheduled     Jul 18, 2018  9:50 AM CDT   MILEY Spine with Sonia Santos PT   [facility] ([facility]  )    [address] [place] [id number] [phone]              Who to contact     If you have questions or need follow up information about today's clinic visit or your schedule please contact [facility] PHYSICAL THERAPY directly at [phone].  Normal or non-critical lab and imaging results will be communicated to you by Chinac.comhart, letter or phone within 4 business days after the clinic has received the results. If you do not hear from us within 7 days, please contact the clinic through [email or web address] or phone. If you have a critical or abnormal lab result, we will notify you by phone as soon as possible.  Submit refill requests through fabrooms or call your pharmacy and they will forward the refill request to us. Please allow 3 business days for your refill to be completed.          Additional Information About Your Visit        MyChart Information     fabrooms gives you secure access to your electronic health record. If you see a primary care provider, you can also send messages to your care team and make appointments. If you have questions, please call your primary care clinic.  If you do not have a primary care provider, please call [phone] and they will assist you.        Care EveryWhere ID     This is your Care EveryWhere ID. This could be used  by other organizations to access your [place] medical records  [id number]         Blood Pressure from Last 3 Encounters:   02/14/18 150/90   12/22/17 132/80   10/13/17 122/78    Weight from Last 3 Encounters:   02/14/18 90.3 kg (199 lb)   12/22/17 90.7 kg (200 lb)   10/13/17 90.5 kg (199 lb 8 oz)              We Performed the Following     MILEY CERT REPORT     MILEY Inital Eval Report     PT Eval, Low Complexity (30490)     Therapeutic Exercises        Primary Care Provider Office Phone # Fax #    Dereck Lomeli [phone]       [address] [place] [phone]        Equal Access to Services     ANDREA ROJAS : Hadii aad ku hadasho Soleydiali, waaxda luqadaha, qaybta kaalmada adeegyada, waxay brittin izaiah hill. So [facility] [phone].    ATENCIÓN: Si habla español, tiene a young disposición servicios gratuitos de asistencia lingüística. [facility] [phone].    We comply with applicable federal civil rights laws and [place] laws. We do not discriminate on the basis of race, color, national origin, age, disability, sex, sexual orientation, or gender identity.            Thank you!     Thank you for choosing [facility] PH[facility]ICAL THERAP[facility]  for your care. Our goal is always to provide you with excellent care. Hearing back from our patients is one way we can continue to improve our services. Please take a few minutes to complete the written survey that you may receive in the mail after your visit with us. Thank you!             Your Updated Medication List - Protect others around you: Learn how to safely use, store and throw away your medicines at www.disposemymeds.org.          This list is accurate as of 7/3/18  5:25 PM.  Always use your most recent med list.                   Brand Name Dispense Instructions for use Diagnosis    aspirin 81 MG tablet     100    1 tab po QD (Once per day)        atorvastatin 10 MG tablet    LIPITOR    90 tablet     Take 1 tablet (10 mg) by mouth At Bedtime    Hyperlipidemia LDL goal <100, Abnormal cardiac CT angiography       * AVODART 0.5 MG capsule   Generic drug:  dutasteride     3 months    1 CAPSULE DAILY    Urinary sys symptom NEC       * dutasteride 0.5 MG capsule    AVODART    90 capsule    Take 1 capsule (0.5 mg) by mouth daily    BPH (benign prostatic hyperplasia)       co-enzyme Q-10 50 MG Caps      Take  by mouth.        ketoconazole 2 % cream    NIZORAL          metroNIDAZOLE 0.75 % cream    METROCREAM          MULTIVITAMIN TABS   OR      1 tab qd        sildenafil 100 MG tablet    VIAGRA    7 tablet    Take  by mouth daily as needed for erectile dysfunction. 0.5 to 1    Impotence of organic origin       tolterodine 4 MG 24 hr capsule    DETROL LA    90 capsule    Take 1 capsule (4 mg) by mouth daily    Urge incontinence of urine       * zolpidem 5 MG tablet    AMBIEN    30 tablet    TAKE ONE-HALF TO ONE TABLET BY MOUTH ONCE DAILY IN THE EVENING AS NEEDED FOR SLEEP    Persistent insomnia, Sedative, hypnotic or anxiolytic dependence (H)       * zolpidem 6.25 MG CR tablet    AMBIEN CR    30 tablet    TAKE ONE TABLET BY MOUTH ONCE DAILY IN THE EVENING AS NEEDED FOR SLEEP    Chronic insomnia       * Notice:  This list has 4 medication(s) that are the same as other medications prescribed for you. Read the directions carefully, and ask your doctor or other care provider to review them with you.

## 2018-07-03 NOTE — LETTER
DEPARTMENT OF HEALTH AND HUMAN SERVICES  CENTERS FOR MEDICARE & MEDICAID SERVICES    PLAN/UPDATED PLAN OF PROGRESS FOR OUTPATIENT REHABILITATION    PATIENTS NAME:  Dylan Davila   : 1940  PROVIDER NUMBER:    [id number]  [id number]  PROVIDER NAME: [place] [facility] PH[facility]AP[facility]  MEDICAL RECORD NUMBER: [id number]   START OF CARE DATE:  SOC Date: 18   TYPE:  PT  PRIMARY/TREATMENT DIAGNOSIS: (Pertinent Medical Diagnosis)  Acute left-sided low back pain without sciatica    VISITS FROM START OF CARE:  Rxs Used: 1     [place] [facility] Initial Evaluation -- Lumbar  Date: July 3, 2018  Dylan Davila is a 77 year old male with a LB condition.   Referral: ortho  Work mechanical stresses:  retired  Employment status:  retired  Leisure mechanical stresses: exercises 1 hour, 3x/week  Functional disability score (SHOSHANA/STarT Back):  28%; 3/9--LOW  VAS score (0-10): 610  Patient goals/expectations:  To get the pain to decrease.    HISTORY:  Present symptoms: L LBP  Pain quality (sharp/shooting/stabbing/aching/burning/cramping):  aching   Paresthesia (yes/no):  no  Present since (onset date): 2018--lifting 25-30# boxes of books; worsened 2018 for unknown reasons--went to see MD that day.     Symptoms (improving/unchanging/worsening):  improving.   Symptoms commenced as a result of: lifting boxes of books   Condition occurred in the following environment:   home   Symptoms at onset (back/thigh/leg): L LB  Constant symptoms (back/thigh/leg): L LB  Intermittent symptoms (back/thigh/leg): none  Symptoms are made worse with the following: Always Bending, Always Rising, Always Standing 1 hour, Always Walking 1 hour, Time of day - No effect and Always On the move, losing 2 hours of sleep/night  Symptoms are made better with the following: Always When still  Disturbed sleep (yes/no):  Yes, losing 2 hours Sleeping postures (prone/sup/side R/L): supine,  sides  Previous episodes (0/1-5/6-10/11+): 3 Year of first episode: 2000  Previous history: 3 episodes of LBP since 2000  Previous treatments: PT for last episode--helpful--extension exercises    Specific Questions:  Cough/Sneeze/Strain (pos/neg): neg  Bowel/Bladder (normal/abnormal): normal  Gait (normal/abnormal): not affected by LB--gait altered due to Parkinson's  Medications (nil/NSAIDS/analg/steroids/anticoag/other):  Other - Sleep  Medical allergies:  none  General health (excellent/good/fair/poor):  fair  Pertinent medical history:  Parkinson's disease  Imaging (None/Xray/MRI/Other):  None recent  Recent or major surgery (yes/no):  No--hx of hernia repair and colon surgery  Night pain (yes/no): no  Accidents (yes/no): no  Unexplained weight loss (yes/no): no  Barriers at home: no  Other red flags: no    EXAMINATION  Posture:   Sitting (good/fair/poor): poor  Standing (good/fair/poor):poor  Lordosis (red/acc/normal): red  Correction of posture (better/worse/no effect): worse  Lateral Shift (right/left/nil): nil  Relevant (yes/no):  na  Other Observations: na    Neurological:    Motor deficit:  Not assessed--no radicular complaints    Reflexes:  Not assessed--no radicular complaints    Sensory deficit:  Not assessed--no radicular complaints    Dural signs:  Not assessed--no radicular complaints      Movement Loss:   Bird Mod Min Nil Pain   Flexion x    Increases L LBP   Extension x    Increases L LBP   Side Gliding R x    Increases L LBP   Side Gliding L x    NE     Test Movements:   During: produces, abolishes, increases, decreases, no effect, centralizing, peripheralizing   After: better, worse, no better, no worse, no effect, centralized, peripheralized    Pretest symptoms standing:    Symptoms During Symptoms After ROM increased ROM decreased No Effect   FIS        Rep FIS        EIS        Rep EIS            Pretest symptoms lying: prone lying 1/10      Symptoms During Symptoms After ROM increased ROM  decreased No Effect   TIFF        Rep TIFF        EIL increases No Worse      Rep EIL Increases Worse      If required, pretest symptoms:    Symptoms During Symptoms After ROM increased ROM decreased No Effect   SGIS - R        Rep SGIS - R        SGIS - L        Rep SGIS - L          Static Tests:  Sitting slouched:    Sitting erect:    Standing slouched   Standing erect:    Lying prone in extension:  Increases L lBP, NW Long sitting:      Other Tests:   Provisional Classification:  Derangement - Asymmetrical, unilateral, symptoms above knee  Principle of Management:  Education:  Posture--use of lumbar roll in sitting, importance of posture   Equipment provided:  none  Mechanical therapy (Y/N):  y   Extension principle:  Prone lying x3-5 min, 4-6x/day, progress to DIANNE as able  Lateral Principle:    Flexion principle:    Other:      ASSESSMENT/PLAN:  Patient is a 77 year old male with lumbar complaints.  Provisional classification of derangement with directional preference for extension.  He has significantly limited lumbar ROM in all directions.  He had decreased pain after prone lying and gentle extension mobs.  Progression will be slow due to significant stiffness, but he should do well with an extension progression in addition to posture and body mechanics training to improve mobility, decrease pain, and improve overall function.     Patient has the following significant findings with corresponding treatment plan.                Diagnosis 1:  L LBP  Pain -  self management, education, directional preference exercise and home program  Decreased ROM/flexibility - manual therapy, therapeutic exercise and home program  Decreased function - therapeutic activities and home program  Impaired posture - neuro re-education and home program    Therapy Evaluation Codes:   1) History comprised of:   Personal factors that impact the plan of care:      None.    Comorbidity factors that impact the plan of care are:   "    Parkinson's disease.     Medications impacting care: None.  2) Examination of Body Systems comprised of:   Body structures and functions that impact the plan of care:      Lumbar spine.   Activity limitations that impact the plan of care are:      Bending, Standing and rising from sitting, walking.  3) Clinical presentation characteristics are:   Stable/Uncomplicated.  4) Decision-Making    Low complexity using standardized patient assessment instrument and/or   measureable assessment of functional outcome.  Cumulative Therapy Evaluation is: Low complexity.    Previous and current functional limitations:  (See Goal Flow Sheet for this information)    Short term and Long term goals: (See Goal Flow Sheet for this information)     Communication ability:  Patient appears to be able to clearly communicate and understand verbal and written communication and follow directions correctly.  Treatment Explanation - The following has been discussed with the patient:   RX ordered/plan of care  Anticipated outcomes  Possible risks and side effects  This patient would benefit from PT intervention to resume normal activities.   Rehab potential is good.    Frequency:  1 X week, once daily  Duration:  for 6 weeks  Discharge Plan:  Achieve all LTG.  Independent in home treatment program.  Reach maximal therapeutic benefit.    Caregiver Signature/Credentials _____________________________ Date ________       Treating Provider: Sonia Santos, PT   I have reviewed and certified the need for these services and plan of treatment while under my care.        PHYSICIAN'S SIGNATURE:   __________________________________  Date___________                 Vonda Lewis MD    Certification period:  Beginning of Cert date period: 07/03/18 to  End of Cert period date: 09/30/18     Functional Level Progress Report: Please see attached \"Goal Flow sheet for Functional level.\"    ____X____ Continue Services or       ________ DC Services     "            Service dates: From  SOC Date: 07/03/18 date to present

## 2018-07-18 ENCOUNTER — THERAPY VISIT (OUTPATIENT)
Dept: PHYSICAL THERAPY | Facility: CLINIC | Age: 78
End: 2018-07-18
Payer: MEDICARE

## 2018-07-18 DIAGNOSIS — M54.50 ACUTE LEFT-SIDED LOW BACK PAIN WITHOUT SCIATICA: ICD-10-CM

## 2018-07-18 PROCEDURE — 97110 THERAPEUTIC EXERCISES: CPT | Mod: GP | Performed by: PHYSICAL THERAPIST

## 2018-07-18 PROCEDURE — 97140 MANUAL THERAPY 1/> REGIONS: CPT | Mod: GP | Performed by: PHYSICAL THERAPIST

## 2018-07-25 ENCOUNTER — THERAPY VISIT (OUTPATIENT)
Dept: PHYSICAL THERAPY | Facility: CLINIC | Age: 78
End: 2018-07-25
Payer: MEDICARE

## 2018-07-25 DIAGNOSIS — M54.50 ACUTE LEFT-SIDED LOW BACK PAIN WITHOUT SCIATICA: ICD-10-CM

## 2018-07-25 PROCEDURE — 97140 MANUAL THERAPY 1/> REGIONS: CPT | Mod: GP | Performed by: PHYSICAL THERAPIST

## 2018-07-25 PROCEDURE — G8982 BODY POS GOAL STATUS: HCPCS | Mod: GP | Performed by: PHYSICAL THERAPIST

## 2018-07-25 PROCEDURE — 97110 THERAPEUTIC EXERCISES: CPT | Mod: GP | Performed by: PHYSICAL THERAPIST

## 2018-07-25 PROCEDURE — G8983 BODY POS D/C STATUS: HCPCS | Mod: GP | Performed by: PHYSICAL THERAPIST

## 2018-07-25 NOTE — LETTER
"[place] [facility] PHYSICAL THERAPY  [address] [place] [id number] [phone]    2018    Re: Dylan Davila   :   1940  MRN:  [id number]   REFERRING PHYSICIAN:   Vonda Lewis MD    [facility] ATHLETIC [facility] PHYSICAL [facility]    Date of Initial Evaluation:  7/3/2018  Visits:  Rxs Used: 3  Reason for Referral:  Acute left-sided low back pain without sciatica    DISCHARGE REPORT  Progress reporting period is from 2018 to 2018.       SUBJECTIVE  Subjective: \"Better.  There's no acute pain.\"  Patient reports he was able to mow the lawn over the past week without problems.  He did the REIL exercises 2x/day and NORTH 3x/day.  Feels the exercise have helped significantly.  He reports he can sit longer--up to 2 hours without difficulty--usually just does not sit much longer than this.  He has walked 1/2 mile without problems but has not tried getting back to 1 mile like he could do previously--thinks he could do without problems.  He has not had to stand for longer than 1 hour but thinks it would not bother him to stand longer.  He has not had pain with getting up from a chair for at least 2 weeks now.      Current Pain level: 0/10.     Initial Pain level: 6/10.   Changes in function:  Yes, improved walking and sitting tolerances.  Adverse reaction to treatment or activity: None    OBJECTIVE  Objective: Lumbar AROM:  flexion 66%, NE; extension 10%, NE.  Extension improved to 15-20% after REIL and extension mobs.  Pt to continue extension exercises at least 2-3x/day for maintenance and prevention of recurrence.       ASSESSMENT/PLAN  Patient has been seen for 3 visits with treatment focusing on lumbar extension exercises and mobilizations in addition to posture training to address LBP.  He has made good progress throughout treatment and reports no pain today.  He responds well to lumbar extension exercises, " but his extension ROM is still very limited, and he will need to continue with his exercises consistently to prevent further loss of motion and recurrence of symptoms.  He has a good HEP and should do well continuing with this independently at this point.    Updated problem list and treatment plan: Diagnosis 1:  LBP  Decreased ROM/flexibility - home program  Decreased joint mobility - home program  Decreased function - home program  Impaired posture - home program  STG/LTGs have been met or progress has been made towards goals:  No progress toward standing  Assessment of Progress: The patient's condition is improving.  Self Management Plans:  Patient has been instructed in a home treatment program.  Patient is independent in a home treatment program.  Patient  has been instructed in self management of symptoms.  Patient is independent in self management of symptoms.  Dylan continues to require the following intervention to meet STG and LTG's:  PT intervention is no longer required to meet STG/LTG.    Recommendations:  This patient is ready to be discharged from therapy and continue their home treatment program.    Thank you for your referral.    INQUIRIES  Therapist: Sonia Santos, PT   [facility] [place] [facility]  [address] [place] [address]  Phone: [phone]  Fax: [phone]

## 2018-07-25 NOTE — MR AVS SNAPSHOT
After Visit Summary   7/25/2018    Dylan Davila    MRN: [id number]           Patient Information     Date Of Birth          1940        Visit Information        Provider Department      7/25/2018 11:10 AM Sonia Santos PT [facility] Physical Therapy        Today's Diagnoses     Acute left-sided low back pain without sciatica           Follow-ups after your visit        Who to contact     If you have questions or need follow up information about today's clinic visit or your schedule please contact [facility] PHYSICAL THERAPY directly at [phone].  Normal or non-critical lab and imaging results will be communicated to you by SayHello LLChart, letter or phone within 4 business days after the clinic has received the results. If you do not hear from us within 7 days, please contact the clinic through Talking Layerst or phone. If you have a critical or abnormal lab result, we will notify you by phone as soon as possible.  Submit refill requests through 51wan or call your pharmacy and they will forward the refill request to us. Please allow 3 business days for your refill to be completed.          Additional Information About Your Visit        MyChart Information     51wan gives you secure access to your electronic health record. If you see a primary care provider, you can also send messages to your care team and make appointments. If you have questions, please call your primary care clinic.  If you do not have a primary care provider, please call [phone] and they will assist you.        Care EveryWhere ID     This is your Care EveryWhere ID. This could be used by other organizations to access your [place] medical records  [id number]         Blood Pressure from Last 3 Encounters:   02/14/18 150/90   12/22/17 132/80   10/13/17 122/78    Weight from Last 3 Encounters:   02/14/18 90.3 kg (199 lb)   12/22/17 90.7 kg (200 lb)    10/13/17 90.5 kg (199 lb 8 oz)              We Performed the Following     MILEY Progress Notes Report     Manual Ther Tech, 1+Regions, EA 15 min     Therapeutic Exercises        Primary Care Provider Office Phone # Fax #    Dereck Lomeli [phone]       [address] [facility] [address]        Equal Access to Services     [facility]: Hadii aad ku hadasho Soomaali, waaxda luqadaha, qaybta kaalmada adeegyada, waxay idiin hayaan adeeg kharash la'aan ah. So [facility] [phone].    ATENCIÓN: Si habla español, tiene a young disposición servicios gratuitos de asistencia lingüística. Elsi al [phone].    We comply with applicable federal civil rights laws and [place] laws. We do not discriminate on the basis of race, color, national origin, age, disability, sex, sexual orientation, or gender identity.            Thank you!     Thank you for choosing [place] [facility] PHYSICAL THERAPY  for your care. Our goal is always to provide you with excellent care. Hearing back from our patients is one way we can continue to improve our services. Please take a few minutes to complete the written survey that you may receive in the mail after your visit with us. Thank you!             Your Updated Medication List - Protect others around you: Learn how to safely use, store and throw away your medicines at www.disposemymeds.org.          This list is accurate as of 7/25/18 12:03 PM.  Always use your most recent med list.                   Brand Name Dispense Instructions for use Diagnosis    aspirin 81 MG tablet     100    1 tab po QD (Once per day)        atorvastatin 10 MG tablet    LIPITOR    90 tablet    Take 1 tablet (10 mg) by mouth At Bedtime    Hyperlipidemia LDL goal <100, Abnormal cardiac CT angiography       * AVODART 0.5 MG capsule   Generic drug:  dutasteride     3 months    1 CAPSULE DAILY    Urinary sys symptom NEC       * dutasteride 0.5 MG capsule    AVODART     90 capsule    Take 1 capsule (0.5 mg) by mouth daily    BPH (benign prostatic hyperplasia)       co-enzyme Q-10 50 MG Caps      Take  by mouth.        ketoconazole 2 % cream    NIZORAL          metroNIDAZOLE 0.75 % cream    METROCREAM          MULTIVITAMIN TABS   OR      1 tab qd        sildenafil 100 MG tablet    VIAGRA    7 tablet    Take  by mouth daily as needed for erectile dysfunction. 0.5 to 1    Impotence of organic origin       tolterodine 4 MG 24 hr capsule    DETROL LA    90 capsule    Take 1 capsule (4 mg) by mouth daily    Urge incontinence of urine       * zolpidem 5 MG tablet    AMBIEN    30 tablet    TAKE ONE-HALF TO ONE TABLET BY MOUTH ONCE DAILY IN THE EVENING AS NEEDED FOR SLEEP    Persistent insomnia, Sedative, hypnotic or anxiolytic dependence (H)       * zolpidem 6.25 MG CR tablet    AMBIEN CR    30 tablet    TAKE ONE TABLET BY MOUTH ONCE DAILY IN THE EVENING AS NEEDED FOR SLEEP    Chronic insomnia       * Notice:  This list has 4 medication(s) that are the same as other medications prescribed for you. Read the directions carefully, and ask your doctor or other care provider to review them with you.

## 2018-07-25 NOTE — PROGRESS NOTES
"Subjective:  HPI                    Objective:  System    Physical Exam    General     ROS    Assessment/Plan:    DISCHARGE REPORT    Progress reporting period is from 07/03/2018 to 07/25/2018.       SUBJECTIVE  Subjective: \"Better.  There's no acute pain.\"  Patient reports he was able to mow the lawn over the past week without problems.  He did the REIL exercises 2x/day and NORTH 3x/day.  Feels the exercise have helped significantly.  He reports he can sit longer--up to 2 hours without difficulty--usually just does not sit much longer than this.  He has walked 1/2 mile without problems but has not tried getting back to 1 mile like he could do previously--thinks he could do without problems.  He has not had to stand for longer than 1 hour but thinks it would not bother him to stand longer.  He has not had pain with getting up from a chair for at least 2 weeks now.      Current Pain level: 0/10.     Initial Pain level: 6/10.   Changes in function:  Yes, improved walking and sitting tolerances.  Adverse reaction to treatment or activity: None    OBJECTIVE  Objective: Lumbar AROM:  flexion 66%, NE; extension 10%, NE.  Extension improved to 15-20% after REIL and extension mobs.  Pt to continue extension exercises at least 2-3x/day for maintenance and prevention of recurrence.       ASSESSMENT/PLAN  Patient has been seen for 3 visits with treatment focusing on lumbar extension exercises and mobilizations in addition to posture training to address LBP.  He has made good progress throughout treatment and reports no pain today.  He responds well to lumbar extension exercises, but his extension ROM is still very limited, and he will need to continue with his exercises consistently to prevent further loss of motion and recurrence of symptoms.  He has a good HEP and should do well continuing with this independently at this point.    Updated problem list and treatment plan: Diagnosis 1:  LBP  Decreased ROM/flexibility - home " program  Decreased joint mobility - home program  Decreased function - home program  Impaired posture - home program  STG/LTGs have been met or progress has been made towards goals:  No progress toward standing  Assessment of Progress: The patient's condition is improving.  Self Management Plans:  Patient has been instructed in a home treatment program.  Patient is independent in a home treatment program.  Patient  has been instructed in self management of symptoms.  Patient is independent in self management of symptoms.  Dylan continues to require the following intervention to meet STG and LTG's:  PT intervention is no longer required to meet STG/LTG.    Recommendations:  This patient is ready to be discharged from therapy and continue their home treatment program.    Please refer to the daily flowsheet for treatment today, total treatment time and time spent performing 1:1 timed codes.

## 2018-10-16 ENCOUNTER — OFFICE VISIT (OUTPATIENT)
Dept: INTERNAL MEDICINE | Facility: CLINIC | Age: 78
End: 2018-10-16
Payer: COMMERCIAL

## 2018-10-16 VITALS
RESPIRATION RATE: 14 BRPM | BODY MASS INDEX: 24.65 KG/M2 | TEMPERATURE: 97.7 F | DIASTOLIC BLOOD PRESSURE: 78 MMHG | SYSTOLIC BLOOD PRESSURE: 136 MMHG | OXYGEN SATURATION: 98 % | HEART RATE: 80 BPM | WEIGHT: 192 LBS

## 2018-10-16 DIAGNOSIS — R93.1 ABNORMAL CARDIAC CT ANGIOGRAPHY: ICD-10-CM

## 2018-10-16 DIAGNOSIS — R35.89 POLYURIA: ICD-10-CM

## 2018-10-16 DIAGNOSIS — F51.04 CHRONIC INSOMNIA: ICD-10-CM

## 2018-10-16 DIAGNOSIS — Z23 NEED FOR PROPHYLACTIC VACCINATION AND INOCULATION AGAINST INFLUENZA: ICD-10-CM

## 2018-10-16 DIAGNOSIS — N52.9 IMPOTENCE OF ORGANIC ORIGIN: ICD-10-CM

## 2018-10-16 DIAGNOSIS — Z00.00 MEDICARE ANNUAL WELLNESS VISIT, SUBSEQUENT: Primary | ICD-10-CM

## 2018-10-16 DIAGNOSIS — E78.5 HYPERLIPIDEMIA LDL GOAL <100: ICD-10-CM

## 2018-10-16 DIAGNOSIS — G20.A1 PARKINSON'S DISEASE (H): ICD-10-CM

## 2018-10-16 LAB
ALBUMIN UR-MCNC: NEGATIVE MG/DL
ANION GAP SERPL CALCULATED.3IONS-SCNC: 6 MMOL/L (ref 3–14)
APPEARANCE UR: CLEAR
BILIRUB UR QL STRIP: NEGATIVE
BUN SERPL-MCNC: 15 MG/DL (ref 7–30)
CALCIUM SERPL-MCNC: 9.9 MG/DL (ref 8.5–10.1)
CHLORIDE SERPL-SCNC: 105 MMOL/L (ref 94–109)
CHOLEST SERPL-MCNC: 124 MG/DL
CO2 SERPL-SCNC: 29 MMOL/L (ref 20–32)
COLOR UR AUTO: YELLOW
CREAT SERPL-MCNC: 1.18 MG/DL (ref 0.66–1.25)
GFR SERPL CREATININE-BSD FRML MDRD: 60 ML/MIN/1.7M2
GLUCOSE SERPL-MCNC: 90 MG/DL (ref 70–99)
GLUCOSE UR STRIP-MCNC: NEGATIVE MG/DL
HDLC SERPL-MCNC: 48 MG/DL
HGB UR QL STRIP: ABNORMAL
KETONES UR STRIP-MCNC: ABNORMAL MG/DL
LDLC SERPL CALC-MCNC: 53 MG/DL
LEUKOCYTE ESTERASE UR QL STRIP: NEGATIVE
MUCOUS THREADS #/AREA URNS LPF: PRESENT /LPF
NITRATE UR QL: NEGATIVE
NONHDLC SERPL-MCNC: 76 MG/DL
PH UR STRIP: 5.5 PH (ref 5–7)
POTASSIUM SERPL-SCNC: 4 MMOL/L (ref 3.4–5.3)
RBC #/AREA URNS AUTO: ABNORMAL /HPF
SODIUM SERPL-SCNC: 140 MMOL/L (ref 133–144)
SOURCE: ABNORMAL
SP GR UR STRIP: 1.02 (ref 1–1.03)
TRIGL SERPL-MCNC: 115 MG/DL
UROBILINOGEN UR STRIP-ACNC: 0.2 EU/DL (ref 0.2–1)
WBC #/AREA URNS AUTO: ABNORMAL /HPF

## 2018-10-16 PROCEDURE — 81001 URINALYSIS AUTO W/SCOPE: CPT | Performed by: INTERNAL MEDICINE

## 2018-10-16 PROCEDURE — 36415 COLL VENOUS BLD VENIPUNCTURE: CPT | Performed by: INTERNAL MEDICINE

## 2018-10-16 PROCEDURE — 80048 BASIC METABOLIC PNL TOTAL CA: CPT | Performed by: INTERNAL MEDICINE

## 2018-10-16 PROCEDURE — 80061 LIPID PANEL: CPT | Performed by: INTERNAL MEDICINE

## 2018-10-16 PROCEDURE — G0008 ADMIN INFLUENZA VIRUS VAC: HCPCS | Performed by: INTERNAL MEDICINE

## 2018-10-16 PROCEDURE — 90662 IIV NO PRSV INCREASED AG IM: CPT | Performed by: INTERNAL MEDICINE

## 2018-10-16 PROCEDURE — 99397 PER PM REEVAL EST PAT 65+ YR: CPT | Mod: 25 | Performed by: INTERNAL MEDICINE

## 2018-10-16 RX ORDER — ATORVASTATIN CALCIUM 10 MG/1
10 TABLET, FILM COATED ORAL AT BEDTIME
Qty: 90 TABLET | Refills: 3 | Status: SHIPPED | OUTPATIENT
Start: 2018-10-16 | End: 2019-10-26

## 2018-10-16 RX ORDER — ZOLPIDEM TARTRATE 6.25 MG/1
TABLET, FILM COATED, EXTENDED RELEASE ORAL
Qty: 30 TABLET | Refills: 5 | Status: SHIPPED | OUTPATIENT
Start: 2018-11-16 | End: 2019-04-15

## 2018-10-16 RX ORDER — SILDENAFIL 100 MG/1
TABLET, FILM COATED ORAL
Qty: 24 TABLET | Refills: 11 | Status: SHIPPED | OUTPATIENT
Start: 2018-10-16 | End: 2019-08-16

## 2018-10-16 ASSESSMENT — ACTIVITIES OF DAILY LIVING (ADL)
CURRENT_FUNCTION: NO ASSISTANCE NEEDED
I_NEED_ASSISTANCE_FOR_THE_FOLLOWING_DAILY_ACTIVITIES:: NO ASSISTANCE IS NEEDED

## 2018-10-16 NOTE — PROGRESS NOTES
SUBJECTIVE:   Dylan Davila is a 77 year old male who presents for Preventive Visit.    Are you in the first 12 months of your Medicare coverage?  No    Physical   Annual:     Getting at least 3 servings of Calcium per day:  Yes    Bi-annual eye exam:  Yes    Dental care twice a year:  Yes    Sleep apnea or symptoms of sleep apnea:  Daytime drowsiness    Diet:  Regular (no restrictions)    Frequency of exercise:  2-3 days/week    Duration of exercise:  45-60 minutes    Taking medications regularly:  Yes    Medication side effects:  None    Additional concerns today:  YES    Ability to successfully perform activities of daily living: no assistance needed    Home Safety:  No safety concerns identified    Hearing Impairment: difficulty following a conversation in a noisy restaurant or crowded room    Question 1.  In the last 12 months: We worried food would run out before we had money to buy more. Never True    Question 2.  In the last 12 months: The food we bought just didn't last and we didn't have money to buy more. Never True    Did the patient answer Sometimes True or Often True to EITHER Question 1 or Question 2? No      Fall risk:  Fallen 2 or more times in the past year?: No  Any fall with injury in the past year?: No    COGNITIVE SCREEN  1) Repeat 3 items (Leader, Season, Table)    2) Clock draw: NORMAL  3) 3 item recall: Recalls 3 objects  Results: 3 items recalled: COGNITIVE IMPAIRMENT LESS LIKELY    Mini-CogTM Copyright PATY Pop. Licensed by the author for use in [facility]; reprinted with permission (brant@.[facility]). All rights reserved.        Reviewed and updated as needed this visit by clinical staff  Tobacco  Allergies  Meds  Med Hx  Surg Hx  Fam Hx  Soc Hx        Reviewed and updated as needed this visit by Provider        Social History   Substance Use Topics     Smoking status: Former Smoker     Years: 3.00     Types: Pipe     Quit date: 1/1/1975     Smokeless tobacco: Never  Used     Alcohol use 0.0 oz/week     0 Standard drinks or equivalent per week      Comment: 1-2 glasses wine socially       Alcohol Use 10/16/2018   If you drink alcohol do you typically have greater than 3 drinks per day OR greater than 7 drinks per week? No     1. Parkinson's- saw neuro initially. Has not f/u in last 2 yrs. Not too interested in meds. Feels symptoms stable. Tremor but no significant issues w/walking, balance.   2. Polyuria- has hx of BPH . Notes increase in daytime need to urinate> nocturia.      Today's PHQ-2 Score:   PHQ-2 ( 1999 [employer]) 10/16/2018   Q1: Little interest or pleasure in doing things 0   Q2: Feeling down, depressed or hopeless 0   PHQ-2 Score 0   Q1: Little interest or pleasure in doing things Not at all   Q2: Feeling down, depressed or hopeless Not at all   PHQ-2 Score 0       Do you feel safe in your environment - Yes    Do you have a Health Care Directive?: No: Advance care planning reviewed with patient; information given to patient to review.    Current providers sharing in care for this patient include:   Patient Care Team:  Dereck Lomeli MD as PCP - General (Internal Medicine)    The following health maintenance items are reviewed in Epic and correct as of today:  Health Maintenance   Topic Date Due     ADVANCE DIRECTIVE PLANNING Q5 YRS  07/18/2017     INFLUENZA VACCINE (1) 09/01/2018     BMP Q1 YR  10/13/2018     LIPID MONITORING Q1 YEAR  10/13/2018     FALL RISK ASSESSMENT  10/13/2018     PHQ-2 Q1 YR  10/13/2018     TETANUS Q10 YR  10/24/2027     PNEUMOCOCCAL  Completed               Review of Systems  Constitutional, HEENT, cardiovascular, pulmonary, GI, - polyuria, musculoskeletal, neuro, skin, endocrine and psych systems are negative, except as otherwise noted.    OBJECTIVE:   /78  Pulse 80  Temp 97.7  F (36.5  C) (Oral)  Resp 14  Wt 192 lb (87.1 kg)  SpO2 98%  BMI 24.65 kg/m2 Estimated body mass index is 24.65 kg/(m^2) as calculated from the  "following:    Height as of 2/14/18: 6' 2\" (1.88 m).    Weight as of this encounter: 192 lb (87.1 kg).  Physical Exam  GENERAL: healthy, alert and no distress  EYES: Eyes grossly normal to inspection, PERRL and conjunctivae and sclerae normal  HENT: ear canals and TM's normal, nose and mouth without ulcers or lesions  NECK: no adenopathy, no asymmetry, masses, or scars and thyroid normal to palpation  RESP: lungs clear to auscultation - no rales, rhonchi or wheezes  CV: regular rate and rhythm, normal S1 S2, no S3 or S4, no murmur, click or rub, no peripheral edema and peripheral pulses strong  ABDOMEN: soft, nontender, no hepatosplenomegaly, no masses and bowel sounds normal  MS: no gross musculoskeletal defects noted, no edema  SKIN: no suspicious lesions or rashes  NEURO: resting tremor. Gait normal w/no shuffling.   PSYCH: mentation appears normal, affect normal/bright    Results for orders placed or performed in visit on 10/16/18   *UA reflex to Microscopic and Culture ([place] and [facility] (except [place] and [place])   Result Value Ref Range    Color Urine Yellow     Appearance Urine Clear     Glucose Urine Negative NEG^Negative mg/dL    Bilirubin Urine Negative NEG^Negative    Ketones Urine Trace (A) NEG^Negative mg/dL    Specific Gravity Urine 1.025 1.003 - 1.035    Blood Urine Trace (A) NEG^Negative    pH Urine 5.5 5.0 - 7.0 pH    Protein Albumin Urine Negative NEG^Negative mg/dL    Urobilinogen Urine 0.2 0.2 - 1.0 EU/dL    Nitrite Urine Negative NEG^Negative    Leukocyte Esterase Urine Negative NEG^Negative    Source Midstream Urine    Urine Microscopic   Result Value Ref Range    WBC Urine 0 - 5 OTO5^0 - 5 /HPF    RBC Urine O - 2 OTO2^O - 2 /HPF    Mucous Urine Present (A) NEG^Negative /LPF       ASSESSMENT / PLAN:   1. Medicare annual wellness visit, subsequent  uptodate on screening, labs. immunizations      2. Parkinson's disease (H)  Would like him to f/u with neuro. On exam appears " "relatively stable    3. Hyperlipidemia LDL goal <100  4. minimal calcified plaque on CV CT angio  Cont statin   - atorvastatin (LIPITOR) 10 MG tablet; Take 1 tablet (10 mg) by mouth At Bedtime  Dispense: 90 tablet; Refill: 3  - Basic metabolic panel    5. Chronic insomnia  - zolpidem (AMBIEN CR) 6.25 MG CR tablet; TAKE ONE TABLET BY MOUTH ONCE DAILY IN THE EVENING AS NEEDED FOR SLEEP  Dispense: 30 tablet; Refill: 5    6. IMPOTENCE, ORGANIC ORIGN  - sildenafil (VIAGRA) 100 MG tablet; Take  by mouth daily as needed for erectile dysfunction. 0.5 to 1  Dispense: 24 tablet; Refill: 11    7. Polyuria  No indication of any cause other than BPH  con't meds and f/u urology  - *UA reflex to Microscopic and Culture ([facility] (except [place] and [place])    8. Need for prophylactic vaccination and inoculation against influenza  - FLU VACCINE, INCREASED ANTIGEN, PRESV FREE, AGE 65+ [23241]  - ADMIN INFLUENZA (For MEDICARE Patients ONLY) []    End of Life Planning:  Patient currently has an advanced directive: No.  I have verified the patient's ablity to prepare an advanced directive/make health care decisions.  Literature was provided to assist patient in preparing an advanced directive.    COUNSELING:  Reviewed preventive health counseling, as reflected in patient instructions    BP Readings from Last 1 Encounters:   10/16/18 136/78     Estimated body mass index is 24.65 kg/(m^2) as calculated from the following:    Height as of 2/14/18: 6' 2\" (1.88 m).    Weight as of this encounter: 192 lb (87.1 kg).           reports that he quit smoking about 43 years ago. His smoking use included Pipe. He quit after 3.00 years of use. He has never used smokeless tobacco.      Appropriate preventive services were discussed with this patient, including applicable screening as appropriate for cardiovascular disease, diabetes, osteopenia/osteoporosis, and glaucoma.  As appropriate for age/gender, discussed screening " for colorectal cancer, prostate cancer, breast cancer, and cervical cancer. Checklist reviewing preventive services available has been given to the patient.    Reviewed patients plan of care and provided an AVS. The Intermediate Care Plan ( asthma action plan, low back pain action plan, and migraine action plan) for Dylan meets the Care Plan requirement. This Care Plan has been established and reviewed with the Patient.    Counseling Resources:  ATP IV Guidelines  Pooled Cohorts Equation Calculator  Breast Cancer Risk Calculator  FRAX Risk Assessment  ICSI Preventive Guidelines  Dietary Guidelines for Americans, 2010  [facility]'s MyPlate  ASA Prophylaxis  Lung CA Screening    Dereck Lomeli MD  [facility]  Answers for HPI/ROS submitted by the patient on 10/16/2018   PHQ-2 Score: 0      Injectable Influenza Immunization Documentation    1.  Is the person to be vaccinated sick today?   No    2. Does the person to be vaccinated have an allergy to a component   of the vaccine?   No  Egg Allergy Algorithm Link    3. Has the person to be vaccinated ever had a serious reaction   to influenza vaccine in the past?   No    4. Has the person to be vaccinated ever had Guillain-Barré syndrome?   No    Form completed by Brook Perry

## 2018-10-16 NOTE — MR AVS SNAPSHOT
After Visit Summary   10/16/2018    Dylan Davila    MRN: [id number]           Patient Information     Date Of Birth          1940        Visit Information        Provider Department      10/16/2018 7:20 AM Dereck oLmeli MD [facility]        Today's Diagnoses     Medicare annual wellness visit, subsequent    -  1    Hyperlipidemia LDL goal <100        minimal calcified plaque on CV CT angio        Chronic insomnia        IMPOTENCE, ORGANIC ORIGN        Parkinson's disease (H)        Polyuria          Care Instructions      Preventive Health Recommendations:       Male Ages 65 and over    Yearly exam:             See your health care provider every year in order to  o   Review health changes.   o   Discuss preventive care.    o   Review your medicines if your doctor has prescribed any.    Talk with your health care provider about whether you should have a test to screen for prostate cancer (PSA).    Every 3 years, have a diabetes test (fasting glucose). If you are at risk for diabetes, you should have this test more often.    Every 5 years, have a cholesterol test. Have this test more often if you are at risk for high cholesterol or heart disease.     Every 10 years, have a colonoscopy. Or, have a yearly FIT test (stool test). These exams will check for colon cancer.    Talk to with your health care provider about screening for Abdominal Aortic Aneurysm if you have a family history of AAA or have a history of smoking.  Shots:     Get a flu shot each year.     Get a tetanus shot every 10 years.     Talk to your doctor about your pneumonia vaccines. There are now two you should receive - Pneumovax (PPSV 23) and Prevnar (PCV 13).    Talk to your pharmacist about a shingles vaccine.     Talk to your doctor about the hepatitis B vaccine.  Nutrition:     Eat at least 5 servings of fruits and vegetables each day.     Eat whole-grain bread, whole-wheat pasta and brown  rice instead of white grains and rice.     Get adequate Calcium and Vitamin D.   Lifestyle    Exercise for at least 150 minutes a week (30 minutes a day, 5 days a week). This will help you control your weight and prevent disease.     Limit alcohol to one drink per day.     No smoking.     Wear sunscreen to prevent skin cancer.     See your dentist every six months for an exam and cleaning.     See your eye doctor every 1 to 2 years to screen for conditions such as glaucoma, macular degeneration and cataracts.          Follow-ups after your visit        Follow-up notes from your care team     Return in about 6 months (around 4/16/2019) for Follow up visit.      Your next 10 appointments already scheduled     Dec 05, 2018 10:30 AM CST   Return Visit with Frandy Christiansen MD   [facility] ([facility])    [address]  [address]  [facility] [id number] [phone]              Who to contact     If you have questions or need follow up information about today's clinic visit or your schedule please contact [facility] directly at [phone].  Normal or non-critical lab and imaging results will be communicated to you by [employer], letter or phone within 4 business days after the clinic has received the results. If you do not hear from us within 7 days, please contact the clinic through [employer] or phone. If you have a critical or abnormal lab result, we will notify you by phone as soon as possible.  Submit refill requests through [employer] or call your pharmacy and they will forward the refill request to us. Please allow 3 business days for your refill to be completed.          Additional Information About Your Visit        [employer] Information     [employer] gives you secure access to your electronic health record. If you see a primary care provider, you can also send messages to your care team and make appointments. If you have questions,  please call your primary care clinic.  If you do not have a primary care provider, please call [phone] and they will assist you.        Care EveryWhere ID     This is your Care EveryWhere ID. This could be used by other organizations to access your [facility] medical records  [id number]        Your Vitals Were     Pulse Temperature Respirations Pulse Oximetry BMI (Body Mass Index)       80 97.7  F (36.5  C) (Oral) 14 98% 24.65 kg/m2        Blood Pressure from Last 3 Encounters:   10/16/18 144/76   02/14/18 150/90   12/22/17 132/80    Weight from Last 3 Encounters:   10/16/18 192 lb (87.1 kg)   02/14/18 199 lb (90.3 kg)   12/22/17 200 lb (90.7 kg)              We Performed the Following     *UA reflex to Microscopic and Culture ([place] and [facility] (except [place] and [place])     Basic metabolic panel     Lipid panel reflex to direct LDL Fasting          Today's Medication Changes          These changes are accurate as of 10/16/18  8:06 AM.  If you have any questions, ask your nurse or doctor.               These medicines have changed or have updated prescriptions.        Dose/Directions    dutasteride 0.5 MG capsule   Commonly known as:  AVODART   This may have changed:  Another medication with the same name was removed. Continue taking this medication, and follow the directions you see here.   Used for:  BPH (benign prostatic hyperplasia)   Changed by:  Dereck Lomeli MD        Dose:  0.5 mg   Take 1 capsule (0.5 mg) by mouth daily   Quantity:  90 capsule   Refills:  3       sildenafil 100 MG tablet   Commonly known as:  VIAGRA   This may have changed:    - how to take this  - when to take this  - reasons to take this  - additional instructions   Used for:  Impotence of organic origin   Changed by:  Dereck Lomeli MD        Take  by mouth daily as needed for erectile dysfunction. 0.5 to 1   Quantity:  24 tablet   Refills:  11       zolpidem 6.25 MG CR tablet   Commonly known as:  AMBIEN  CR   This may have changed:    - These instructions start on 11/16/2018. If you are unsure what to do until then, ask your doctor or other care provider.  - Another medication with the same name was removed. Continue taking this medication, and follow the directions you see here.   Used for:  Chronic insomnia   Changed by:  Dereck Lomeli MD        Start taking on:  11/16/2018   TAKE ONE TABLET BY MOUTH ONCE DAILY IN THE EVENING AS NEEDED FOR SLEEP   Quantity:  30 tablet   Refills:  5         Stop taking these medicines if you haven't already. Please contact your care team if you have questions.     tolterodine 4 MG 24 hr capsule   Commonly known as:  DETROL LA   Stopped by:  Dereck Lomeli MD                Where to get your medicines      These medications were sent to [facility] [address] [place] [address]     Phone:  [phone]     atorvastatin 10 MG tablet         Some of these will need a paper prescription and others can be bought over the counter.  Ask your nurse if you have questions.     Bring a paper prescription for each of these medications     sildenafil 100 MG tablet    zolpidem 6.25 MG CR tablet                Primary Care Provider Office Phone # Fax #    Dereck Lomeli [phone]       [address] [facility] [address]        Equal Access to Services     ANDREA ROJAS : Hadamaury kim hadasho Soomaali, waaxda luqadaha, qaybta kaalmada adeegyada, wayne potter . So [facility] [phone].    ATENCIÓN: Si habla español, tiene a young disposición servicios gratuitos de asistencia lingüística. Llame al [phone].    We comply with applicable federal civil rights laws and [place] laws. We do not discriminate on the basis of race, color, national origin, age, disability, sex, sexual orientation, or gender identity.            Thank you!     Thank you for choosing  [facility]  for your care. Our goal is always to provide you with excellent care. Hearing back from our patients is one way we can continue to improve our services. Please take a few minutes to complete the written survey that you may receive in the mail after your visit with us. Thank you!             Your Updated Medication List - Protect others around you: Learn how to safely use, store and throw away your medicines at www.disposemymeds.org.          This list is accurate as of 10/16/18  8:06 AM.  Always use your most recent med list.                   Brand Name Dispense Instructions for use Diagnosis    aspirin 81 MG tablet     100    1 tab po QD (Once per day)        atorvastatin 10 MG tablet    LIPITOR    90 tablet    Take 1 tablet (10 mg) by mouth At Bedtime    Hyperlipidemia LDL goal <100, Abnormal cardiac CT angiography       co-enzyme Q-10 50 MG Caps      Take 100 mg by mouth        dutasteride 0.5 MG capsule    AVODART    90 capsule    Take 1 capsule (0.5 mg) by mouth daily    BPH (benign prostatic hyperplasia)       ketoconazole 2 % cream    NIZORAL          metroNIDAZOLE 0.75 % cream    METROCREAM          MULTIVITAMIN TABS   OR      1 tab qd        sildenafil 100 MG tablet    VIAGRA    24 tablet    Take  by mouth daily as needed for erectile dysfunction. 0.5 to 1    Impotence of organic origin       zolpidem 6.25 MG CR tablet   Start taking on:  11/16/2018    AMBIEN CR    30 tablet    TAKE ONE TABLET BY MOUTH ONCE DAILY IN THE EVENING AS NEEDED FOR SLEEP    Chronic insomnia

## 2018-11-29 DIAGNOSIS — N40.0 BPH (BENIGN PROSTATIC HYPERPLASIA): Primary | ICD-10-CM

## 2018-12-05 ENCOUNTER — OFFICE VISIT (OUTPATIENT)
Dept: UROLOGY | Facility: CLINIC | Age: 78
End: 2018-12-05
Payer: COMMERCIAL

## 2018-12-05 VITALS
BODY MASS INDEX: 25.18 KG/M2 | SYSTOLIC BLOOD PRESSURE: 130 MMHG | HEART RATE: 90 BPM | OXYGEN SATURATION: 97 % | HEIGHT: 73 IN | WEIGHT: 190 LBS | DIASTOLIC BLOOD PRESSURE: 80 MMHG

## 2018-12-05 DIAGNOSIS — N39.41 URGE INCONTINENCE OF URINE: Primary | ICD-10-CM

## 2018-12-05 DIAGNOSIS — Z80.42 FAMILY HISTORY OF PROSTATE CANCER: ICD-10-CM

## 2018-12-05 DIAGNOSIS — N40.0 BENIGN PROSTATIC HYPERPLASIA WITHOUT LOWER URINARY TRACT SYMPTOMS: ICD-10-CM

## 2018-12-05 LAB
ALBUMIN UR-MCNC: NEGATIVE MG/DL
APPEARANCE UR: CLEAR
BILIRUB UR QL STRIP: NEGATIVE
COLOR UR AUTO: YELLOW
GLUCOSE UR STRIP-MCNC: NEGATIVE MG/DL
HGB UR QL STRIP: NEGATIVE
KETONES UR STRIP-MCNC: NEGATIVE MG/DL
LEUKOCYTE ESTERASE UR QL STRIP: NEGATIVE
NITRATE UR QL: NEGATIVE
PH UR STRIP: 5.5 PH (ref 5–7)
PSA SERPL-MCNC: 0.98 NG/ML (ref 0–4)
SOURCE: NORMAL
SP GR UR STRIP: 1.01 (ref 1–1.03)
UROBILINOGEN UR STRIP-ACNC: 0.2 EU/DL (ref 0.2–1)

## 2018-12-05 PROCEDURE — 36415 COLL VENOUS BLD VENIPUNCTURE: CPT | Performed by: UROLOGY

## 2018-12-05 PROCEDURE — 99213 OFFICE O/P EST LOW 20 MIN: CPT | Performed by: UROLOGY

## 2018-12-05 PROCEDURE — 81003 URINALYSIS AUTO W/O SCOPE: CPT | Performed by: UROLOGY

## 2018-12-05 PROCEDURE — 84153 ASSAY OF PSA TOTAL: CPT | Performed by: UROLOGY

## 2018-12-05 RX ORDER — DUTASTERIDE 0.5 MG/1
0.5 CAPSULE, LIQUID FILLED ORAL DAILY
Qty: 90 CAPSULE | Refills: 3 | Status: SHIPPED | OUTPATIENT
Start: 2018-12-05 | End: 2020-02-13

## 2018-12-05 RX ORDER — TOLTERODINE TARTRATE 2 MG/1
2 TABLET, EXTENDED RELEASE ORAL 2 TIMES DAILY
Qty: 180 TABLET | Refills: 3 | Status: SHIPPED | OUTPATIENT
Start: 2018-12-05 | End: 2020-02-13

## 2018-12-05 RX ORDER — TOLTERODINE 4 MG/1
CAPSULE, EXTENDED RELEASE ORAL
COMMUNITY
Start: 2018-06-20 | End: 2019-08-16

## 2018-12-05 ASSESSMENT — PAIN SCALES - GENERAL: PAINLEVEL: NO PAIN (0)

## 2018-12-05 NOTE — PROGRESS NOTES
Dylan Davila is a very kind 78-year-old male with Parkinson's disease and urgency incontinence. He also has a strong family history of prostate cancer. He takes Avodart daily and tolterodine 2 mg b.i.d.  His urinalysis today is unremarkable. His PSA is stable at 0.98  Other past medical history: Basal cell carcinoma, BPH, lumbar disc disease, ED, history of malignant melanoma, osteopenia, hyperlipidemia, history of pneumonia, history of prostatitis, resting tremor, hearing loss, eye surgeries, tonsillectomy, herniorrhaphy, hemorrhoidectomy, vasectomy, former smoker  Family history: Brother and father had prostate cancer  Medications: Low-dose aspirin, Lipitor, coenzyme Q10, Avodart, Nizoral cream, MetroCream, multivitamin, Viagra, tolterodine, Ambien  Allergies: Calcium, codeine, tamsulosin  Exam: Alert and oriented, normal appearance other than Parkinsonian features. Normal sphincter tone, no rectal mass or impaction, small benign prostate, normal seminal vesicles  Assessment: Family history of prostate cancer-no evidence of prostate cancer today  Urgency incontinence secondary to Parkinson's-continue tolterodine  Plan: See me yearly with PSA and for digital rectal exam

## 2018-12-05 NOTE — LETTER
12/5/2018       RE: Dylan Davila  [address] [facility] [address]     Dear Colleague,    Thank you for referring your patient, Dylan Davila, to the [facility]OLOG[facility] [place] at [facility]. Please see a copy of my visit note below.    Dylan Davila is a very kind 78-year-old male with Parkinson's disease and urgency incontinence. He also has a strong family history of prostate cancer. He takes Avodart daily and tolterodine 2 mg b.i.d.  His urinalysis today is unremarkable. His PSA is stable at 0.98  Other past medical history: Basal cell carcinoma, BPH, lumbar disc disease, ED, history of malignant melanoma, osteopenia, hyperlipidemia, history of pneumonia, history of prostatitis, resting tremor, hearing loss, eye surgeries, tonsillectomy, herniorrhaphy, hemorrhoidectomy, vasectomy, former smoker  Family history: Brother and father had prostate cancer  Medications: Low-dose aspirin, Lipitor, coenzyme Q10, Avodart, Nizoral cream, MetroCream, multivitamin, Viagra, tolterodine, Ambien  Allergies: Calcium, codeine, tamsulosin  Exam: Alert and oriented, normal appearance other than Parkinsonian features. Normal sphincter tone, no rectal mass or impaction, small benign prostate, normal seminal vesicles  Assessment: Family history of prostate cancer-no evidence of prostate cancer today  Urgency incontinence secondary to Parkinson's-continue tolterodine  Plan: See me yearly with PSA and for digital rectal exam    Again, thank you for allowing me to participate in the care of your patient.      Sincerely,    Frandy Christiansen MD

## 2018-12-05 NOTE — MR AVS SNAPSHOT
After Visit Summary   12/5/2018    Dylan Davila    MRN: [id number]           Patient Information     Date Of Birth          1940        Visit Information        Provider Department      12/5/2018 10:30 AM Frandy Christiansen MD [facility] [place]        Today's Diagnoses     Urge incontinence of urine    -  1    Benign prostatic hyperplasia without lower urinary tract symptoms        Family history of prostate cancer           Follow-ups after your visit        Follow-up notes from your care team     Return in about 1 year (around 12/5/2019) for PSA.      Who to contact     If you have questions or need follow up information about today's clinic visit or your schedule please contact [facility]OLOG[facility] [place] directly at [phone].  Normal or non-critical lab and imaging results will be communicated to you by Yeelinkhart, letter or phone within 4 business days after the clinic has received the results. If you do not hear from us within 7 days, please contact the clinic through [employer] or phone. If you have a critical or abnormal lab result, we will notify you by phone as soon as possible.  Submit refill requests through [employer] or call your pharmacy and they will forward the refill request to us. Please allow 3 business days for your refill to be completed.          Additional Information About Your Visit        MyChart Information     [employer] gives you secure access to your electronic health record. If you see a primary care provider, you can also send messages to your care team and make appointments. If you have questions, please call your primary care clinic.  If you do not have a primary care provider, please call [phone] and they will assist you.        Care EveryWhere ID     This is your Care EveryWhere ID. This could be used by other organizations to access your [facility] medical records  [id number]        Your Vitals Were  "    Pulse Height Pulse Oximetry BMI (Body Mass Index)          90 1.854 m (6' 1\") 97% 25.07 kg/m2         Blood Pressure from Last 3 Encounters:   12/05/18 130/80   10/16/18 136/78   02/14/18 150/90    Weight from Last 3 Encounters:   12/05/18 86.2 kg (190 lb)   10/16/18 87.1 kg (192 lb)   02/14/18 90.3 kg (199 lb)              We Performed the Following     PSA Diag Urologic Phys     UA without Microscopic          Today's Medication Changes          These changes are accurate as of 12/5/18 10:45 AM.  If you have any questions, ask your nurse or doctor.               These medicines have changed or have updated prescriptions.        Dose/Directions    * dutasteride 0.5 MG capsule   Commonly known as:  AVODART   This may have changed:  Another medication with the same name was added. Make sure you understand how and when to take each.   Used for:  BPH (benign prostatic hyperplasia)   Changed by:  Frandy Christiansen MD        Dose:  0.5 mg   Take 1 capsule (0.5 mg) by mouth daily   Quantity:  90 capsule   Refills:  3       * dutasteride 0.5 MG capsule   Commonly known as:  AVODART   This may have changed:  You were already taking a medication with the same name, and this prescription was added. Make sure you understand how and when to take each.   Used for:  Family history of prostate cancer   Changed by:  Frandy Christiansen MD        Dose:  0.5 mg   Take 1 capsule (0.5 mg) by mouth daily   Quantity:  90 capsule   Refills:  3       * tolterodine ER 4 MG 24 hr capsule   Commonly known as:  DETROL LA   This may have changed:  Another medication with the same name was added. Make sure you understand how and when to take each.   Changed by:  Frandy Christiansen MD        Refills:  0       * tolterodine 2 MG tablet   Commonly known as:  DETROL   This may have changed:  You were already taking a medication with the same name, and this prescription was added. Make sure you understand how and when to take each.   Used for:  " Urge incontinence of urine   Changed by:  Frandy Christiansen MD        Dose:  2 mg   Take 1 tablet (2 mg) by mouth 2 times daily   Quantity:  180 tablet   Refills:  3       * Notice:  This list has 4 medication(s) that are the same as other medications prescribed for you. Read the directions carefully, and ask your doctor or other care provider to review them with you.         Where to get your medicines      These medications were sent to [facility] [address] [place] [address] [address] [facility]  [address] [facility] [address]     Phone:  [phone]     dutasteride 0.5 MG capsule    tolterodine 2 MG tablet                Primary Care Provider Office Phone # Fax #    Dereck Lomeli [phone]       [address] [facility] [address]        Equal Access to Services     TREVA [facility] CenterJULIA : Hadii lynda kim hadasho Sorenee, waaxda luqadaha, qaybta kaalmada adeaideyachristi, wayne potter . So [facility] [phone].    ATENCIÓN: Si habla español, tiene a young disposición servicios gratuitos de asistencia lingüística. [facility] [phone].    We comply with applicable federal civil rights laws and [place] laws. We do not discriminate on the basis of race, color, national origin, age, disability, sex, sexual orientation, or gender identity.            Thank you!     Thank you for choosing [facility] [place]  for your care. Our goal is always to provide you with excellent care. Hearing back from our patients is one way we can continue to improve our services. Please take a few minutes to complete the written survey that you may receive in the mail after your visit with us. Thank you!             Your Updated Medication List - Protect others around you: Learn how to safely use, store and throw away your medicines at www.disposemymeds.org.          This list is accurate as of 12/5/18 10:45 AM.  Always use your most recent med  list.                   Brand Name Dispense Instructions for use Diagnosis    aspirin 81 MG tablet    ASA    100    1 tab po QD (Once per day)        atorvastatin 10 MG tablet    LIPITOR    90 tablet    Take 1 tablet (10 mg) by mouth At Bedtime    Hyperlipidemia LDL goal <100, Abnormal cardiac CT angiography       co-enzyme Q-10 50 MG Caps      Take 100 mg by mouth        * dutasteride 0.5 MG capsule    AVODART    90 capsule    Take 1 capsule (0.5 mg) by mouth daily    BPH (benign prostatic hyperplasia)       * dutasteride 0.5 MG capsule    AVODART    90 capsule    Take 1 capsule (0.5 mg) by mouth daily    Family history of prostate cancer       ketoconazole 2 % external cream    NIZORAL          metroNIDAZOLE 0.75 % external cream    METROCREAM          MULTIVITAMIN TABS   OR      1 tab qd        sildenafil 100 MG tablet    VIAGRA    24 tablet    Take  by mouth daily as needed for erectile dysfunction. 0.5 to 1    Impotence of organic origin       * tolterodine ER 4 MG 24 hr capsule    DETROL LA          * tolterodine 2 MG tablet    DETROL    180 tablet    Take 1 tablet (2 mg) by mouth 2 times daily    Urge incontinence of urine       zolpidem ER 6.25 MG CR tablet    AMBIEN CR    30 tablet    TAKE ONE TABLET BY MOUTH ONCE DAILY IN THE EVENING AS NEEDED FOR SLEEP    Chronic insomnia       * Notice:  This list has 4 medication(s) that are the same as other medications prescribed for you. Read the directions carefully, and ask your doctor or other care provider to review them with you.

## 2019-03-13 ENCOUNTER — TRANSFERRED RECORDS (OUTPATIENT)
Dept: HEALTH INFORMATION MANAGEMENT | Facility: CLINIC | Age: 79
End: 2019-03-13

## 2019-04-15 DIAGNOSIS — F51.04 CHRONIC INSOMNIA: ICD-10-CM

## 2019-04-16 NOTE — TELEPHONE ENCOUNTER
Requested Prescriptions   Pending Prescriptions Disp Refills     zolpidem ER (AMBIEN CR) 6.25 MG CR tablet [Pharmacy Med Name: ZOLPIDEM TAR ER 6.25MG TAB]        Last Written Prescription Date:  11/16/2018  Last Fill Quantity: 30,   # refills: 05  Last Office Visit: 11/16/2018  Future Office visit:       Routing refill request to provider for review/approval because:  Drug not on the [facility], P or  Health refill protocol or controlled substance   30 tablet 5     Sig: TAKE 1 TABLET BY MOUTH ONCE DAILY IN THE EVENING AS NEEDED FOR SLEEP       There is no refill protocol information for this order

## 2019-04-29 RX ORDER — ZOLPIDEM TARTRATE 6.25 MG/1
TABLET, FILM COATED, EXTENDED RELEASE ORAL
Qty: 30 TABLET | Refills: 5 | Status: SHIPPED | OUTPATIENT
Start: 2019-04-29 | End: 2019-10-26

## 2019-04-29 NOTE — TELEPHONE ENCOUNTER
rx zolpidem ambien er cr 6.25 mg cr tablet [phone]

## 2019-04-29 NOTE — TELEPHONE ENCOUNTER
Tried to call patient to otify RX sent but brianna just rang and went to busy signal. JUANI Daniels, CMA

## 2019-06-26 ENCOUNTER — TRANSFERRED RECORDS (OUTPATIENT)
Dept: HEALTH INFORMATION MANAGEMENT | Facility: CLINIC | Age: 79
End: 2019-06-26

## 2019-08-07 ENCOUNTER — TRANSFERRED RECORDS (OUTPATIENT)
Dept: HEALTH INFORMATION MANAGEMENT | Facility: CLINIC | Age: 79
End: 2019-08-07

## 2019-08-16 ENCOUNTER — OFFICE VISIT (OUTPATIENT)
Dept: INTERNAL MEDICINE | Facility: CLINIC | Age: 79
End: 2019-08-16
Payer: MEDICARE

## 2019-08-16 VITALS
DIASTOLIC BLOOD PRESSURE: 72 MMHG | RESPIRATION RATE: 16 BRPM | WEIGHT: 180.5 LBS | OXYGEN SATURATION: 98 % | HEART RATE: 78 BPM | TEMPERATURE: 98.6 F | SYSTOLIC BLOOD PRESSURE: 130 MMHG | BODY MASS INDEX: 23.81 KG/M2

## 2019-08-16 DIAGNOSIS — G20.A1 PARKINSON'S DISEASE (H): ICD-10-CM

## 2019-08-16 DIAGNOSIS — R35.0 FREQUENCY OF URINATION: ICD-10-CM

## 2019-08-16 DIAGNOSIS — J02.9 SORE THROAT: ICD-10-CM

## 2019-08-16 DIAGNOSIS — R53.83 FATIGUE, UNSPECIFIED TYPE: ICD-10-CM

## 2019-08-16 DIAGNOSIS — B34.9 VIRAL SYNDROME: Primary | ICD-10-CM

## 2019-08-16 LAB
ALBUMIN UR-MCNC: NEGATIVE MG/DL
APPEARANCE UR: CLEAR
BASOPHILS # BLD AUTO: 0 10E9/L (ref 0–0.2)
BASOPHILS NFR BLD AUTO: 0.4 %
BILIRUB UR QL STRIP: NEGATIVE
COLOR UR AUTO: YELLOW
DIFFERENTIAL METHOD BLD: NORMAL
EOSINOPHIL # BLD AUTO: 0.2 10E9/L (ref 0–0.7)
EOSINOPHIL NFR BLD AUTO: 3.2 %
GLUCOSE UR STRIP-MCNC: NEGATIVE MG/DL
HGB UR QL STRIP: NEGATIVE
KETONES UR STRIP-MCNC: NEGATIVE MG/DL
LEUKOCYTE ESTERASE UR QL STRIP: NEGATIVE
LYMPHOCYTES # BLD AUTO: 2.1 10E9/L (ref 0.8–5.3)
LYMPHOCYTES NFR BLD AUTO: 27.3 %
MONOCYTES # BLD AUTO: 0.5 10E9/L (ref 0–1.3)
MONOCYTES NFR BLD AUTO: 6.9 %
NEUTROPHILS # BLD AUTO: 4.7 10E9/L (ref 1.6–8.3)
NEUTROPHILS NFR BLD AUTO: 62.2 %
NITRATE UR QL: NEGATIVE
PH UR STRIP: 7 PH (ref 5–7)
RBC #/AREA URNS AUTO: NORMAL /HPF
SOURCE: NORMAL
SP GR UR STRIP: <=1.005 (ref 1–1.03)
TSH SERPL DL<=0.005 MIU/L-ACNC: 2.07 MU/L (ref 0.4–4)
UROBILINOGEN UR STRIP-ACNC: 0.2 EU/DL (ref 0.2–1)
WBC # BLD AUTO: 7.5 10E9/L (ref 4–11)
WBC #/AREA URNS AUTO: NORMAL /HPF

## 2019-08-16 PROCEDURE — 36415 COLL VENOUS BLD VENIPUNCTURE: CPT | Performed by: INTERNAL MEDICINE

## 2019-08-16 PROCEDURE — 85004 AUTOMATED DIFF WBC COUNT: CPT | Performed by: INTERNAL MEDICINE

## 2019-08-16 PROCEDURE — 99213 OFFICE O/P EST LOW 20 MIN: CPT | Performed by: INTERNAL MEDICINE

## 2019-08-16 PROCEDURE — 85048 AUTOMATED LEUKOCYTE COUNT: CPT | Performed by: INTERNAL MEDICINE

## 2019-08-16 PROCEDURE — 81001 URINALYSIS AUTO W/SCOPE: CPT | Performed by: INTERNAL MEDICINE

## 2019-08-16 PROCEDURE — 84443 ASSAY THYROID STIM HORMONE: CPT | Performed by: INTERNAL MEDICINE

## 2019-08-16 NOTE — PROGRESS NOTES
Subjective     Dylan Davila is a 78 year old male who presents to clinic today for the following health issues:    HPI   Sore throat early on (improved now), runny nose shivering chills, difficulty sleeping (it wakes him up) -      Duration: since Tues , 08/13/19    Description  nasal congestion on right side, rhinorrhea, sore throat, cough (mild), chills, fatigue/malaise and hoarse voice.  Keeping hiim awake.   Hasn't measured temp    Severity: moderate    Accompanying signs and symptoms: None    History (predisposing factors):  none    Precipitating or alleviating factors: None - Has Parkinsons    Therapies tried and outcome:  rest and fluids, no analgesics    Problem list and histories reviewed & adjusted, as indicated.  Additional history: as documented    Additional issues to address:  1.  Currently being evaluated by GI for constipation problems.   Has lost 20+ lbs since January, not drying.   Colonoscopy being planned.   BMP and CBC last week normal.    2.  Dr. Jose Juan Vela manages parkinsons.   Seen in June     ROS:  Urinary urgency without dysuria.   Frequency.  Using detrol, unclear if helps.   Constitutional, HEENT, cardiovascular, pulmonary, gi and gu systems are negative, except as otherwise noted.    OBJECTIVE:                                                    /72   Pulse 78   Temp 98.6  F (37  C) (Oral)   Resp 16   Wt 81.9 kg (180 lb 8 oz)   SpO2 98%   BMI 23.81 kg/m    Body mass index is 23.81 kg/m .   GENERAL APPEARANCE: alert and no distress  HENT: ear canals and TM's normal and nose and mouth without ulcers or lesions.   Modest erythema posterior oropharynx, no exudate  NECK: no adenopathy, no asymmetry, masses, or scars and thyroid normal to palpation  RESP: lungs clear to auscultation - no rales, rhonchi or wheezes  CV: normal heart tones  SKIN: warm, dry  NEURO: parkinsonian features are present     CBC, TSH normal  UA negative        ASSESSMENT:                                                       1.  URI, symptoms most likely viral  2.  Fatigue, likely from Parkinsons  3.  Parkinsons, patient seems appropriate for trial of sinemet, as his constipation problem now appears resolved on treatment     PLAN:                                                      1.  MEDICATIONS:        - Continue other medications without change       - OK to consider Sinemet  2.  Check thyroid function --> normal    3.  Follow-up with Dr. Nevarez in October, need fasting labs then    Parag Wadsworth MD  [facility]

## 2019-08-16 NOTE — PATIENT INSTRUCTIONS
PLAN:                                                      1.  MEDICATIONS:        - Continue other medications without change       - OK to consider Sinemet  2.  Rest and Fluids   3.  Follow-up with Dr. Nevarez in October, need fasting labs then

## 2019-08-29 ENCOUNTER — TRANSFERRED RECORDS (OUTPATIENT)
Dept: HEALTH INFORMATION MANAGEMENT | Facility: CLINIC | Age: 79
End: 2019-08-29

## 2019-09-14 ENCOUNTER — APPOINTMENT (OUTPATIENT)
Dept: CT IMAGING | Facility: CLINIC | Age: 79
End: 2019-09-14
Attending: EMERGENCY MEDICINE
Payer: MEDICARE

## 2019-09-14 ENCOUNTER — HOSPITAL ENCOUNTER (EMERGENCY)
Facility: CLINIC | Age: 79
Discharge: HOME | End: 2019-09-14
Attending: EMERGENCY MEDICINE | Admitting: EMERGENCY MEDICINE
Payer: MEDICARE

## 2019-09-14 ENCOUNTER — APPOINTMENT (OUTPATIENT)
Dept: GENERAL RADIOLOGY | Facility: CLINIC | Age: 79
End: 2019-09-14
Attending: EMERGENCY MEDICINE
Payer: MEDICARE

## 2019-09-14 VITALS
SYSTOLIC BLOOD PRESSURE: 135 MMHG | HEART RATE: 64 BPM | WEIGHT: 175 LBS | RESPIRATION RATE: 12 BRPM | BODY MASS INDEX: 22.46 KG/M2 | OXYGEN SATURATION: 98 % | HEIGHT: 74 IN | TEMPERATURE: 97.7 F | DIASTOLIC BLOOD PRESSURE: 73 MMHG

## 2019-09-14 DIAGNOSIS — S70.02XA CONTUSION OF LEFT HIP, INITIAL ENCOUNTER: ICD-10-CM

## 2019-09-14 DIAGNOSIS — R55 NEAR SYNCOPE: ICD-10-CM

## 2019-09-14 DIAGNOSIS — S09.90XA CLOSED HEAD INJURY, INITIAL ENCOUNTER: ICD-10-CM

## 2019-09-14 LAB
ANION GAP SERPL CALCULATED.3IONS-SCNC: 4 MMOL/L (ref 3–14)
BASOPHILS # BLD AUTO: 0 10E9/L (ref 0–0.2)
BASOPHILS NFR BLD AUTO: 0.3 %
BUN SERPL-MCNC: 25 MG/DL (ref 7–30)
CALCIUM SERPL-MCNC: 9.2 MG/DL (ref 8.5–10.1)
CHLORIDE SERPL-SCNC: 105 MMOL/L (ref 94–109)
CO2 SERPL-SCNC: 30 MMOL/L (ref 20–32)
CREAT SERPL-MCNC: 1.1 MG/DL (ref 0.66–1.25)
DIFFERENTIAL METHOD BLD: NORMAL
EOSINOPHIL # BLD AUTO: 0.1 10E9/L (ref 0–0.7)
EOSINOPHIL NFR BLD AUTO: 1.8 %
ERYTHROCYTE [DISTWIDTH] IN BLOOD BY AUTOMATED COUNT: 13.1 % (ref 10–15)
GFR SERPL CREATININE-BSD FRML MDRD: 64 ML/MIN/{1.73_M2}
GLUCOSE SERPL-MCNC: 102 MG/DL (ref 70–99)
HCT VFR BLD AUTO: 41.8 % (ref 40–53)
HGB BLD-MCNC: 14.3 G/DL (ref 13.3–17.7)
IMM GRANULOCYTES # BLD: 0 10E9/L (ref 0–0.4)
IMM GRANULOCYTES NFR BLD: 0.2 %
INTERPRETATION ECG - MUSE: NORMAL
LYMPHOCYTES # BLD AUTO: 2.2 10E9/L (ref 0.8–5.3)
LYMPHOCYTES NFR BLD AUTO: 35.5 %
MCH RBC QN AUTO: 30.4 PG (ref 26.5–33)
MCHC RBC AUTO-ENTMCNC: 34.2 G/DL (ref 31.5–36.5)
MCV RBC AUTO: 89 FL (ref 78–100)
MONOCYTES # BLD AUTO: 0.5 10E9/L (ref 0–1.3)
MONOCYTES NFR BLD AUTO: 7.6 %
NEUTROPHILS # BLD AUTO: 3.4 10E9/L (ref 1.6–8.3)
NEUTROPHILS NFR BLD AUTO: 54.6 %
PLATELET # BLD AUTO: 169 10E9/L (ref 150–450)
POTASSIUM SERPL-SCNC: 4 MMOL/L (ref 3.4–5.3)
RBC # BLD AUTO: 4.71 10E12/L (ref 4.4–5.9)
SODIUM SERPL-SCNC: 139 MMOL/L (ref 133–144)
TROPONIN I SERPL-MCNC: <0.015 UG/L (ref 0–0.04)
WBC # BLD AUTO: 6.2 10E9/L (ref 4–11)

## 2019-09-14 PROCEDURE — 93005 ELECTROCARDIOGRAM TRACING: CPT

## 2019-09-14 PROCEDURE — 73502 X-RAY EXAM HIP UNI 2-3 VIEWS: CPT

## 2019-09-14 PROCEDURE — 85025 COMPLETE CBC W/AUTO DIFF WBC: CPT | Performed by: EMERGENCY MEDICINE

## 2019-09-14 PROCEDURE — 70450 CT HEAD/BRAIN W/O DYE: CPT

## 2019-09-14 PROCEDURE — 99285 EMERGENCY DEPT VISIT HI MDM: CPT | Mod: 25

## 2019-09-14 PROCEDURE — 84484 ASSAY OF TROPONIN QUANT: CPT | Performed by: EMERGENCY MEDICINE

## 2019-09-14 PROCEDURE — 80048 BASIC METABOLIC PNL TOTAL CA: CPT | Performed by: EMERGENCY MEDICINE

## 2019-09-14 ASSESSMENT — ENCOUNTER SYMPTOMS
BACK PAIN: 0
FEVER: 0
VOMITING: 0
NECK PAIN: 0
CONSTIPATION: 1
DIARRHEA: 0
COUGH: 0
LIGHT-HEADEDNESS: 1
ARTHRALGIAS: 1

## 2019-09-14 ASSESSMENT — MIFFLIN-ST. JEOR: SCORE: 1588.54

## 2019-09-14 NOTE — ED PROVIDER NOTES
History     Chief Complaint:  Fall      HPI   Dylan Davila is a 78 year old male with a history of Parkinson's, who presents with left hip pain after falling and hitting his head 1 hour ago at his home. The patient reports that he was standing from a seated position, as he had fallen asleep in the chair,  when he became light headed and had a near syncopal event for a few seconds, causing him to fall backwards and hit his head on the wooden frame of his rocking chair and left hip on a cement slab of his fireplace. He reports that he was aware when he was falling backwards. He endorses that he was ambulatory after the fall. He describes that he was unable break his fall with his arms. He denies vomiting or feeling nauseous after his fall and states he did not obtain any laceration to his head after the fall. He endorses constipation, though notes this is chronic. He reports taking his Ambien at 9:30 PM tonight. He denies recent illness, fever, vomiting, diarrhea, neck pain, back pain, wrist pain, rib pain, pelvic pain, or dental pain. He denies any change in medication or history of frequent falls or use of a walker.     Allergies:  Calcium  Codeine  Flomax   Azithromycin    Medications:    Aspirin  Lipitor  Avodart  Detrol  Ambien   Viagra    Past Medical History:    BCC  Benign localized hyperplasia of prostate with urinary obstruction   Degeneration of lumbar of lumbosacral intervertebral disc  Impotence of organic origin  Malignant melanoma  Osteopenia  HLD  Palpitations  Parkinson's  Pneumonia  Prostatitis  Resting tremor  Sensorineural hearing loss  Insomnia  Chronic constipation  CAD    Past Surgical History:    Vasectomy  Hemorrhoidectomy  Right inguinal herniorrhaphy  T&A  Excision malignant melanoma  Cornea lens implant   Sigmoidoscopy diagnostic    Family History:    Prostate cancer  CHF    Social History:  Smoking status: former   Alcohol use: yes   Drug use: no   PCP: Dereck Coronel  "Status:        Review of Systems   Constitutional: Negative for fever.   HENT: Negative for dental problem.    Respiratory: Negative for cough.    Gastrointestinal: Positive for constipation (chronic). Negative for diarrhea and vomiting.   Musculoskeletal: Positive for arthralgias. Negative for back pain and neck pain.   Neurological: Positive for light-headedness. Negative for syncope.   All other systems reviewed and are negative.      Physical Exam     Patient Vitals for the past 24 hrs:   BP Temp Temp src Pulse Resp SpO2 Height Weight   09/14/19 0400 135/73 -- -- 64 -- 98 % -- --   09/14/19 0225 (!) 144/77 97.7  F (36.5  C) Oral 81 12 99 % 1.88 m (6' 2\") 79.4 kg (175 lb)        Physical Exam    Physical Exam   Constitutional:  Patient is oriented to person, place, and time. They appear well-developed and well-nourished. Mild distress secondary to falling.   HENT:      Head:   Hematoma on the left black side of his head without bleeding.   Mouth/Throat:   Oropharynx is clear and moist. No malocclusion or intraoral injury.  Eyes:    Conjunctivae normal and EOM are normal. Pupils are equal, round, and reactive to light.   Neck:    Normal range of motion. no focal vertebral tenderness to palpation.   Cardiovascular: Normal rate, regular rhythm and normal heart sounds.  Exam reveals no gallop and no friction rub.  No murmur heard.  Pulmonary/Chest:  Effort normal and breath sounds normal. Patient has no wheezes. Patient has no rales. No rib pain to palpation.  Abdominal:   Soft. Bowel sounds are normal. Patient exhibits no mass. There is no tenderness. There is no rebound and no guarding.   Musculoskeletal:  Patient has some mild rigidity to his upper extremities consistent with his parkinson's. No pain at any of the upper extremity joints. Patient indicates pain over the left greater trochanter to palpation. No signs of bruising or abrasions. He is able to flex and extend the hip against resistance. He is " able to tolerate log roll. Right lower extremity is normal.   Neurological:   Patient is alert and oriented to person, place, and time. Patient has normal strength. No cranial nerve deficit or sensory deficit. GCS 15  Skin:   Skin is warm and dry. No rash noted. No erythema.   Psychiatric:   Patient has a normal mood and flat affect. Patient's behavior is normal.     Emergency Department Course   ECG:  ECG (3:49:00):  Rate 64 bpm. AZ interval 156. QRS duration 90. QT/QTc 398/410. P-R-T axes 86 15 52. Normal sinus rhythm. Normal ECG. Agree with computer interpretation. No significant change compared to EKG dated 12/4/12.  Interpreted at 0350 by Kathy Salamanca MD.     Imaging:  Radiographic findings were communicated with the patient who voiced understanding of the findings.    XR Pelvis w Hip Left 1 view  1. No visualized acute fracture or malalignment of the pelvis or left  hip.  2. Mild degenerative disease in bilateral hip joints.  As read by Radiology.    Head CT w/o Contrast  1.  Normal head CT.  As read by Radiology.    Laboratory:  BMP: Glucose 102 (H), o/w WNL (Creatinine: 1.10)  CBC: WBC 6.2, HGB 14.3,    Troponin 1 (0246): <0.015    Emergency Department Course:  0228: Nursing notes and vitals reviewed. I performed an exam of the patient as documented above.     Blood drawn. This was sent to the lab for further testing, results above.    The patient was sent for a head CT and hip xray while in the emergency department, findings above.     0419: I rechecked the patient and discussed the results of his workup thus far.     Findings and plan explained to the Patient. Patient discharged home with instructions regarding supportive care, medications, and reasons to return. The importance of close follow-up was reviewed.    I personally reviewed the laboratory results with the Patient and answered all related questions prior to discharge.     Impression & Plan      Medical Decision Making:  Dylan  Ghulam Davila is a 78 year old male who presents with what sounds like near syncope secondary to ortho stasis when he stood up from a chair in the middle of the night. It sounds like he had near syncope and fell onto his left side. There was no LOC and he was ambulatory afterwards. He did not have any focal neurologic deficits on my exam. No evidence of scalp bleeding. CT scan of his head and xray of his hip and pelvis were performed. fortunately there was no evidence of skull fracture, intracranial bleeding, or hip or pelvic fracture. At this time, he has a closed head injury and a hip contusion. Discussed the symptomatic cares with him and his wife, specifically ice, ibuprofen, and Tylenol. Regarding his syncope episode, I suspect this was standing up too quickly in the middle of the night. His EKG shows no abnormality. Basic blood work does not show any concerning cause either. At this time I feel he is safe for discharge. I will have him follow up with his primary care doctor in one week.      Critical Care time:  none    Diagnosis:    ICD-10-CM    1. Closed head injury, initial encounter S09.90XA    2. Contusion of left hip, initial encounter S70.02XA    3. Near syncope R55        Disposition:  discharged to home    IAggie, am serving as a scribe at 2:28 AM on 9/14/2019 to document services personally performed by Kathy Salamanca MD based on my observations and the provider's statements to me.       Aggie Georges  9/14/2019    EMERGENCY DEPARTMENT       Kathy Salamanca MD  09/14/19 0511

## 2019-09-14 NOTE — ED TRIAGE NOTES
Patient states standing up from chair.  Thinks he may have blacked out when he was standing up.  Fell backwards.  Hit head on wood frame of rocking chair.  Hit left thigh, buttock area on fireplace cement slab.

## 2019-09-14 NOTE — ED AVS SNAPSHOT
Emergency Department  [address] [place] [address]  Phone:  [phone]  Fax:  [phone]                                    Dylan Davila   MRN: [id number]    Department:   Emergency Department   Date of Visit:  9/14/2019           After Visit Summary Signature Page    I have received my discharge instructions, and my questions have been answered. I have discussed any challenges I see with this plan with the nurse or doctor.    ..........................................................................................................................................  Patient/Patient Representative Signature      ..........................................................................................................................................  Patient Representative Print Name and Relationship to Patient    ..................................................               ................................................  Date                                   Time    ..........................................................................................................................................  Reviewed by Signature/Title    ...................................................              ..............................................  Date                                               Time          22EPIC Rev 08/18

## 2019-10-01 ENCOUNTER — HEALTH MAINTENANCE LETTER (OUTPATIENT)
Age: 79
End: 2019-10-01

## 2019-10-21 ENCOUNTER — TRANSFERRED RECORDS (OUTPATIENT)
Dept: HEALTH INFORMATION MANAGEMENT | Facility: CLINIC | Age: 79
End: 2019-10-21

## 2019-10-21 DIAGNOSIS — F51.04 CHRONIC INSOMNIA: ICD-10-CM

## 2019-10-22 NOTE — TELEPHONE ENCOUNTER
Requested Prescriptions   Pending Prescriptions Disp Refills     zolpidem ER (AMBIEN CR) 6.25 MG CR tablet [Pharmacy Med Name: ZOLPIDEM TAR ER 6.25MG TAB] 30 tablet 5     Sig: TAKE 1 TABLET BY MOUTH ONCE DAILY IN THE EVENING AS NEEDED FOR SLEEP       There is no refill protocol information for this order        Last Written Prescription Date:  04/29/2019  Last Fill Quantity: 30,  # refills: 5   Last office visit: 8/16/2019 with prescribing provider:  Dr. Lomeli   Future Office Visit:   Next 5 appointments (look out 90 days)    Oct 30, 2019  8:20 AM CDT  PHYSICAL with Dereck Lomeli MD  [facility] ([facility] [address] [place] [address] [phone]

## 2019-10-23 RX ORDER — ZOLPIDEM TARTRATE 6.25 MG/1
TABLET, FILM COATED, EXTENDED RELEASE ORAL
Qty: 30 TABLET | Refills: 5 | OUTPATIENT
Start: 2019-10-23

## 2019-10-26 DIAGNOSIS — E78.5 HYPERLIPIDEMIA LDL GOAL <100: ICD-10-CM

## 2019-10-26 DIAGNOSIS — F51.04 CHRONIC INSOMNIA: ICD-10-CM

## 2019-10-26 DIAGNOSIS — R93.1 ABNORMAL CARDIAC CT ANGIOGRAPHY: ICD-10-CM

## 2019-10-26 NOTE — LETTER
[facility]  [address] [id number] [phone]            Dylan Davila  [address] [facility] [address]        October 28, 2019    Dear Ghulam,    While refilling your prescription today, we noticed that you are due to have labs drawn.  We will refill your prescription for 30 days, but a follow-up appointment must be made before any additional refills can be approved.     Taking care of your health is important to us and we look forward to seeing you in the near future.  Please call us at [phone] or [phone] (or use [phone]) to schedule an appointment.     Please disregard this notice if you have already made an appointment.    Sincerely,        [facility]

## 2019-10-28 RX ORDER — ATORVASTATIN CALCIUM 10 MG/1
TABLET, FILM COATED ORAL
Qty: 30 TABLET | Refills: 0 | Status: SHIPPED | OUTPATIENT
Start: 2019-10-28 | End: 2019-10-30

## 2019-10-28 NOTE — TELEPHONE ENCOUNTER
History:    Does Patient meet criteria for [employer] Schedule Optimization:  Yes  Is Patient interested in Schedule Optimization:  Undecided in participating      Patient age <15 or >35: Yes- pt is age 41  Toxoplasmosis: No- pt is aware not to change the cat litter  Cytomegalovirus: No  Hepatitis B  Immunization: Yes    History of any Infertility: No  History of an abnormal pap smear: Yes- had a colposcopy done approx 2014  Any personal History of Post Partum Depression: No  History of previous STD's:  Denies all sexually transmitted disease  History of Blood Transfusion: No  Agree to be tested for AIDS/HIV: Yes  Previous History of Group B Streptococcus: No  Previous History of Group B Strep Infected Infant: No  Problems with Anesthesia: No  Any History of Hemorrhage during a Previous Pregnancy: No  Family or Medical History of Twins or Multiple Birth: No  Desires testing for Cystic Fibrosis: No  Reviewed information about genetic testing options (Rule out Down Syndrome, T18/T13):Yes- pt's 's cousin's son had down syndrome. Pt is unsure if desires genetic testing.  Made aware to discuss testing with Primary physician at first appointment: Yes  Travel outside [facility] within last 6 months: No  Where:  Dates:  Patient, your partner, or both:   Did Patient report symptoms of Zika infection within 2 weeks of visit:  n/a    Psychosocial Assessment:    Concerns with Housing, Clothing, Food: No  Concerns with Finances: No  Concerns with Transportation: No  Biological Father Involved: Yes  History of Physical/Sexual/Emotional Abuse: No    Visit Summary:     Kevin () is the father and he will be involved. Please see prenatal checklist for areas of education covered today. Questions answered. Pt denies any additional concerns or questions at this time.       G- 2 P-[id number].   Had  D&C due to miscarriage in 2017.  Pt accepts HCG level to determine how soon to come in for viability ultrasound. Pt will go to  Medication is being filled for 1 time refill only due to:  Patient needs labs lipids.     [place] lab Monday.  OB 1 appt set for March 28 at 1:20 pm.  Pt accepts HIV test.

## 2019-10-30 ENCOUNTER — HEALTH MAINTENANCE LETTER (OUTPATIENT)
Age: 79
End: 2019-10-30

## 2019-10-30 ENCOUNTER — OFFICE VISIT (OUTPATIENT)
Dept: INTERNAL MEDICINE | Facility: CLINIC | Age: 79
End: 2019-10-30
Payer: MEDICARE

## 2019-10-30 VITALS
OXYGEN SATURATION: 98 % | TEMPERATURE: 97.5 F | BODY MASS INDEX: 23.08 KG/M2 | SYSTOLIC BLOOD PRESSURE: 130 MMHG | HEART RATE: 89 BPM | DIASTOLIC BLOOD PRESSURE: 76 MMHG | WEIGHT: 179.8 LBS | HEIGHT: 74 IN

## 2019-10-30 DIAGNOSIS — F13.20 SEDATIVE, HYPNOTIC OR ANXIOLYTIC DEPENDENCE (H): ICD-10-CM

## 2019-10-30 DIAGNOSIS — E78.5 HYPERLIPIDEMIA LDL GOAL <100: ICD-10-CM

## 2019-10-30 DIAGNOSIS — G20.A1 PARKINSON'S DISEASE (H): ICD-10-CM

## 2019-10-30 DIAGNOSIS — R93.1 ABNORMAL CARDIAC CT ANGIOGRAPHY: ICD-10-CM

## 2019-10-30 DIAGNOSIS — Z13.1 SCREENING FOR DIABETES MELLITUS: ICD-10-CM

## 2019-10-30 DIAGNOSIS — F51.04 CHRONIC INSOMNIA: ICD-10-CM

## 2019-10-30 DIAGNOSIS — Z00.00 ENCOUNTER FOR MEDICARE ANNUAL WELLNESS EXAM: Primary | ICD-10-CM

## 2019-10-30 LAB
CHOLEST SERPL-MCNC: 143 MG/DL
GLUCOSE SERPL-MCNC: 90 MG/DL (ref 70–99)
HDLC SERPL-MCNC: 57 MG/DL
LDLC SERPL CALC-MCNC: 66 MG/DL
NONHDLC SERPL-MCNC: 86 MG/DL
TRIGL SERPL-MCNC: 99 MG/DL

## 2019-10-30 PROCEDURE — 36415 COLL VENOUS BLD VENIPUNCTURE: CPT | Performed by: INTERNAL MEDICINE

## 2019-10-30 PROCEDURE — 80061 LIPID PANEL: CPT | Performed by: INTERNAL MEDICINE

## 2019-10-30 PROCEDURE — G0439 PPPS, SUBSEQ VISIT: HCPCS | Performed by: INTERNAL MEDICINE

## 2019-10-30 PROCEDURE — 82947 ASSAY GLUCOSE BLOOD QUANT: CPT | Performed by: INTERNAL MEDICINE

## 2019-10-30 RX ORDER — CARBIDOPA AND LEVODOPA 25; 100 MG/1; MG/1
2.5 TABLET ORAL 3 TIMES DAILY
Refills: 6 | COMMUNITY
Start: 2019-09-28

## 2019-10-30 RX ORDER — ATORVASTATIN CALCIUM 10 MG/1
TABLET, FILM COATED ORAL
Qty: 90 TABLET | Refills: 3 | Status: SHIPPED | OUTPATIENT
Start: 2019-10-30 | End: 2020-11-25

## 2019-10-30 RX ORDER — ZOLPIDEM TARTRATE 6.25 MG/1
TABLET, FILM COATED, EXTENDED RELEASE ORAL
Qty: 30 TABLET | Refills: 5 | Status: SHIPPED | OUTPATIENT
Start: 2019-10-30 | End: 2020-04-23

## 2019-10-30 ASSESSMENT — ACTIVITIES OF DAILY LIVING (ADL): CURRENT_FUNCTION: NO ASSISTANCE NEEDED

## 2019-10-30 ASSESSMENT — MIFFLIN-ST. JEOR: SCORE: 1610.32

## 2019-10-30 NOTE — NURSING NOTE
"Chief Complaint   Patient presents with     Physical     BP (!) 124/94   Pulse 89   Temp 97.5  F (36.4  C) (Temporal)   Ht 1.88 m (6' 2\")   Wt 81.6 kg (179 lb 12.8 oz)   SpO2 98%   BMI 23.08 kg/m   Estimated body mass index is 23.08 kg/m  as calculated from the following:    Height as of this encounter: 1.88 m (6' 2\").    Weight as of this encounter: 81.6 kg (179 lb 12.8 oz).        Health Maintenance due pending provider review:  NONE    n/a    Su Dudley CMA  "

## 2019-10-30 NOTE — PATIENT INSTRUCTIONS
Patient Education   Personalized Prevention Plan  You are due for the preventive services outlined below.  Your care team is available to assist you in scheduling these services.  If you have already completed any of these items, please share that information with your care team to update in your medical record.  Health Maintenance Due   Topic Date Due     Zoster (Shingles) Vaccine (2 of 3) 11/26/2009     Flu Vaccine (1) 09/01/2019     Cholesterol Lab  10/16/2019     FALL RISK ASSESSMENT  10/16/2019     Annual Wellness Visit  10/16/2019     Your Health Risk Assessment indicates you feel you are not in good health    A healthy lifestyle helps keep the body fit and the mind alert. It helps protect you from disease, helps you fight disease, and helps prevent chronic disease (disease that doesn't go away) from getting worse. This is important as you get older and begin to notice twinges in muscles and joints and a decline in the strength and stamina you once took for granted. A healthy lifestyle includes good healthcare, good nutrition, weight control, recreation, and regular exercise. Avoid harmful substances and do what you can to keep safe. Another part of a healthy lifestyle is stay mentally active and socially involved.    Good healthcare     Have a wellness visit every year.     If you have new symptoms, let us know right away. Don't wait until the next checkup.     Take medicines exactly as prescribed and keep your medicines in a safe place. Tell us if your medicine causes problems.   Healthy diet and weight control     Eat 3 or 4 small, nutritious, low-fat, high-fiber meals a day. Include a variety of fruits, vegetables, and whole-grain foods.     Make sure you get enough calcium in your diet. Calcium, vitamin D, and exercise help prevent osteoporosis (bone thinning).     If you live alone, try eating with others when you can. That way you get a good meal and have company while you eat it.     Try to keep a  healthy weight. If you eat more calories than your body uses for energy, it will be stored as fat and you will gain weight.     Recreation   Recreation is not limited to sports and team events. It includes any activity that provides relaxation, interest, enjoyment, and exercise. Recreation provides an outlet for physical, mental, and social energy. It can give a sense of worth and achievement. It can help you stay healthy.    Mental Exercise and Social Involvement  Mental and emotional health is as important as physical health. Keep in touch with friends and family. Stay as active as possible. Continue to learn and challenge yourself.   Things you can do to stay mentally active are:    Learn something new, like a foreign language or musical instrument.     Play SCRABBLE or do crossword puzzles. If you cannot find people to play these games with you at home, you can play them with others on your computer through the Internet.     Join a games club--anything from card games to chess or checkers or lawn bowling.     Start a new hobby.     Go back to school.     Volunteer.     Read.   Keep up with world events.    Signs of Hearing Loss     Hearing much better with one ear can be a sign of hearing loss.     Hearing loss is a problem shared by many people. In fact, it is one of the most common health conditions, particularly as people age. Most people over age 65 have some hearing loss, and by age 80, almost everyone does. Because hearing loss usually occurs slowly over the years, you may not realize your hearing ability has gotten worse.  Have your hearing checked  Contact your healthcare provider if you:    Have to strain to hear normal conversation    Have to watch other people s faces very carefully to follow what they re saying    Need to ask people to repeat what they ve said    Often misunderstand what people are saying    Turn the volume of the television or radio up so high that others complain    Feel that  people are mumbling when they re talking to you    Find that the effort to hear leaves you feeling tired and irritated    Notice, when using the phone, that you hear better with one ear than the other  Date Last Reviewed: 12/1/2016 2000-2018 [employer]. [address] [place] [id number]. All rights reserved. This information is not intended as a substitute for professional medical care. Always follow your healthcare professional's instructions.          Urinary Incontinence (Male)    Urinary incontinence means not being able to control the release of urine from the bladder.  Causes  Common causes of urinary incontinence in men include:    Infection    Certain medicines    Aging    Poor pelvic muscle tone    Bladder spasms    Obesity    Urinary retention  Nervous system diseases, diabetes, sleep apnea, urinary tract infections, prostate surgery, and pelvic trauma can also cause incontinence. Constipation and smoking have also been identified as risk factors.  Symptoms    Urge incontinence (also called  overactive bladder ) is a sudden urge to urinate even though there may not be much urine in the bladder. The need to urinate often during the night is common. It is due to bladder spasms.    Stress incontinence is involuntary urine leakage that can occur with sneezing, coughing, and other actions that put stress on the bladder.  Treatment  Treatment of urinary incontinence depends on the cause. Infections of the bladder are treated with antibiotics. Urinary retention is treated with a bladder catheter.  Home care  Follow these guidelines when caring for yourself at home:    Don't consume foods and drinks that may irritate the bladder. These include drinks containing alcohol, caffeinate, or carbonation; chocolate; and acidic fruits and juices.    Limit fluid intake to 6 to 8 cups a day.    Lose weight if you are overweight. This will reduce your symptoms.    If needed, wear absorbent pads to  catch urine. Change pads frequently to maintain hygiene and prevent skin and bladder infections.    Bathe daily to maintain good hygiene.    If an antibiotic was prescribed to treat a bladder infection, be sure to take it until finished, even if you are feeling better before then. This is to make sure your infection has cleared.    If a catheter was left in place, it is important to keep bacteria from getting into the collection bag. Don't disconnect the catheter from the collection bag.    Use a leg band to secure the catheter drainage tube, so it does not pull on the catheter. Drain the collection bag when it becomes full using the drain spout at the bottom of the bag. Don't disconnect the bag from the catheter.    Don't pull on or try to remove a catheter. The catheter must be removed by a healthcare provider.  Follow-up care  Follow up with your healthcare provider, or as advised.  When to seek medical advice  Call your healthcare provider right away if any of these occur:    Fever over 100.4 F (38 C), or as directed by your healthcare provider    Bladder pain or fullness    Abdominal swelling, nausea, or vomiting    Back pain    Weakness, dizziness, or fainting    If a catheter was left in place, return if:  ? Catheter falls out  ? Catheter stops draining for 6 hours  Date Last Reviewed: 10/1/2017    [id number] [employer]. [address] [place] [address], [place] [id number]. All rights reserved. This information is not intended as a substitute for professional medical care. Always follow your healthcare professional's instructions.        Your Health Risk Assessment indicates you feel you are not in good emotional health.    Recreation   Recreation is not limited to sports and team events. It includes any activity that provides relaxation, interest, enjoyment, and exercise. Recreation provides an outlet for physical, mental, and social energy. It can give a sense of worth and achievement. It can help you  stay healthy.    Mental Exercise and Social Involvement  Mental and emotional health is as important as physical health. Keep in touch with friends and family. Stay as active as possible. Continue to learn and challenge yourself.   Things you can do to stay mentally active are:    Learn something new, like a foreign language or musical instrument.     Play SCRABBLE or do crossword puzzles. If you cannot find people to play these games with you at home, you can play them with others on your computer through the Internet.     Join a games club--anything from card games to chess or checkers or lawn bowling.     Start a new hobby.     Go back to school.     Volunteer.     Read.   Keep up with world events.    Preventing Falls in the Home  An adult or child can fall for many reasons. If you are an older adult, you may fall because your reaction time slows down. Your muscles and joints may get stiff, weak, or less flexible because of illness, medicines, or a physical condition. These things can also make a child more likely to fall or be injured in a fall.  Other health problems that make falls more likely include:    Arthritis    Dizziness or lightheadedness when you get out of bed (orthostatic hypotension)    History of a stroke    Dizziness    Anemia    Certain medicines taken for mental illness    Problems with balance or gait    History of falls with or without an injury    Changes in vision (vision impairment)    Changes in thinking skills and memory (cognitive impairment)  Injuries from a fall can include broken bones, dislocated joints, and cuts. When these injuries are serious enough, they can make it impossible for you or a child who is injured in a fall to live on his or her own.  Prevention tips  To help prevent falls and fall-related injuries, follow the tips below.   Floors  Make floors safer by doing the following:     Put nonskid pads under area rugs.    Remove throw rugs.    Replace worn floor  coverings.    Tack carpets firmly to each step on carpeted stairs. Put nonskid strips on the edges of uncarpeted stairs.    Keep floors and stairs free of clutter and cords.    Arrange furniture so there are clear pathways.    Clean up any spills right away.    Wear shoes that fit.  Bathrooms    Make bathrooms safer by doing the following:     Install grab bars in the tub or shower.    Apply nonskid strips or put a nonskid rubber mat in the tub or shower.    Sit on a bath chair to bathe.    Use bathmats with nonskid backing.  Lighting and the environment  Improve lighting in your home by doing the following:     Keep a flashlight in each room. Or put a lamp next to the bed within easy reach.    Put nightlights in the bedrooms, hallways, kitchen, and bathrooms.    Make sure all stairways have good lighting.    Take your time when going up and down stairs.    Put handrails on both sides of stairs and in walkways for more support. To prevent injury to your wrist or arm, don t use handrails to pull yourself up.    Install grab bars to pull yourself up.    Move or rearrange items that you use often. This will make them easier to find or reach.    Look at your home to find any safety hazards. Especially look at doorways, walkways, and the driveway. Remove or repair any safety problems that you find.  Date Last Reviewed: 8/1/2016 2000-2018 [employer]. [address] [facility], [place] [address]. All rights reserved. This information is not intended as a substitute for professional medical care. Always follow your healthcare professional's instructions.

## 2019-10-30 NOTE — PROGRESS NOTES
"SUBJECTIVE:   Dylan Davila is a 78 year old male who presents for Preventive Visit.    Are you in the first 12 months of your Medicare coverage?  No    Healthy Habits:     In general, how would you rate your overall health?  Fair    Frequency of exercise:  2-3 days/week    Duration of exercise:  45-60 minutes    Do you usually eat at least 4 servings of fruit and vegetables a day, include whole grains    & fiber and avoid regularly eating high fat or \"junk\" foods?  Yes    Taking medications regularly:  Yes    Medication side effects:  Not applicable    Ability to successfully perform activities of daily living:  No assistance needed    Home Safety:  No safety concerns identified    Hearing Impairment:  Difficulty understanding soft or whispered speech    In the past 6 months, have you been bothered by leaking of urine? Yes    In general, how would you rate your overall mental or emotional health?  Fair      PHQ-2 Total Score: 0    Additional concerns today:  No    Do you feel safe in your environment? YES    Have you ever done Advance Care Planning? (For example, a Health Directive, POLST, or a discussion with a medical provider about your wishes): Yes, patient states has an Advance Care Planning document and will bring a copy to the clinic.      Fall risk  Fallen 2 or more times in the past year?: Yes  Any fall with injury in the past year?: Yes  Timed Up and Go Test (>13.5 is fall risk; contact physician) : 12    Cognitive Screening   1) Repeat 3 items (Leader, Season, Table)    2) Clock draw: NORMAL  3) 3 item recall: Recalls 2 objects   Results: NORMAL clock, 1-2 items recalled: COGNITIVE IMPAIRMENT LESS LIKELY    Mini-CogTM Copyright [employer]. Licensed by the author for use in [facility]; reprinted with permission (brant@.[email or web address]). All rights reserved.      Do you have sleep apnea, excessive snoring or daytime drowsiness?: no    Reviewed and updated as needed this visit by clinical " "staff  Tobacco  Allergies  Meds  Med Hx  Surg Hx  Fam Hx  Soc Hx        Reviewed and updated as needed this visit by Provider        Social History     Tobacco Use     Smoking status: Former Smoker     Years: 3.00     Types: Pipe     Last attempt to quit: 1975     Years since quittin.8     Smokeless tobacco: Never Used   Substance Use Topics     Alcohol use: Yes     Alcohol/week: 0.0 standard drinks     Comment: 1-2 glasses wine socially         Alcohol Use 10/30/2019   Prescreen: >3 drinks/day or >7 drinks/week? No   Prescreen: >3 drinks/day or >7 drinks/week? -   No flowsheet data found.      Current providers sharing in care for this patient include:   Patient Care Team:  eDreck Lomeli MD as PCP - General (Internal Medicine)  Parga Wadsworth MD as Assigned PCP    The following health maintenance items are reviewed in Epic and correct as of today:  Health Maintenance   Topic Date Due     ZOSTER IMMUNIZATION (2 of 3) 2009     INFLUENZA VACCINE (1) 2019     LIPID  10/16/2019     FALL RISK ASSESSMENT  10/16/2019     MEDICARE ANNUAL WELLNESS VISIT  10/16/2019     BMP  2020     ADVANCE CARE PLANNING  10/30/2024     DTAP/TDAP/TD IMMUNIZATION (3 - Td) 10/24/2027     PHQ-2  Completed     PNEUMOCOCCAL IMMUNIZATION 65+ LOW/MEDIUM RISK  Completed     IPV IMMUNIZATION  Aged Out     MENINGITIS IMMUNIZATION  Aged Out     Lab work is in process      Review of Systems  Constitutional, HEENT, cardiovascular, pulmonary, GI, , musculoskeletal, neuro, skin, endocrine and psych systems are negative, except as otherwise noted.    OBJECTIVE:   /76   Pulse 89   Temp 97.5  F (36.4  C) (Temporal)   Ht 1.88 m (6' 2\")   Wt 81.6 kg (179 lb 12.8 oz)   SpO2 98%   BMI 23.08 kg/m   Estimated body mass index is 23.08 kg/m  as calculated from the following:    Height as of this encounter: 1.88 m (6' 2\").    Weight as of this encounter: 81.6 kg (179 lb 12.8 oz).  Physical Exam  GENERAL: alert, " "no distress, frail, elderly male  EYES: Eyes grossly normal to inspection, PERRL and conjunctivae and sclerae normal  HENT: ear canals and TM's normal, nose and mouth without ulcers or lesions  NECK: no adenopathy, no asymmetry, masses, or scars and thyroid normal to palpation  RESP: lungs clear to auscultation - no rales, rhonchi or wheezes  CV: regular rate and rhythm, normal S1 S2, no S3 or S4, no murmur, click or rub, no peripheral edema and peripheral pulses strong  ABDOMEN: soft, nontender, no hepatosplenomegaly, no masses and bowel sounds normal  MS: no gross musculoskeletal defects noted, no edema  SKIN: no suspicious lesions or rashes  NEURO: tremor - resting , sensory exam grossly normal, mentation intact and gait abnormal: slow, shuffling a bit  PSYCH: mentation appears normal, affect normal/bright  LYMPH: no cervical, supraclavicular, axillary, or inguinal adenopathy    Diagnostic Test Results:  pending    ASSESSMENT / PLAN:   1. Encounter for Medicare annual wellness exam  Fall prevention - has gone to PT this year.  parkinsons contributing factor.  O/w uptodate on immuniz, defers shingrix and screening    2. Parkinson's disease (H)  Per neuro. Has started on sinemet- feels this has helped some    3. Chronic insomnia  Zolipidem. Tolerated well. Understands potential risks of use    4. Hyperlipidemia LDL goal <100  - Lipid panel reflex to direct LDL Fasting  - atorvastatin (LIPITOR) 10 MG tablet; TAKE 1 TABLET BY MOUTH ONCE DAILY AT BEDTIME  Dispense: 90 tablet; Refill: 3    5. Screening for diabetes mellitus  - Glucose    6. minimal calcified plaque on CV CT angio  - atorvastatin (LIPITOR) 10 MG tablet; TAKE 1 TABLET BY MOUTH ONCE DAILY AT BEDTIME  Dispense: 90 tablet; Refill: 3      COUNSELING:  Reviewed preventive health counseling, as reflected in patient instructions    Estimated body mass index is 23.08 kg/m  as calculated from the following:    Height as of this encounter: 1.88 m (6' 2\").    " Weight as of this encounter: 81.6 kg (179 lb 12.8 oz).         reports that he quit smoking about 44 years ago. His smoking use included pipe. He quit after 3.00 years of use. He has never used smokeless tobacco.      Appropriate preventive services were discussed with this patient, including applicable screening as appropriate for cardiovascular disease, diabetes, osteopenia/osteoporosis, and glaucoma.  As appropriate for age/gender, discussed screening for colorectal cancer, prostate cancer, breast cancer, and cervical cancer. Checklist reviewing preventive services available has been given to the patient.    Reviewed patients plan of care and provided an AVS. The Basic Care Plan (routine screening as documented in Health Maintenance) for Dylan meets the Care Plan requirement. This Care Plan has been established and reviewed with the Patient.    Counseling Resources:  ATP IV Guidelines  Pooled Cohorts Equation Calculator  Breast Cancer Risk Calculator  FRAX Risk Assessment  ICSI Preventive Guidelines  Dietary Guidelines for Americans, 2010  [employer] MyPlate  ASA Prophylaxis  Lung CA Screening    Dereck Lomeli MD  [facility]    Identified Health Risks:    The patient was provided with suggestions to help him develop a healthy physical lifestyle.  The patient was provided with written information regarding signs of hearing loss.  Information on urinary incontinence and treatment options given to patient.  The patient was provided with suggestions to help him develop a healthy emotional lifestyle.  He is at risk for falling and has been provided with information to reduce the risk of falling at home.

## 2019-11-14 ENCOUNTER — IMMUNIZATION (OUTPATIENT)
Dept: NURSING | Facility: CLINIC | Age: 79
End: 2019-11-14
Payer: MEDICARE

## 2019-11-14 PROCEDURE — G0008 ADMIN INFLUENZA VIRUS VAC: HCPCS

## 2019-11-14 PROCEDURE — 90662 IIV NO PRSV INCREASED AG IM: CPT

## 2019-12-06 DIAGNOSIS — N40.0 BPH (BENIGN PROSTATIC HYPERPLASIA): Primary | ICD-10-CM

## 2019-12-11 ENCOUNTER — OFFICE VISIT (OUTPATIENT)
Dept: UROLOGY | Facility: CLINIC | Age: 79
End: 2019-12-11
Payer: MEDICARE

## 2019-12-11 VITALS
HEIGHT: 74 IN | WEIGHT: 180 LBS | DIASTOLIC BLOOD PRESSURE: 82 MMHG | BODY MASS INDEX: 23.1 KG/M2 | OXYGEN SATURATION: 97 % | SYSTOLIC BLOOD PRESSURE: 148 MMHG | HEART RATE: 88 BPM

## 2019-12-11 DIAGNOSIS — N52.01 ERECTILE DYSFUNCTION DUE TO ARTERIAL INSUFFICIENCY: Primary | ICD-10-CM

## 2019-12-11 DIAGNOSIS — N40.0 BENIGN PROSTATIC HYPERPLASIA WITHOUT LOWER URINARY TRACT SYMPTOMS: ICD-10-CM

## 2019-12-11 DIAGNOSIS — N39.41 URGE INCONTINENCE: ICD-10-CM

## 2019-12-11 DIAGNOSIS — N42.9 DISORDER OF PROSTATE, UNSPECIFIED: ICD-10-CM

## 2019-12-11 DIAGNOSIS — Z80.42 FAMILY HISTORY OF PROSTATE CANCER: ICD-10-CM

## 2019-12-11 LAB
ALBUMIN UR-MCNC: NEGATIVE MG/DL
APPEARANCE UR: CLEAR
BILIRUB UR QL STRIP: NEGATIVE
COLOR UR AUTO: YELLOW
GLUCOSE UR STRIP-MCNC: NEGATIVE MG/DL
HGB UR QL STRIP: ABNORMAL
KETONES UR STRIP-MCNC: NEGATIVE MG/DL
LEUKOCYTE ESTERASE UR QL STRIP: NEGATIVE
NITRATE UR QL: NEGATIVE
PH UR STRIP: 6 PH (ref 5–7)
PSA SERPL-MCNC: 1.7 NG/ML (ref 0–4)
SOURCE: ABNORMAL
SP GR UR STRIP: 1.01 (ref 1–1.03)
UROBILINOGEN UR STRIP-ACNC: 0.2 EU/DL (ref 0.2–1)

## 2019-12-11 PROCEDURE — 84153 ASSAY OF PSA TOTAL: CPT | Performed by: UROLOGY

## 2019-12-11 PROCEDURE — 99213 OFFICE O/P EST LOW 20 MIN: CPT | Performed by: UROLOGY

## 2019-12-11 PROCEDURE — 81003 URINALYSIS AUTO W/O SCOPE: CPT | Performed by: UROLOGY

## 2019-12-11 PROCEDURE — 36415 COLL VENOUS BLD VENIPUNCTURE: CPT | Performed by: UROLOGY

## 2019-12-11 RX ORDER — TOLTERODINE TARTRATE 2 MG/1
2 TABLET, EXTENDED RELEASE ORAL EVERY 12 HOURS
Qty: 180 TABLET | Refills: 3 | Status: SHIPPED | OUTPATIENT
Start: 2019-12-11 | End: 2020-12-31

## 2019-12-11 RX ORDER — DUTASTERIDE 0.5 MG/1
0.5 CAPSULE, LIQUID FILLED ORAL DAILY
Qty: 90 CAPSULE | Refills: 3 | Status: SHIPPED | OUTPATIENT
Start: 2019-12-11 | End: 2020-12-31

## 2019-12-11 RX ORDER — SILDENAFIL 100 MG/1
100 TABLET, FILM COATED ORAL DAILY PRN
Qty: 20 TABLET | Refills: 1 | Status: SHIPPED | OUTPATIENT
Start: 2019-12-11 | End: 2020-12-28

## 2019-12-11 ASSESSMENT — MIFFLIN-ST. JEOR: SCORE: 1606.22

## 2019-12-11 ASSESSMENT — PAIN SCALES - GENERAL: PAINLEVEL: NO PAIN (0)

## 2019-12-11 NOTE — PROGRESS NOTES
Ghulam Davila is a 79-year-old male with Parkinson's disease and a family history of prostate cancer.  He is on Avodart and tolterodine 2 mg twice daily daily.  His PSA has increased to 1.7 this year.  Other past medical history: Basal cell carcinoma, BPH, lumbar disc disease, ED, malignant melanoma, osteopenia, hyperlipidemia, palpitations, history of pneumonia, history of prostatitis, restless legs, resting tremor, hearing loss, former smoker  Surgeries reviewed  Medications: Reviewed  Allergies: Calcium, codeine, Flomax  Review of systems: No dysuria or hematuria  Family history: Brother and father with prostate cancer  Exam: Alert and oriented, more rigidity and slow in his motions.  Normal respirations.  Normal sphincter tone, no rectal mass or impaction, benign and symmetric prostate and normal seminal vesicles.  Assessment: No evidence on rectal exam for prostate cancer.  Change in PSA- discussed evaluation versus observation and recommend observation at this point.  Plan: Renew Avodart, tolterodine, Viagra.  See me in 1 year with PSA and for digital rectal exam

## 2019-12-11 NOTE — LETTER
12/11/2019       RE: Dylan Davila   [address]  [facility] [address]     Dear Colleague,    Thank you for referring your patient, Dylan Davila, to the [facility] [place] at [facility]. Please see a copy of my visit note below.    Ghulam Davila is a 79-year-old male with Parkinson's disease and a family history of prostate cancer.  He is on Avodart and tolterodine 2 mg twice daily daily.  His PSA has increased to 1.7 this year.  Other past medical history: Basal cell carcinoma, BPH, lumbar disc disease, ED, malignant melanoma, osteopenia, hyperlipidemia, palpitations, history of pneumonia, history of prostatitis, restless legs, resting tremor, hearing loss, former smoker  Surgeries reviewed  Medications: Reviewed  Allergies: Calcium, codeine, Flomax  Review of systems: No dysuria or hematuria  Family history: Brother and father with prostate cancer  Exam: Alert and oriented, more rigidity and slow in his motions.  Normal respirations.  Normal sphincter tone, no rectal mass or impaction, benign and symmetric prostate and normal seminal vesicles.  Assessment: No evidence on rectal exam for prostate cancer.  Change in PSA- discussed evaluation versus observation and recommend observation at this point.  Plan: Renew Avodart, tolterodine, Viagra.  See me in 1 year with PSA and for digital rectal exam    Again, thank you for allowing me to participate in the care of your patient.      Sincerely,    Frandy Christiansen MD

## 2020-02-10 ENCOUNTER — TRANSFERRED RECORDS (OUTPATIENT)
Dept: HEALTH INFORMATION MANAGEMENT | Facility: CLINIC | Age: 80
End: 2020-02-10

## 2020-02-13 ENCOUNTER — OFFICE VISIT (OUTPATIENT)
Dept: INTERNAL MEDICINE | Facility: CLINIC | Age: 80
End: 2020-02-13
Payer: MEDICARE

## 2020-02-13 DIAGNOSIS — R03.0 ELEVATED BP WITHOUT DIAGNOSIS OF HYPERTENSION: ICD-10-CM

## 2020-02-13 DIAGNOSIS — R55 SYNCOPE, UNSPECIFIED SYNCOPE TYPE: ICD-10-CM

## 2020-02-13 DIAGNOSIS — G20.A1 PARKINSON'S DISEASE (H): ICD-10-CM

## 2020-02-13 DIAGNOSIS — I10 BAROREFLEX FAILURE: ICD-10-CM

## 2020-02-13 DIAGNOSIS — J31.0 CHRONIC RHINITIS: ICD-10-CM

## 2020-02-13 DIAGNOSIS — I95.1 POSTURAL HYPOTENSION: Primary | ICD-10-CM

## 2020-02-13 PROCEDURE — 99214 OFFICE O/P EST MOD 30 MIN: CPT | Performed by: INTERNAL MEDICINE

## 2020-02-13 RX ORDER — FLUTICASONE PROPIONATE 50 MCG
2 SPRAY, SUSPENSION (ML) NASAL DAILY
Qty: 16 G | Refills: 11 | COMMUNITY
Start: 2020-02-13

## 2020-02-13 ASSESSMENT — MIFFLIN-ST. JEOR: SCORE: 1632.53

## 2020-02-13 NOTE — PATIENT INSTRUCTIONS
Avoiding large meals  ?Ingesting meals low in carbohydrate  ?Avoiding alcohol intake  ?Drinking water with meals  ?Avoiding activities or sudden standing immediately after eating

## 2020-02-13 NOTE — PROGRESS NOTES
"Subjective     Dylan Davila is a 79 year old male who presents to clinic today for the following health issues:    HPI     Several near syncopal or syncopal episodes since last fall. Initial was prior to sinemet. More after starting sinemet. Rep[orts trying to push hydration typically.   Most occur with position changes or shortly after. No sz like activity and no injuries.     Chief Complaint   Patient presents with     Hypertension     possible referral for cardiology-had orthostatic BPs at neurologist 02/10/2020 bp went from 186/67 to 163/91 when going from laying down to standing up     Derm Problem     spot on L side of nose-noticing more congestion             Reviewed and updated as needed this visit by Provider         Review of Systems   ROS COMP: Constitutional, HEENT, cardiovascular, pulmonary, gi and gu systems are negative, except as otherwise noted.      Objective    Temp 98  F (36.7  C) (Temporal)   Ht 1.88 m (6' 2\")   Wt 84.3 kg (185 lb 12.8 oz)   SpO2 98%   BMI 23.86 kg/m    Body mass index is 23.86 kg/m .   Orthostatic Vitals  BP Pulse Position Site Cuff Size Time Date   169/82 86 Supine --- --- 10:37 AM 2/13/2020   156/79 96 Standing --- --- 10:47 AM 2/13/2020   136/82 93 Supine --- --- 11:33 AM 2/13/2020   145/95 93 Standing --- --- 11:39 AM 2/13/2020   No repeat blood pressure data filed.  No peak flow data filed.  No pain information filed.    Physical Exam   GENERAL APPEARANCE: alert and no distress. Slight masked facies  HENT: nose and mouth without ulcers or lesions and normal cephalic/atraumatic  NECK: no adenopathy, no asymmetry, masses, or scars and thyroid normal to palpation  RESP: lungs clear to auscultation - no rales, rhonchi or wheezes  CV: regular rates and rhythm, normal S1 S2, no S3 or S4 and no murmur, click or rub  MS: extremities normal- no gross deformities noted  SKIN: no suspicious lesions or rashes  NEURO: resting tremor, bradykinesia    No results found for this " or any previous visit (from the past 24 hour(s)).        Assessment & Plan     1. Postural hypotension  2. Baroreflex failure  3. Syncope, unspecified syncope type  Related to baroreflex dysfunction from Parkinsons. Complicated by supine hypertension  - Echocardiogram Complete; Future- if normal then consider 24 BP monitor to see degree of HTN and then we could look into offering florinef or similar , while addressing any HTN.    4. Parkinson's disease (H)  As above and per neuro    5. Chronic rhinitis  - fluticasone (FLONASE) 50 MCG/ACT nasal spray; Spray 2 sprays into both nostrils daily  Dispense: 16 g; Refill: 11       See Patient Instructions    No follow-ups on file.    Dereck Lomeli MD  [facility]

## 2020-02-18 ENCOUNTER — HOSPITAL ENCOUNTER (OUTPATIENT)
Dept: CARDIOLOGY | Facility: CLINIC | Age: 80
Discharge: HOME | End: 2020-02-18
Attending: INTERNAL MEDICINE | Admitting: INTERNAL MEDICINE
Payer: MEDICARE

## 2020-02-18 DIAGNOSIS — R55 SYNCOPE, UNSPECIFIED SYNCOPE TYPE: ICD-10-CM

## 2020-02-18 PROCEDURE — 93306 TTE W/DOPPLER COMPLETE: CPT

## 2020-02-18 PROCEDURE — 93306 TTE W/DOPPLER COMPLETE: CPT | Mod: 26 | Performed by: INTERNAL MEDICINE

## 2020-02-23 VITALS — BODY MASS INDEX: 23.85 KG/M2 | OXYGEN SATURATION: 98 % | HEIGHT: 74 IN | TEMPERATURE: 98 F | WEIGHT: 185.8 LBS

## 2020-02-27 ENCOUNTER — HOSPITAL ENCOUNTER (OUTPATIENT)
Dept: CARDIOLOGY | Facility: CLINIC | Age: 80
Discharge: HOME | End: 2020-02-27
Attending: INTERNAL MEDICINE | Admitting: INTERNAL MEDICINE
Payer: MEDICARE

## 2020-02-27 DIAGNOSIS — I10 BAROREFLEX FAILURE: ICD-10-CM

## 2020-02-27 DIAGNOSIS — R03.0 ELEVATED BP WITHOUT DIAGNOSIS OF HYPERTENSION: ICD-10-CM

## 2020-02-27 DIAGNOSIS — I95.1 POSTURAL HYPOTENSION: ICD-10-CM

## 2020-02-27 PROCEDURE — 93788 AMBL BP MNTR W/SW A/R: CPT

## 2020-02-27 PROCEDURE — 93790 AMBL BP MNTR W/SW I&R: CPT | Performed by: INTERNAL MEDICINE

## 2020-04-21 DIAGNOSIS — F51.04 CHRONIC INSOMNIA: ICD-10-CM

## 2020-04-22 NOTE — TELEPHONE ENCOUNTER
Requested Prescriptions   Pending Prescriptions Disp Refills     zolpidem ER (AMBIEN CR) 6.25 MG CR tablet [Pharmacy Med Name: Zolpidem Tartrate ER 6.25 MG Oral Tablet Extended Release]  Last Written Prescription Date:  10/30/2019  Last Fill Quantity: 30,  # refills: 05   Last Office Visit: 2/13/2020   Future Office Visit:      30 tablet 0     Sig: TAKE 1 TABLET BY MOUTH ONCE DAILY IN THE EVENING AS NEEDED FOR SLEEP       There is no refill protocol information for this order

## 2020-04-23 RX ORDER — ZOLPIDEM TARTRATE 6.25 MG/1
TABLET, FILM COATED, EXTENDED RELEASE ORAL
Qty: 30 TABLET | Refills: 0 | Status: SHIPPED | OUTPATIENT
Start: 2020-04-23 | End: 2020-05-18

## 2020-05-18 DIAGNOSIS — F51.04 CHRONIC INSOMNIA: ICD-10-CM

## 2020-05-18 NOTE — TELEPHONE ENCOUNTER
Reason for Call:  Medication or medication refill:    Do you use a [facility]?  Name of the pharmacy and phone number for the current request:  [facility]    Name of the medication requested: zolpidem ER (AMBIEN CR) 6.25 MG CR tablet needs 5 refills.    Other requestnone    Can we leave a detailed message on this number? YES    Phone number patient can be reached at: Work number on file:  [phone] (work)    Best Time: any    Call taken on 5/18/2020 at 2:30 PM by ALEXANDREA SIMEON

## 2020-05-18 NOTE — LETTER
May 20, 2020    Dylan Davila  [address]  [facility] [address]      Dear Ghulam,    We have tried to reach you unsuccessfully by phone.  Please contact the office to make a virtual appointment with Dr. Lomeli to follow up on medication.    Thank you very much for choosing [facility] Please call my office if you have any questions or concerns.      Sincerely,        Su Dudley, [facility]  Care Team for Dr. Lomeli

## 2020-05-20 RX ORDER — ZOLPIDEM TARTRATE 6.25 MG/1
TABLET, FILM COATED, EXTENDED RELEASE ORAL
Qty: 30 TABLET | Refills: 0 | Status: SHIPPED | OUTPATIENT
Start: 2020-05-20 | End: 2020-06-18

## 2020-05-20 NOTE — TELEPHONE ENCOUNTER
Called patient and spoke to wife per chart.   Patient and wife are willing to schedule a virtual visit with the provider. However, there is an additional concern. Please review below.     Per patient/wife the patient has been experiencing edema bilaterally in feet for the last month. Wife describes that both feet appear to have a slight red in color, feel skin tight, and complains of no pain. Please advise.     Okay to leave a message on home number [phone] or on wife's cell [phone]. Writer informed wife that I am filling in at this location and that someone will give them a call.     Polo Santacruz MA  May 20, 2020

## 2020-05-20 NOTE — TELEPHONE ENCOUNTER
Multiple attempts made, no answer no vm, letter sent informing pt of need for appt    Su Dudley CMA

## 2020-05-22 ENCOUNTER — VIRTUAL VISIT (OUTPATIENT)
Dept: INTERNAL MEDICINE | Facility: CLINIC | Age: 80
End: 2020-05-22
Payer: MEDICARE

## 2020-05-22 VITALS — SYSTOLIC BLOOD PRESSURE: 131 MMHG | DIASTOLIC BLOOD PRESSURE: 85 MMHG | HEART RATE: 76 BPM

## 2020-05-22 DIAGNOSIS — G20.A1 PARKINSON'S DISEASE (H): ICD-10-CM

## 2020-05-22 DIAGNOSIS — R60.0 BILATERAL EDEMA OF LOWER EXTREMITY: Primary | ICD-10-CM

## 2020-05-22 DIAGNOSIS — F51.04 CHRONIC INSOMNIA: ICD-10-CM

## 2020-05-22 PROCEDURE — 99214 OFFICE O/P EST MOD 30 MIN: CPT | Mod: 95 | Performed by: INTERNAL MEDICINE

## 2020-05-22 NOTE — PROGRESS NOTES
"Dylan Davila is a 79 year old male who is being evaluated via a billable video visit.      The patient has been notified of following:     \"This video visit will be conducted via a call between you and your physician/provider. We have found that certain health care needs can be provided without the need for an in-person physical exam.  This service lets us provide the care you need with a video conversation.  If a prescription is necessary we can send it directly to your pharmacy.  If lab work is needed we can place an order for that and you can then stop by our lab to have the test done at a later time.    Video visits are billed at different rates depending on your insurance coverage.  Please reach out to your insurance provider with any questions.    If during the course of the call the physician/provider feels a video visit is not appropriate, you will not be charged for this service.\"    Patient has given verbal consent for Video visit? Yes    How would you like to obtain your AVS? Gracielahar    Patient would like the video invitation sent by: Send to e-mail at: [email or web address]    Will anyone else be joining your video visit? No      Subjective     Dylan Davila is a 79 year old male who presents today via video visit for the following health issues:    HPI  Swelling in feet      Duration: 1+ month    Description (location/character/radiation): swelling, pinkish color, cramping, swelling remains the same day/night    Intensity:  moderate    Accompanying signs and symptoms: none    History (similar episodes/previous evaluation): None    Precipitating or alleviating factors: Parkinsons,     Therapies tried and outcome: resting slight elevation     Patient and his wife noted over the last several weeks increasing swelling in his feet.  He has longstanding Parkinson's  and is on Sinemet but has not had a change in dose.  Since the stay at home order he is been less active but not significantly so.  " "His diet is not changed specifically denies any increase in sodium intake.  He denies any shortness of breath, palpitations or weight gain.  His blood pressure runs 130s over 80s with his monitor with heart rate typically 70s to 80s without any errors on the machine.    Video Start Time: 1012            Reviewed and updated as needed this visit by Provider         Review of Systems   Constitutional, HEENT, cardiovascular, pulmonary, gi and gu systems are negative, except as otherwise noted.      Objective    /85   Pulse 76   Estimated body mass index is 23.86 kg/m  as calculated from the following:    Height as of 2/13/20: 1.88 m (6' 2\").    Weight as of 2/13/20: 84.3 kg (185 lb 12.8 oz).   Weight down 20 lbs in last year.     Physical Exam     GENERAL: alert, no distress, elderly, some degree of masked faces noted  RESP: No audible wheeze, cough, or visible cyanosis.  No visible retractions or increased work of breathing.    CV: +2 + bilateral lower extremity pitting edema to upper shin bilaterally.  +2 pedal edema bilaterally  NEURO: Resting tremor      none         Assessment & Plan     1. Bilateral edema of lower extremity  Fairly prominent new onset edema.  Symptoms seem to be limited mostly to the pedal component both he and his wife for surprised that the degree of leg edema noted.    -This requires office appointment to evaluate further.  We have arranged for him to have an appointment next week.  Until then focus on reducing his sodium intake, monitoring weight, keeping feet and legs raised when not ambulating and being as active as possible.    2. Insomnia  Continue ambien prn    3. Parkinson's - appears stable      Return in about 1 week (around 5/29/2020) for Follow up visit.    Dereck Lomeli MD  [facility]      Video-Visit Details    Type of service:  Video Visit    Video End Time:10:45 AM    Originating Location (pt. Location): Home    Distant Location (provider " location):  [facility]     Platform used for Video Visit: AmWell    Return in about 1 week (around 5/29/2020) for Follow up visit.       Dereck Lomeli MD

## 2020-05-26 ENCOUNTER — OFFICE VISIT (OUTPATIENT)
Dept: INTERNAL MEDICINE | Facility: CLINIC | Age: 80
End: 2020-05-26
Payer: MEDICARE

## 2020-05-26 VITALS
WEIGHT: 184.4 LBS | HEART RATE: 84 BPM | TEMPERATURE: 98.6 F | SYSTOLIC BLOOD PRESSURE: 122 MMHG | BODY MASS INDEX: 23.66 KG/M2 | DIASTOLIC BLOOD PRESSURE: 82 MMHG | HEIGHT: 74 IN | OXYGEN SATURATION: 98 %

## 2020-05-26 DIAGNOSIS — G20.A1 PARKINSON'S DISEASE (H): ICD-10-CM

## 2020-05-26 DIAGNOSIS — R60.0 BILATERAL LOWER EXTREMITY EDEMA: Primary | ICD-10-CM

## 2020-05-26 LAB
ALBUMIN SERPL-MCNC: 4 G/DL (ref 3.4–5)
ALP SERPL-CCNC: 94 U/L (ref 40–150)
ALT SERPL W P-5'-P-CCNC: 26 U/L (ref 0–70)
ANION GAP SERPL CALCULATED.3IONS-SCNC: 3 MMOL/L (ref 3–14)
AST SERPL W P-5'-P-CCNC: 20 U/L (ref 0–45)
BILIRUB SERPL-MCNC: 0.5 MG/DL (ref 0.2–1.3)
BUN SERPL-MCNC: 20 MG/DL (ref 7–30)
CALCIUM SERPL-MCNC: 9.1 MG/DL (ref 8.5–10.1)
CHLORIDE SERPL-SCNC: 105 MMOL/L (ref 94–109)
CO2 SERPL-SCNC: 31 MMOL/L (ref 20–32)
CREAT SERPL-MCNC: 0.99 MG/DL (ref 0.66–1.25)
ERYTHROCYTE [DISTWIDTH] IN BLOOD BY AUTOMATED COUNT: 12.9 % (ref 10–15)
GFR SERPL CREATININE-BSD FRML MDRD: 72 ML/MIN/{1.73_M2}
GLUCOSE SERPL-MCNC: 94 MG/DL (ref 70–99)
HCT VFR BLD AUTO: 42.4 % (ref 40–53)
HGB BLD-MCNC: 14.6 G/DL (ref 13.3–17.7)
MCH RBC QN AUTO: 30.5 PG (ref 26.5–33)
MCHC RBC AUTO-ENTMCNC: 34.4 G/DL (ref 31.5–36.5)
MCV RBC AUTO: 89 FL (ref 78–100)
PLATELET # BLD AUTO: 176 10E9/L (ref 150–450)
POTASSIUM SERPL-SCNC: 4 MMOL/L (ref 3.4–5.3)
PROT SERPL-MCNC: 7.4 G/DL (ref 6.8–8.8)
RBC # BLD AUTO: 4.79 10E12/L (ref 4.4–5.9)
SODIUM SERPL-SCNC: 139 MMOL/L (ref 133–144)
WBC # BLD AUTO: 7.2 10E9/L (ref 4–11)

## 2020-05-26 PROCEDURE — 36415 COLL VENOUS BLD VENIPUNCTURE: CPT | Performed by: INTERNAL MEDICINE

## 2020-05-26 PROCEDURE — 85027 COMPLETE CBC AUTOMATED: CPT | Performed by: INTERNAL MEDICINE

## 2020-05-26 PROCEDURE — 99214 OFFICE O/P EST MOD 30 MIN: CPT | Performed by: INTERNAL MEDICINE

## 2020-05-26 PROCEDURE — 80053 COMPREHEN METABOLIC PANEL: CPT | Performed by: INTERNAL MEDICINE

## 2020-05-26 ASSESSMENT — MIFFLIN-ST. JEOR: SCORE: 1626.18

## 2020-05-26 NOTE — PROGRESS NOTES
Subjective     Dylan Davila is a 79 year old male who presents to clinic today for the following health issues:    HPI   Musculoskeletal problem/pain Follow Up      Duration: x1 month    Description  Location: feet, ankles and lower extremities     Intensity:  moderate    Accompanying signs and symptoms: swelling and tightness on bottom of feet     History  Previous similar problem: no   Previous evaluation:  none    Precipitating or alleviating factors:  Trauma or overuse: no   Aggravating factors include: sitting    Therapies tried and outcome: elevation - helps with swelling     Over the past few days since the virtual visit with Dr. Lomeli, the patient has made a point of walking more and keeping his feet elevated.  His wife states that his swelling is gone down significantly and that is feet and lower extremities look the best they have in many weeks.  He denies orthopnea, denies weight gain.  His wife states that she cooks all of their meals from scratch and does not add any extra sodium.  She is very confident that he is taking in much less than 2000 mg a day.      Wt Readings from Last 10 Encounters:   05/26/20 83.6 kg (184 lb 6.4 oz)   02/13/20 84.3 kg (185 lb 12.8 oz)   12/11/19 81.6 kg (180 lb)   10/30/19 81.6 kg (179 lb 12.8 oz)   09/14/19 79.4 kg (175 lb)   08/16/19 81.9 kg (180 lb 8 oz)   12/05/18 86.2 kg (190 lb)   10/16/18 87.1 kg (192 lb)   02/14/18 90.3 kg (199 lb)   12/22/17 90.7 kg (200 lb)       2.  Reports Parkinson disease with shuffling gait tremors.  Has been recommended to try Sinemet, which she Mehran takes for restless leg syndrome at bedtime, but he does not want to take it out of fear producing side effects.  His wife is concerned about his uneven gait.  She reports that he has become much less active and more doing much less walking in her house and outside.  He is prone to occasional episodes of lightheadedness.      Reviewed and updated as needed this visit by Provider      "    Review of Systems   Constitutional, HEENT, cardiovascular, pulmonary, gi and gu systems are negative, except as otherwise noted.      Objective    /82   Pulse 84   Temp 98.6  F (37  C) (Temporal)   Ht 1.88 m (6' 2\")   Wt 83.6 kg (184 lb 6.4 oz)   SpO2 98%   BMI 23.68 kg/m    Body mass index is 23.68 kg/m .  Physical Exam   GENERAL alert and no distress  EYES:  Normal sclera,conjunctiva, EOMI  HENT: oral and posterior pharynx without lesions or erythema, facies symmetric  NECK: Neck supple. No LAD, without thyroidmegaly.  RESP: Clear to ausculation bilaterally without wheezes or crackles. Normal BS in all fields.  CV: RRR normal S1S2 without murmurs, rubs or gallops.  LYMPH: no cervical lymph adenopathy appreciated  MS: extremities- no gross deformities of the visible extremities noted,   EXT:  trace lower extremity edema  PSYCH: Alert and oriented times 3; speech- coherent  SKIN:  No obvious significant skin lesions on visible portions of face   NEURO:   Obvious significant pill-rolling tremor present in both hands, shuffling gait, decreased proximal muscle hip flexor strength (was difficult to rise out of a chair), lack of facial expressions.     Diagnostic Test Results:  Labs reviewed in Epic          (R60.0) Bilateral lower extremity edema  (primary encounter diagnosis)  Comment: Edema improved with leg elevation and more walking over the past few days.  At this point I suspect this is a simple matter of walking.  There is no evidence for fluid overload.  They do not take in any extra sodium in their diet.  Recommend he consider compression stockings if this becomes an issue and he is unable to walk.    Plan: CBC with platelets, Comprehensive metabolic         panel            (G20) Parkinson's disease (H)  Comment: Reviewed his Parkinson's disease at length, including treatment options.  It is obvious that his current status is getting worse, his wife states that his activity level has decreased " "as a result of this.  He does appear to be unstable in his gait especially initiating movements out of a chair or seated position.  He reluctantly admits that  His symptoms are worsening but he still worried about taking medicine out of fear of side effects, not having actually had side effects from the Sinemet he currently takes for restless legs.  I asked that he return to see a neurologist to further discuss options for treatment and to try and find some degree of medication therapy he is comfortable with that can manage his symptoms.  I asked him to consider not taking a medication to manage the Parkinson's as a \"side effect\", and that this will only continue to worsen and impact his life negatively.  Discussed autonomic instability associated with Parkinson's disease including  Orthostatic hypotension.  Asked him to maintain well hydrated and possibly even consider intermittent liberalization of sodium intake if he develops more regular postural hypotension.  Plan: CBC with platelets, Comprehensive metabolic         panel             Return to see primary MD in 2 to 3 months for routine follow-up on these issues, sooner if needed.    I spent greater than 25 minutes with pt, greater than 50% of time was educational and counseling.   "

## 2020-05-26 NOTE — PATIENT INSTRUCTIONS
"Lower extremity edema:  No evidence for cardiac cause for this.     *  Keep sodium content strictly below 2000 mg per day.   \"wherever sodium goes, water follows\"    *  Try to keep as active as possible.  The veins of the lower legs rely on the muscles to do the pumping for the veins.  If you are not walking, then you are not pumping.     *  Trial of compression stockings if the symptoms are not better.    *  Follow up with the Neurology Clinic to discuss more aggressive management of the Parkinsons disease if you feel that it is causing you more disability.      "

## 2020-06-17 ENCOUNTER — TRANSFERRED RECORDS (OUTPATIENT)
Dept: HEALTH INFORMATION MANAGEMENT | Facility: CLINIC | Age: 80
End: 2020-06-17

## 2020-06-18 ENCOUNTER — MEDICAL CORRESPONDENCE (OUTPATIENT)
Dept: HEALTH INFORMATION MANAGEMENT | Facility: CLINIC | Age: 80
End: 2020-06-18

## 2020-07-20 DIAGNOSIS — F51.04 CHRONIC INSOMNIA: ICD-10-CM

## 2020-07-21 RX ORDER — ZOLPIDEM TARTRATE 6.25 MG/1
TABLET, FILM COATED, EXTENDED RELEASE ORAL
Qty: 30 TABLET | Refills: 5 | Status: SHIPPED | OUTPATIENT
Start: 2020-07-21 | End: 2020-11-24

## 2020-11-12 ENCOUNTER — HOSPITAL ENCOUNTER (OUTPATIENT)
Dept: GENERAL RADIOLOGY | Facility: CLINIC | Age: 80
End: 2020-11-12
Attending: PSYCHIATRY & NEUROLOGY
Payer: MEDICARE

## 2020-11-12 ENCOUNTER — HOSPITAL ENCOUNTER (OUTPATIENT)
Dept: SPEECH THERAPY | Facility: CLINIC | Age: 80
End: 2020-11-12
Attending: PSYCHIATRY & NEUROLOGY
Payer: MEDICARE

## 2020-11-12 DIAGNOSIS — R55 SYNCOPE AND COLLAPSE: ICD-10-CM

## 2020-11-12 DIAGNOSIS — R05.9 COUGHING: ICD-10-CM

## 2020-11-12 DIAGNOSIS — G20.C PRIMARY PARKINSONISM (H): ICD-10-CM

## 2020-11-12 PROCEDURE — 92610 EVALUATE SWALLOWING FUNCTION: CPT | Mod: GN,XU | Performed by: SPEECH-LANGUAGE PATHOLOGIST

## 2020-11-12 PROCEDURE — 92611 MOTION FLUOROSCOPY/SWALLOW: CPT | Mod: GN | Performed by: SPEECH-LANGUAGE PATHOLOGIST

## 2020-11-12 PROCEDURE — 74230 X-RAY XM SWLNG FUNCJ C+: CPT

## 2020-11-12 RX ORDER — BARIUM SULFATE 400 MG/ML
SUSPENSION ORAL ONCE
Status: COMPLETED | OUTPATIENT
Start: 2020-11-12 | End: 2020-11-12

## 2020-11-12 RX ADMIN — BARIUM SULFATE 20 ML: 400 SUSPENSION ORAL at 11:17

## 2020-11-12 NOTE — PROGRESS NOTES
11/12/20 1209   General Information   Type Of Visit Initial   Start Of Care Date 11/12/20   Referring Physician Candace Collins MD   Orders Other   Orders Comment Clinical and video swallow study   Medical Diagnosis Dysphagia   Onset Of Illness/injury Or Date Of Surgery 01/26/16   Precautions/limitations No Known Precautions/limitations   Hearing [place] with aids   Pertinent History of Current Problem/OT: Additional Occupational Profile Info Patient is a very pleasant 79 year old male with a history of Parkinson's diesease. He was referred for an OP VFSS due to increased coughing when eating. He consumes a regular diet and thin liquids at home. His last video swallow study was 01/26/16. Swallow function was grossly intact on the video with one episode of flash penetration.    Respiratory Status Room air   Prior Level Of Function Swallowing   Prior Level Of Function Comment Consumes a regular diet and thin liquids.    Patient Role/employment History Retired   Clinical Swallow Evaluation   Oral Musculature generally intact   Structural Abnormalities none present   Dentition present and adequate   Mucosal Quality dry;other (see comments)  (Question possible thrush.)   Mandibular Strength and Mobility intact   Oral Labial Strength and Mobility WFL   Lingual Strength and Mobility impaired anterior elevation   Velar Elevation intact   Buccal Strength and Mobility intact   Laryngeal Function Cough;Throat clear;Swallow;Dry swallow palpated;Voicing initiated  (Soft voice.)   Oral Musculature Comments Mildly impaired.    Additional Documentation Yes   Additional evaluation(s) completed today Yes   Rationale for completing additional evaluation No PO trials given due to risk for aspiration and will proceed with the VFSS.     VFSS Eval: Radiology   Radiologist Dr. Walters   Views Taken left lateral   Physical Location of Procedure [facility]   VFSS Eval: Thin Liquid Texture Trial   Mode of Presentation, Thin Liquid straw;self-fed   Order  of Presentation 1 2 5 8    Preparatory Phase WFL   Oral Phase, Thin Liquid Premature pharyngeal entry   Pharyngeal Phase, Thin Liquid Residue in valleculae;Residue in pyriform sinus   Rosenbek's Penetration Aspiration Scale: Thin Liquid Trial Results 2 - contrast enters airway, remains above the vocal cords, no residue remains (penetration)   Diagnostic Statement Minimal trace penetration intermittently with mild vallecular and pyriform sinuses residue.    VFSS Eval: Nectar Thick Liquid Texture Trial   Mode of Presentation, Nectar straw;self-fed   Order of Presentation 3   Preparatory Phase WFL   Oral Phase, Nectar WFL   Pharyngeal Phase, Abanda WFL   Rosenbek's Penetration Aspiration Scale: Nectar-Thick Liquid Trial Results 1 - no aspiration, contrast does not enter airway   VFSS Eval: Puree Solid Texture Trial   Mode of Presentation, Puree spoon;self-fed   Order of Presentation 4   Preparatory Phase WFL   Oral Phase, Puree WFL   Pharyngeal Phase, Puree Residue in valleculae   Rosenbek's Penetration Aspiration Scale: Puree Food Trial Results 1 - no aspiration, contrast does not enter airway   Diagnostic Statement Mild vallecular residue.   VFSS Eval: Semisolid Texture Trial   Mode of Presentation, Semisolid spoon;self-fed   Order of Presentation 5   Preparatory Phase WFL   Oral Phase, Semisolid Premature pharyngeal entry   Pharyngeal Phase, Semisolid Residue in valleculae   Rosenbek's Penetration Aspiration Scale: Semisolid Food Trial Results 1 - no aspiration, contrast does not enter airway   Diagnostic Statement Mild vallecular residue.   VFSS Eval: Solid Food Texture Trial   Mode of Presentation, Solid spoon;self-fed   Order of Presentation 7   Preparatory Phase WFL   Oral Phase, Solid Premature pharyngeal entry   Pharyngeal Phase, Solid Residue in valleculae   Rosenbek's Penetration Aspiration Scale: Solid Food Trial Results 1 - no aspiration, contrast does not enter airway   Diagnostic Statement Mild  vallecular residue.   Swallow Eval: Clinical Impressions   Functional Assessment Scale (FAS) 5   Dysphagia Outcome Severity Scale (ARSH) Level 5 - ARSH   Treatment Diagnosis Minimal to mild oral and pharyngeal dysphagia   Diet texture recommendations Regular diet;Thin liquids   Recommended Feeding/Eating Techniques alternate between small bites and sips of food/liquid;check mouth frequently for oral residue/pocketing;hard swallow w/ each bite or sip;maintain upright posture during/after eating for 30 mins;small sips/bites   Anticipated Discharge Disposition home w/ assist  (Wife )   Risks and Benefits of Treatment have been explained. Yes   Patient, family and/or staff in agreement with Plan of Care Yes   Clinical Impression Comments Patient presents with minimal to mild oral and pharyngeal dysphagia on today's study secondary to Parkinson's disease. Deficits include; mildly reduced BOT retraction, premature entry and incomplete epiglottic closure. This resulted in trace penetration of thin liquids via the straw intermittently with mild vallecular and pyriform sinus residue. No penetration via the straw with nectar thick liquids. Mildly reduced BOT retraction with pudding, semi-solids and solids with mild vallecular residue that cleared with a cued second swallow or liquid rinse. Following the evaluation, patient and his wife verbalized understanding of SLP education regarding VFSS results and recommendations with hanouts given. Recommend; contine on a regular diet and thin liquids. Patient should be upright for all PO intake, hard swallow x2, small bites/sips and alternate liquids/solids. No further ST f/u for dysphagia is indicated at this time.    Total Session Time   SLP Eval: oral/pharyngeal swallow function, clinical minutes (12197) 10   SLP Eval: VideoFluoroscopic Swallow function Minutes (48420) 20   Total Evaluation Time 30

## 2020-11-21 ENCOUNTER — MYC MEDICAL ADVICE (OUTPATIENT)
Dept: INTERNAL MEDICINE | Facility: CLINIC | Age: 80
End: 2020-11-21

## 2020-11-21 DIAGNOSIS — Z13.1 SCREENING FOR DIABETES MELLITUS: ICD-10-CM

## 2020-11-21 DIAGNOSIS — E78.5 HYPERLIPIDEMIA LDL GOAL <100: Primary | ICD-10-CM

## 2020-11-24 DIAGNOSIS — R93.1 ABNORMAL CARDIAC CT ANGIOGRAPHY: ICD-10-CM

## 2020-11-24 DIAGNOSIS — F51.04 CHRONIC INSOMNIA: ICD-10-CM

## 2020-11-24 DIAGNOSIS — E78.5 HYPERLIPIDEMIA LDL GOAL <100: ICD-10-CM

## 2020-11-24 NOTE — TELEPHONE ENCOUNTER
Routing refill request to provider for review/approval because:  Labs not current:  Lipids  Please advise if ok with placing future orders    Zolpidem    Routing refill request to provider for review/approval because:  Drug not on the [facility] refill protocol

## 2020-11-24 NOTE — TELEPHONE ENCOUNTER
PCP please advise of [employer] message regarding blood work?    Ok with place fasting labs?    Last office visit: 5/26/2020   Future Office Visit:   Next 5 appointments (look out 90 days)    Dec 18, 2020  3:30 PM  Return Visit with Frandy Christiansen MD  [facility] ([facility]) [address] [facility] [address]  [facility] [address]  [phone]           Please inform patient if ok with lab only 1-2 days prior to a virtual visit to discuss/review results, if appropriate, via [employer].    Mirna LEWN, RN, PHN

## 2020-11-25 ENCOUNTER — MYC MEDICAL ADVICE (OUTPATIENT)
Dept: INTERNAL MEDICINE | Facility: CLINIC | Age: 80
End: 2020-11-25

## 2020-11-25 RX ORDER — ATORVASTATIN CALCIUM 10 MG/1
TABLET, FILM COATED ORAL
Qty: 90 TABLET | Refills: 0 | Status: SHIPPED | OUTPATIENT
Start: 2020-11-25 | End: 2020-12-31

## 2020-11-25 RX ORDER — ZOLPIDEM TARTRATE 6.25 MG/1
TABLET, FILM COATED, EXTENDED RELEASE ORAL
Qty: 30 TABLET | Refills: 1 | Status: SHIPPED | OUTPATIENT
Start: 2020-11-25 | End: 2020-12-31

## 2020-12-09 ENCOUNTER — ALLIED HEALTH/NURSE VISIT (OUTPATIENT)
Dept: NURSING | Facility: CLINIC | Age: 80
End: 2020-12-09
Payer: MEDICARE

## 2020-12-09 DIAGNOSIS — Z23 NEED FOR PROPHYLACTIC VACCINATION AND INOCULATION AGAINST INFLUENZA: Primary | ICD-10-CM

## 2020-12-09 DIAGNOSIS — Z13.1 SCREENING FOR DIABETES MELLITUS: ICD-10-CM

## 2020-12-09 DIAGNOSIS — E78.5 HYPERLIPIDEMIA LDL GOAL <100: ICD-10-CM

## 2020-12-09 LAB
ANION GAP SERPL CALCULATED.3IONS-SCNC: 4 MMOL/L (ref 3–14)
BUN SERPL-MCNC: 21 MG/DL (ref 7–30)
CALCIUM SERPL-MCNC: 9.5 MG/DL (ref 8.5–10.1)
CHLORIDE SERPL-SCNC: 105 MMOL/L (ref 94–109)
CHOLEST SERPL-MCNC: 130 MG/DL
CO2 SERPL-SCNC: 30 MMOL/L (ref 20–32)
CREAT SERPL-MCNC: 1.04 MG/DL (ref 0.66–1.25)
GFR SERPL CREATININE-BSD FRML MDRD: 68 ML/MIN/{1.73_M2}
GLUCOSE SERPL-MCNC: 87 MG/DL (ref 70–99)
HDLC SERPL-MCNC: 42 MG/DL
LDLC SERPL CALC-MCNC: 71 MG/DL
NONHDLC SERPL-MCNC: 88 MG/DL
POTASSIUM SERPL-SCNC: 3.8 MMOL/L (ref 3.4–5.3)
SODIUM SERPL-SCNC: 139 MMOL/L (ref 133–144)
TRIGL SERPL-MCNC: 83 MG/DL

## 2020-12-09 PROCEDURE — 80048 BASIC METABOLIC PNL TOTAL CA: CPT | Performed by: INTERNAL MEDICINE

## 2020-12-09 PROCEDURE — 90662 IIV NO PRSV INCREASED AG IM: CPT

## 2020-12-09 PROCEDURE — G0008 ADMIN INFLUENZA VIRUS VAC: HCPCS

## 2020-12-09 PROCEDURE — 80061 LIPID PANEL: CPT | Performed by: INTERNAL MEDICINE

## 2020-12-09 PROCEDURE — 36415 COLL VENOUS BLD VENIPUNCTURE: CPT | Performed by: INTERNAL MEDICINE

## 2020-12-18 ENCOUNTER — OFFICE VISIT (OUTPATIENT)
Dept: UROLOGY | Facility: CLINIC | Age: 80
End: 2020-12-18
Payer: MEDICARE

## 2020-12-18 VITALS
HEIGHT: 74 IN | OXYGEN SATURATION: 98 % | BODY MASS INDEX: 23.61 KG/M2 | DIASTOLIC BLOOD PRESSURE: 82 MMHG | SYSTOLIC BLOOD PRESSURE: 128 MMHG | WEIGHT: 184 LBS

## 2020-12-18 DIAGNOSIS — Z80.42 FAMILY HX OF PROSTATE CANCER: Primary | ICD-10-CM

## 2020-12-18 DIAGNOSIS — N40.0 BENIGN PROSTATIC HYPERPLASIA WITHOUT LOWER URINARY TRACT SYMPTOMS: ICD-10-CM

## 2020-12-18 DIAGNOSIS — N31.9 NEUROGENIC BLADDER: ICD-10-CM

## 2020-12-18 DIAGNOSIS — N40.0 BENIGN PROSTATIC HYPERPLASIA WITHOUT LOWER URINARY TRACT SYMPTOMS: Primary | ICD-10-CM

## 2020-12-18 LAB
ALBUMIN UR-MCNC: NEGATIVE MG/DL
APPEARANCE UR: CLEAR
BILIRUB UR QL STRIP: NEGATIVE
COLOR UR AUTO: YELLOW
GLUCOSE UR STRIP-MCNC: NEGATIVE MG/DL
HGB UR QL STRIP: ABNORMAL
KETONES UR STRIP-MCNC: NEGATIVE MG/DL
LEUKOCYTE ESTERASE UR QL STRIP: NEGATIVE
NITRATE UR QL: NEGATIVE
PH UR STRIP: 6 PH (ref 5–7)
PSA SERPL-MCNC: 0.96 NG/ML (ref 0–4)
SOURCE: ABNORMAL
SP GR UR STRIP: 1.02 (ref 1–1.03)
UROBILINOGEN UR STRIP-ACNC: 1 EU/DL (ref 0.2–1)

## 2020-12-18 PROCEDURE — 84153 ASSAY OF PSA TOTAL: CPT | Performed by: UROLOGY

## 2020-12-18 PROCEDURE — 81003 URINALYSIS AUTO W/O SCOPE: CPT | Performed by: UROLOGY

## 2020-12-18 PROCEDURE — 99213 OFFICE O/P EST LOW 20 MIN: CPT | Performed by: UROLOGY

## 2020-12-18 RX ORDER — TOLTERODINE 4 MG/1
4 CAPSULE, EXTENDED RELEASE ORAL DAILY
Qty: 90 CAPSULE | Refills: 4 | Status: SHIPPED | OUTPATIENT
Start: 2020-12-18 | End: 2021-07-12

## 2020-12-18 RX ORDER — DUTASTERIDE 0.5 MG/1
0.5 CAPSULE, LIQUID FILLED ORAL DAILY
Qty: 90 CAPSULE | Refills: 4 | Status: SHIPPED | OUTPATIENT
Start: 2020-12-18 | End: 2021-12-23

## 2020-12-18 ASSESSMENT — PAIN SCALES - GENERAL: PAINLEVEL: NO PAIN (0)

## 2020-12-18 ASSESSMENT — MIFFLIN-ST. JEOR: SCORE: 1619.37

## 2020-12-18 NOTE — LETTER
2020      RE: Dylan Davila  [address]  [facility] [address]       Ghulam Davila is a 80-year-old male with a strong family history of adenocarcinoma of the prostate.  His father  of his disease and his brother has had prostate cancer.  Mr. Davila is afflicted with Parkinson's disease that has been progressive.  He has a neurogenic bladder and is on tolterodine daily-he would like to switch from twice a day to tolterodine ER.  He has a normal urinalysis.  PSA is 0.96  Other past medical history: Basal cell carcinoma, degenerative lumbar disc disease, ED, melanoma, osteopenia, hyperlipidemia, palpitations, history of pneumonia, prostatitis, hearing loss, former smoker  Surgeries reviewed  Medications: Lipitor, Sinemet, vitamin D, coenzyme Q 10, Avodart, Flonase spray, ketoconazole cream, metronidazole cream, multivitamin, tolterodine, zolpidem ER  Allergies: Calcium, codeine, Flomax  Review of systems: No dysuria or hematuria  Will be visiting with his neurologist in 4 weeks and expects to increase his Sinemet to 3 times daily.  Exam: Alert and oriented, signs of Parkinson's disease, normal vital signs, normal respirations.  Normal sphincter tone, no rectal mass or impaction, enlarged and benign feeling prostate, normal seminal vesicles  Assessment: Parkinson's disease with neurogenic bladder, strong family history of prostate cancer  Plan: Renew Avodart, start tolterodine ER 4 mg daily.  Yearly PSA and digital rectal exam for now        Frandy Christiansen MD

## 2020-12-18 NOTE — PROGRESS NOTES
Ghulam Davila is a 80-year-old male with a strong family history of adenocarcinoma of the prostate.  His father  of his disease and his brother has had prostate cancer.  Mr. Davila is afflicted with Parkinson's disease that has been progressive.  He has a neurogenic bladder and is on tolterodine daily-he would like to switch from twice a day to tolterodine ER.  He has a normal urinalysis.  PSA is 0.96  Other past medical history: Basal cell carcinoma, degenerative lumbar disc disease, ED, melanoma, osteopenia, hyperlipidemia, palpitations, history of pneumonia, prostatitis, hearing loss, former smoker  Surgeries reviewed  Medications: Lipitor, Sinemet, vitamin D, coenzyme Q 10, Avodart, Flonase spray, ketoconazole cream, metronidazole cream, multivitamin, tolterodine, zolpidem ER  Allergies: Calcium, codeine, Flomax  Review of systems: No dysuria or hematuria  Will be visiting with his neurologist in 4 weeks and expects to increase his Sinemet to 3 times daily.  Exam: Alert and oriented, signs of Parkinson's disease, normal vital signs, normal respirations.  Normal sphincter tone, no rectal mass or impaction, enlarged and benign feeling prostate, normal seminal vesicles  Assessment: Parkinson's disease with neurogenic bladder, strong family history of prostate cancer  Plan: Renew Avodart, start tolterodine ER 4 mg daily.  Yearly PSA and digital rectal exam for now

## 2020-12-18 NOTE — LETTER
2020       RE: Dylan Davila  [address]  [facility] [address]     Dear Colleague,    Thank you for referring your patient, Dylan Davila, to the [facility] [place] at [facility]. Please see a copy of my visit note below.    Ghulam Davila is a 80-year-old male with a strong family history of adenocarcinoma of the prostate.  His father  of his disease and his brother has had prostate cancer.  Mr. Davila is afflicted with Parkinson's disease that has been progressive.  He has a neurogenic bladder and is on tolterodine daily-he would like to switch from twice a day to tolterodine ER.  He has a normal urinalysis.  PSA is 0.96  Other past medical history: Basal cell carcinoma, degenerative lumbar disc disease, ED, melanoma, osteopenia, hyperlipidemia, palpitations, history of pneumonia, prostatitis, hearing loss, former smoker  Surgeries reviewed  Medications: Lipitor, Sinemet, vitamin D, coenzyme Q 10, Avodart, Flonase spray, ketoconazole cream, metronidazole cream, multivitamin, tolterodine, zolpidem ER  Allergies: Calcium, codeine, Flomax  Review of systems: No dysuria or hematuria  Will be visiting with his neurologist in 4 weeks and expects to increase his Sinemet to 3 times daily.  Exam: Alert and oriented, signs of Parkinson's disease, normal vital signs, normal respirations.  Normal sphincter tone, no rectal mass or impaction, enlarged and benign feeling prostate, normal seminal vesicles  Assessment: Parkinson's disease with neurogenic bladder, strong family history of prostate cancer  Plan: Renew Avodart, start tolterodine ER 4 mg daily.  Yearly PSA and digital rectal exam for now        Again, thank you for allowing me to participate in the care of your patient.      Sincerely,    Frandy Christiansen MD

## 2020-12-23 ENCOUNTER — TELEPHONE (OUTPATIENT)
Dept: UROLOGY | Facility: CLINIC | Age: 80
End: 2020-12-23

## 2020-12-24 NOTE — TELEPHONE ENCOUNTER
Central Prior Authorization Team   Phone: [phone]      Unable to complete P/A request on Cover My Meds, as it is not able to process this request through ePA. I have downloaded a P/A form and will complete and fax in.    PA Initiation    Medication: tolterodine ER (DETROL LA) 4 MG 24 hr capsule  Insurance Company: [employer] Part D - Phone [phone] Fax [phone]  Pharmacy Filling the Rx: [id number] [place] [facility] [place], [place] - [address] [place] [address]  Filling Pharmacy Phone: [phone]  Filling Pharmacy Fax:    Start Date: 12/24/2020

## 2020-12-24 NOTE — TELEPHONE ENCOUNTER
Step Therapy Approval    Authorization Effective Date: 9/25/2020  Authorization Expiration Date: 12/24/2021  Medication: tolterodine ER (DETROL LA) 4 MG 24 hr capsule  Approved Dose/Quantity:   Reference #:     Insurance Company: [employer] Part D - Phone [phone] Fax [phone]  Which Pharmacy is filling the prescription (Not needed for infusion/clinic administered): [facility] [id number] [place] [address]  Pharmacy Notified: Yes - spoke to woman on the prescriber line  Patient Notified:

## 2020-12-28 ENCOUNTER — TELEPHONE (OUTPATIENT)
Dept: UROLOGY | Facility: CLINIC | Age: 80
End: 2020-12-28

## 2020-12-28 ENCOUNTER — MYC MEDICAL ADVICE (OUTPATIENT)
Dept: INTERNAL MEDICINE | Facility: CLINIC | Age: 80
End: 2020-12-28

## 2020-12-28 NOTE — TELEPHONE ENCOUNTER
[facility] Call Center    Phone Message    May a detailed message be left on voicemail: yes     Reason for Call: Other: pt and wife saw Dr. Christiansen on the 18th and left a pen behind. The pen was a (sveta) brand and is silver and black colored. Pt's wife wants to know if we found the item at all. Please contact pt's wife at [phone].     Action Taken: Message routed to:  [facility] ([facility] urological scheduling pool    Travel Screening: Not Applicable

## 2020-12-29 ASSESSMENT — ACTIVITIES OF DAILY LIVING (ADL)
CURRENT_FUNCTION: SHOPPING REQUIRES ASSISTANCE
CURRENT_FUNCTION: HOUSEWORK REQUIRES ASSISTANCE
CURRENT_FUNCTION: PREPARING MEALS REQUIRES ASSISTANCE
CURRENT_FUNCTION: LAUNDRY REQUIRES ASSISTANCE
CURRENT_FUNCTION: TRANSPORTATION REQUIRES ASSISTANCE

## 2020-12-31 ENCOUNTER — VIRTUAL VISIT (OUTPATIENT)
Dept: INTERNAL MEDICINE | Facility: CLINIC | Age: 80
End: 2020-12-31
Payer: MEDICARE

## 2020-12-31 DIAGNOSIS — G20.A1 PARKINSON'S DISEASE (H): ICD-10-CM

## 2020-12-31 DIAGNOSIS — R93.1 ABNORMAL CARDIAC CT ANGIOGRAPHY: ICD-10-CM

## 2020-12-31 DIAGNOSIS — Z00.00 ENCOUNTER FOR MEDICARE ANNUAL WELLNESS EXAM: Primary | ICD-10-CM

## 2020-12-31 DIAGNOSIS — E78.5 HYPERLIPIDEMIA LDL GOAL <100: ICD-10-CM

## 2020-12-31 DIAGNOSIS — F51.04 CHRONIC INSOMNIA: ICD-10-CM

## 2020-12-31 PROCEDURE — G0439 PPPS, SUBSEQ VISIT: HCPCS | Mod: 95 | Performed by: INTERNAL MEDICINE

## 2020-12-31 PROCEDURE — 99213 OFFICE O/P EST LOW 20 MIN: CPT | Mod: 25 | Performed by: INTERNAL MEDICINE

## 2020-12-31 RX ORDER — ZOLPIDEM TARTRATE 6.25 MG/1
TABLET, FILM COATED, EXTENDED RELEASE ORAL
Qty: 90 TABLET | Refills: 1 | Status: SHIPPED | OUTPATIENT
Start: 2020-12-31 | End: 2021-06-29

## 2020-12-31 RX ORDER — ATORVASTATIN CALCIUM 10 MG/1
TABLET, FILM COATED ORAL
Qty: 90 TABLET | Refills: 3 | Status: SHIPPED | OUTPATIENT
Start: 2020-12-31 | End: 2022-03-04

## 2020-12-31 NOTE — PATIENT INSTRUCTIONS
Patient Education   Personalized Prevention Plan  You are due for the preventive services outlined below.  Your care team is available to assist you in scheduling these services.  If you have already completed any of these items, please share that information with your care team to update in your medical record.  Health Maintenance Due   Topic Date Due     Hepatitis C Screening  11/17/1958     Hepatitis B Vaccine (1 of 3 - Risk 3-dose series) 11/17/1960     Zoster (Shingles) Vaccine (2 of 3) 11/26/2009     FALL RISK ASSESSMENT  10/30/2020

## 2020-12-31 NOTE — PROGRESS NOTES
"Dylan Davila is a 80 year old male who is being evaluated via a billable video visit.      The patient has been notified of following:     \"This video visit will be conducted via a call between you and your physician/provider. We have found that certain health care needs can be provided without the need for an in-person physical exam.  This service lets us provide the care you need with a video conversation.  If a prescription is necessary we can send it directly to your pharmacy.  If lab work is needed we can place an order for that and you can then stop by our lab to have the test done at a later time.    Video visits are billed at different rates depending on your insurance coverage.  Please reach out to your insurance provider with any questions.    If during the course of the call the physician/provider feels a video visit is not appropriate, you will not be charged for this service.\"    Patient has given verbal consent for Video visit? Yes  How would you like to obtain your AVS? MyChart  If you are dropped from the video visit, the video invite should be resent to: Send to e-mail at: [email or web address]  Will anyone else be joining your video visit? No    Subjective     Dylan Davila is a 80 year old male who presents today via video visit for the following health issues:    HPI     Annual Wellness Visit    Patient has been advised of split billing requirements and indicates understanding: Yes     Are you in the first 12 months of your Medicare Part B coverage?  No    Physical Health:    Healthy Habits:     In general, how would you rate your overall health?  Fair    Frequency of exercise:  2-3 days/week    Duration of exercise:  15-30 minutes    Do you usually eat at least 4 servings of fruit and vegetables a day, include whole grains    & fiber and avoid regularly eating high fat or \"junk\" foods?  Yes    Taking medications regularly:  Yes    Medication side effects:  Not applicable    Ability to " successfully perform activities of daily living:  Transportation requires assistance, shopping requires assistance, preparing meals requires assistance, housework requires assistance and laundry requires assistance    Home Safety:  No safety concerns identified    Hearing Impairment:  Need to ask people to speak up or repeat themselves and difficulty understanding soft or whispered speech    In the past 6 months, have you been bothered by leaking of urine?  No    In general, how would you rate your overall mental or emotional health?  Fair      PHQ-2 Total Score: 2    Additional concerns today:  No    There were no vitals taken for this visit.  Weight: Unable to obtain due to video visit  Height: Based on patient-provided information  BMI: Based on patient-provided information  Blood Pressure: Based on patient-provided information    Mental Health:    PHQ-2 Score: 2    Do you feel safe in your environment? YES    Have you ever done Advance Care Planning? (For example, a Health Directive, POLST, or a discussion with a medical provider or your loved ones about your wishes)? Yes, patient states has an Advance Care Planning document and will bring a copy to the clinic.    Fall risk:  Fallen 2 or more times in the past year?: No  Any fall with injury in the past year?: No    Cognitive Screenin) Repeat 3 items (Leader, Season, Table)    2) Clock draw: NORMAL  3) 3 item recall: Recalls 3 objects  Results: 3 items recalled: COGNITIVE IMPAIRMENT LESS LIKELY    Mini-CogTM Copyright S Kiesha. Licensed by the author for use in [facility]; reprinted with permission (brant@[email or web address]). All rights reserved.      Do you have sleep apnea, excessive snoring or daytime drowsiness?: no    Current providers sharing in care for this patient include:   Patient Care Team:  Dereck Lomeli MD as PCP - General (Internal Medicine)  Dereck Lomeli MD as Assigned PCP  Frandy Christiansen MD as Assigned Surgical  Provider    Patient has been advised of split billing requirements and indicates understanding: Yes      Video Start Time: 227    Hyperlipidemia Follow-Up      Are you regularly taking any medication or supplement to lower your cholesterol?   Yes-      Are you having muscle aches or other side effects that you think could be caused by your cholesterol lowering medication?  No    Review of Systems   Constitutional, HEENT, cardiovascular, pulmonary, gi and gu systems are negative, except as otherwise noted.      Objective           Vitals:  No vitals were obtained today due to virtual visit.    Physical Exam     GENERAL: Healthy, alert and no distress  EYES: Eyes grossly normal to inspection.  No discharge or erythema, or obvious scleral/conjunctival abnormalities.  RESP: No audible wheeze, cough, or visible cyanosis.  No visible retractions or increased work of breathing.    SKIN: Visible skin clear. No significant rash, abnormal pigmentation or lesions.  NEURO: Cranial nerves grossly intact.  Mentation and speech appropriate for age.  PSYCH: Mentation appears normal, affect normal/bright, judgement and insight intact, normal speech and appearance well-groomed.              Assessment & Plan   (Z00.00) Encounter for Medicare annual wellness exam  (primary encounter diagnosis)  Plan: Updated screening, immunizations, prevention.  Please see health maintenance list, care gaps     (E78.5) Hyperlipidemia LDL goal <100  (R93.1) minimal calcified plaque on CV CT angio  Plan: atorvastatin (LIPITOR) 10 MG tablet        continue    (F51.04) Chronic insomnia  Plan: zolpidem ER (AMBIEN CR) 6.25 MG CR tablet            See Patient Instructions    No follow-ups on file.    Dereck Lomeli MD  [facility]      Video-Visit Details    Type of service:  Video Visit    Video End Time:3:04 PM    Originating Location (pt. Location): Home    Distant Location (provider location):  [facility]  [facility][1]     Platform used for Video Visit: Mary

## 2021-01-26 ENCOUNTER — MYC MEDICAL ADVICE (OUTPATIENT)
Dept: INTERNAL MEDICINE | Facility: CLINIC | Age: 81
End: 2021-01-26

## 2021-02-02 ENCOUNTER — IMMUNIZATION (OUTPATIENT)
Dept: NURSING | Facility: CLINIC | Age: 81
End: 2021-02-02
Payer: MEDICARE

## 2021-02-02 PROCEDURE — 0001A PR COVID VAC PFIZER DIL RECON 30 MCG/0.3 ML IM: CPT

## 2021-02-02 PROCEDURE — 91300 PR COVID VAC PFIZER DIL RECON 30 MCG/0.3 ML IM: CPT

## 2021-02-23 ENCOUNTER — IMMUNIZATION (OUTPATIENT)
Dept: NURSING | Facility: CLINIC | Age: 81
End: 2021-02-23
Attending: EMERGENCY MEDICINE
Payer: MEDICARE

## 2021-02-23 PROCEDURE — 0002A PR COVID VAC PFIZER DIL RECON 30 MCG/0.3 ML IM: CPT

## 2021-02-23 PROCEDURE — 91300 PR COVID VAC PFIZER DIL RECON 30 MCG/0.3 ML IM: CPT

## 2021-02-26 ENCOUNTER — VIRTUAL VISIT (OUTPATIENT)
Dept: INTERNAL MEDICINE | Facility: CLINIC | Age: 81
End: 2021-02-26
Payer: MEDICARE

## 2021-02-26 DIAGNOSIS — R82.90 ABNORMAL URINE ODOR: Primary | ICD-10-CM

## 2021-02-26 PROCEDURE — 99214 OFFICE O/P EST MOD 30 MIN: CPT | Mod: 95 | Performed by: INTERNAL MEDICINE

## 2021-02-26 NOTE — PROGRESS NOTES
Ghulam is a 80 year old who is being evaluated via a billable video visit.      How would you like to obtain your AVS? MyChart  If the video visit is dropped, the invitation should be resent by: Send to e-mail at: [email or web address]  Will anyone else be joining your video visit? wife      Video Start Time: 1017    Assessment & Plan     Abnormal urine odor  -Isolated symptom of lack of any other new urinary symptoms.  I do not recommend UA solely for odor.  -If he develops other symptoms contact us for evaluation        34 minutes spent on the date of the encounter doing chart review, history and exam, documentation and further activities as noted above       See Patient Instructions    No follow-ups on file.    Dereck Lomeli MD  [facility]   Ghulam is a 80 year old who presents for the following health issues  accompanied by his spouse:    HPI       Genitourinary - Male, odor of urine  Onset/Duration: a few days  Description:   Dysuria (painful urination): no  Hematuria (blood in urine): no  Frequency: no  Waking at night to urinate: YES  Hesitancy (delay in urine): YES  Retention (unable to empty): YES  Decrease in urinary flow: YES  Incontinence: YES a little dribble  Progression of Symptoms:  same  Accompanying Signs & Symptoms:  Fever: no  Back/Flank pain: no  Urethral discharge: no  Testicle lumps/masses/pain: no  Nausea and/or vomiting: no  Abdominal pain: no  History:   History of frequent UTI s: no  History of kidney stones: no  History of hernias: YES  Personal or Family history of Prostate problems: YES    Precipitating or alleviating factors: None  Therapies tried and outcome: increase fluid intake        Review of Systems         Objective           Vitals:  No vitals were obtained today due to virtual visit.    Physical Exam   GENERAL: alert and no distress  EYES: Eyes grossly normal to inspection.  No discharge or erythema, or obvious  scleral/conjunctival abnormalities.  RESP: No audible wheeze, cough, or visible cyanosis.  No visible retractions or increased work of breathing.    SKIN: Visible skin clear. No significant rash, abnormal pigmentation or lesions.                Video-Visit Details    Type of service:  Video Visit    Video End Time:10:44 AM    Originating Location (pt. Location): Home    Distant Location (provider location):  [facility]     Platform used for Video Visit: [employer]

## 2021-04-02 DIAGNOSIS — N32.81 OVERACTIVE BLADDER: Primary | ICD-10-CM

## 2021-04-02 RX ORDER — OXYBUTYNIN CHLORIDE 5 MG/1
5 TABLET, EXTENDED RELEASE ORAL DAILY
Qty: 60 TABLET | Refills: 5 | Status: SHIPPED | OUTPATIENT
Start: 2021-04-02 | End: 2021-12-07

## 2021-06-26 DIAGNOSIS — F51.04 CHRONIC INSOMNIA: ICD-10-CM

## 2021-06-29 RX ORDER — ZOLPIDEM TARTRATE 6.25 MG/1
TABLET, FILM COATED, EXTENDED RELEASE ORAL
Qty: 90 TABLET | Refills: 0 | Status: SHIPPED | OUTPATIENT
Start: 2021-06-29 | End: 2021-09-17

## 2021-06-30 ENCOUNTER — MYC MEDICAL ADVICE (OUTPATIENT)
Dept: INTERNAL MEDICINE | Facility: CLINIC | Age: 81
End: 2021-06-30

## 2021-06-30 ENCOUNTER — TRANSFERRED RECORDS (OUTPATIENT)
Dept: HEALTH INFORMATION MANAGEMENT | Facility: CLINIC | Age: 81
End: 2021-06-30

## 2021-07-01 NOTE — TELEPHONE ENCOUNTER
Provider please advise of mychart message. Wondering if thyroid should be rechecked.    Last TSH.     Lab Results   Component Value Date    TSH 2.07 08/16/2019

## 2021-07-12 ENCOUNTER — OFFICE VISIT (OUTPATIENT)
Dept: INTERNAL MEDICINE | Facility: CLINIC | Age: 81
End: 2021-07-12
Payer: MEDICARE

## 2021-07-12 VITALS
TEMPERATURE: 96.9 F | DIASTOLIC BLOOD PRESSURE: 72 MMHG | SYSTOLIC BLOOD PRESSURE: 128 MMHG | WEIGHT: 184.7 LBS | OXYGEN SATURATION: 96 % | BODY MASS INDEX: 23.7 KG/M2 | HEART RATE: 88 BPM | HEIGHT: 74 IN

## 2021-07-12 DIAGNOSIS — R53.83 OTHER FATIGUE: Primary | ICD-10-CM

## 2021-07-12 LAB
ALBUMIN SERPL-MCNC: 3.9 G/DL (ref 3.4–5)
ALP SERPL-CCNC: 103 U/L (ref 40–150)
ALT SERPL W P-5'-P-CCNC: 17 U/L (ref 0–70)
ANION GAP SERPL CALCULATED.3IONS-SCNC: 6 MMOL/L (ref 3–14)
AST SERPL W P-5'-P-CCNC: 17 U/L (ref 0–45)
BILIRUB SERPL-MCNC: 0.6 MG/DL (ref 0.2–1.3)
BUN SERPL-MCNC: 24 MG/DL (ref 7–30)
CALCIUM SERPL-MCNC: 9.6 MG/DL (ref 8.5–10.1)
CHLORIDE BLD-SCNC: 105 MMOL/L (ref 94–109)
CO2 SERPL-SCNC: 30 MMOL/L (ref 20–32)
CREAT SERPL-MCNC: 1.16 MG/DL (ref 0.66–1.25)
ERYTHROCYTE [DISTWIDTH] IN BLOOD BY AUTOMATED COUNT: 13 % (ref 10–15)
GFR SERPL CREATININE-BSD FRML MDRD: 60 ML/MIN/1.73M2
GLUCOSE BLD-MCNC: 105 MG/DL (ref 70–99)
HCT VFR BLD AUTO: 45.1 % (ref 40–53)
HGB BLD-MCNC: 15.2 G/DL (ref 13.3–17.7)
MCH RBC QN AUTO: 30.6 PG (ref 26.5–33)
MCHC RBC AUTO-ENTMCNC: 33.7 G/DL (ref 31.5–36.5)
MCV RBC AUTO: 91 FL (ref 78–100)
PLATELET # BLD AUTO: 197 10E3/UL (ref 150–450)
POTASSIUM BLD-SCNC: 3.8 MMOL/L (ref 3.4–5.3)
PROT SERPL-MCNC: 7.3 G/DL (ref 6.8–8.8)
RBC # BLD AUTO: 4.97 10E6/UL (ref 4.4–5.9)
SODIUM SERPL-SCNC: 141 MMOL/L (ref 133–144)
TSH SERPL DL<=0.005 MIU/L-ACNC: 1.85 MU/L (ref 0.4–4)
WBC # BLD AUTO: 7.9 10E3/UL (ref 4–11)

## 2021-07-12 PROCEDURE — 84443 ASSAY THYROID STIM HORMONE: CPT | Performed by: INTERNAL MEDICINE

## 2021-07-12 PROCEDURE — 80053 COMPREHEN METABOLIC PANEL: CPT | Performed by: INTERNAL MEDICINE

## 2021-07-12 PROCEDURE — 85027 COMPLETE CBC AUTOMATED: CPT | Performed by: INTERNAL MEDICINE

## 2021-07-12 PROCEDURE — 36415 COLL VENOUS BLD VENIPUNCTURE: CPT | Performed by: INTERNAL MEDICINE

## 2021-07-12 PROCEDURE — 99214 OFFICE O/P EST MOD 30 MIN: CPT | Performed by: INTERNAL MEDICINE

## 2021-07-12 ASSESSMENT — MIFFLIN-ST. JEOR: SCORE: 1622.54

## 2021-07-12 NOTE — PROGRESS NOTES
"  Assessment & Plan   Other fatigue  Parkinson's Disease  No obvious sign of any nonparkinsonian condition contributing to his fatigue.  We can do some initial lab work but I thi.nk this is related to his parkinsons  - CBC with platelets; Future  - TSH with free T4 reflex  - CBC with platelets  - Comprehensive metabolic panel (BMP + Alb, Alk Phos, ALT, AST, Total. Bili, TP)    Review of external notes as documented elsewhere in note  Review of the result(s) of each unique test - lab  Ordering of each unique test  Prescription drug management         See Patient Instructions    No follow-ups on file.    Dereck Lomeli MD  [facility]    Smitha Parmar is a 80 year old who presents for the following health issues  accompanied by his spouse:    HPI     Chief Complaint   Patient presents with     Fatigue     increasing fatigue over the past 6+ months.  Previously doing 3+ miles 2 x week, now unable to complet one mile     Musculoskeletal Problem     feels like feet are tight, feels like unable to fully extend, but denies pain           Review of Systems         Objective    /72   Pulse 88   Temp 96.9  F (36.1  C) (Temporal)   Ht 1.88 m (6' 2\")   Wt 83.8 kg (184 lb 11.2 oz)   SpO2 96%   BMI 23.71 kg/m    Body mass index is 23.71 kg/m .  Physical Exam   GENERAL APPEARANCE: alert and no distress  RESP: lungs clear to auscultation - no rales, rhonchi or wheezes  CV: regular rates and rhythm  NEURO: Significant resting tremors noted bilaterally.  Shuffling stuttering gait.  Cogwheel rigidity    Results for orders placed or performed in visit on 07/12/21 (from the past 24 hour(s))   TSH with free T4 reflex   Result Value Ref Range    TSH 1.85 0.40 - 4.00 mU/L   CBC with platelets   Result Value Ref Range    WBC Count 7.9 4.0 - 11.0 10e3/uL    RBC Count 4.97 4.40 - 5.90 10e6/uL    Hemoglobin 15.2 13.3 - 17.7 g/dL    Hematocrit 45.1 40.0 - 53.0 %    MCV 91 78 - 100 fL    MCH " 30.6 26.5 - 33.0 pg    MCHC 33.7 31.5 - 36.5 g/dL    RDW 13.0 10.0 - 15.0 %    Platelet Count 197 150 - 450 10e3/uL   Comprehensive metabolic panel (BMP + Alb, Alk Phos, ALT, AST, Total. Bili, TP)   Result Value Ref Range    Sodium 141 133 - 144 mmol/L    Potassium 3.8 3.4 - 5.3 mmol/L    Chloride 105 94 - 109 mmol/L    Carbon Dioxide (CO2) 30 20 - 32 mmol/L    Anion Gap 6 3 - 14 mmol/L    Urea Nitrogen 24 7 - 30 mg/dL    Creatinine 1.16 0.66 - 1.25 mg/dL    Calcium 9.6 8.5 - 10.1 mg/dL    Glucose 105 (H) 70 - 99 mg/dL    Alkaline Phosphatase 103 40 - 150 U/L    AST 17 0 - 45 U/L    ALT 17 0 - 70 U/L    Protein Total 7.3 6.8 - 8.8 g/dL    Albumin 3.9 3.4 - 5.0 g/dL    Bilirubin Total 0.6 0.2 - 1.3 mg/dL    GFR Estimate 60 (L) >60 mL/min/1.73m2

## 2021-09-04 ENCOUNTER — HEALTH MAINTENANCE LETTER (OUTPATIENT)
Age: 81
End: 2021-09-04

## 2021-12-07 DIAGNOSIS — N32.81 OVERACTIVE BLADDER: ICD-10-CM

## 2021-12-07 RX ORDER — OXYBUTYNIN CHLORIDE 5 MG/1
TABLET, EXTENDED RELEASE ORAL
Qty: 90 TABLET | Refills: 0 | Status: SHIPPED | OUTPATIENT
Start: 2021-12-07 | End: 2022-03-07

## 2021-12-23 ENCOUNTER — TELEPHONE (OUTPATIENT)
Dept: ONCOLOGY | Facility: OTHER | Age: 81
End: 2021-12-23

## 2021-12-23 DIAGNOSIS — Z80.42 FAMILY HX OF PROSTATE CANCER: ICD-10-CM

## 2021-12-23 DIAGNOSIS — N40.0 BENIGN PROSTATIC HYPERPLASIA WITHOUT LOWER URINARY TRACT SYMPTOMS: ICD-10-CM

## 2021-12-23 RX ORDER — DUTASTERIDE 0.5 MG/1
CAPSULE, LIQUID FILLED ORAL
Qty: 90 CAPSULE | Refills: 0 | Status: SHIPPED | OUTPATIENT
Start: 2021-12-23 | End: 2022-04-07

## 2021-12-23 NOTE — TELEPHONE ENCOUNTER
Patient declined consult at [facility]. Lives in [place] and is going to [place] per oncology  report.  Antionette Bush RN...........12/23/2021 10:39 AM

## 2022-01-03 ENCOUNTER — IMMUNIZATION (OUTPATIENT)
Dept: NURSING | Facility: CLINIC | Age: 82
End: 2022-01-03
Payer: MEDICARE

## 2022-01-03 PROCEDURE — 0004A PR COVID VAC PFIZER DIL RECON 30 MCG/0.3 ML IM: CPT

## 2022-01-03 PROCEDURE — 91300 PR COVID VAC PFIZER DIL RECON 30 MCG/0.3 ML IM: CPT

## 2022-02-15 ENCOUNTER — TRANSFERRED RECORDS (OUTPATIENT)
Dept: HEALTH INFORMATION MANAGEMENT | Facility: CLINIC | Age: 82
End: 2022-02-15
Payer: MEDICARE

## 2022-02-19 ENCOUNTER — HEALTH MAINTENANCE LETTER (OUTPATIENT)
Age: 82
End: 2022-02-19

## 2022-03-04 ENCOUNTER — TELEPHONE (OUTPATIENT)
Dept: UROLOGY | Facility: CLINIC | Age: 82
End: 2022-03-04
Payer: MEDICARE

## 2022-03-04 DIAGNOSIS — E78.5 HYPERLIPIDEMIA LDL GOAL <100: ICD-10-CM

## 2022-03-04 DIAGNOSIS — R93.1 ABNORMAL CARDIAC CT ANGIOGRAPHY: ICD-10-CM

## 2022-03-04 RX ORDER — ATORVASTATIN CALCIUM 10 MG/1
TABLET, FILM COATED ORAL
Qty: 90 TABLET | Refills: 0 | Status: SHIPPED | OUTPATIENT
Start: 2022-03-04 | End: 2022-08-03

## 2022-03-04 NOTE — TELEPHONE ENCOUNTER
Patient's pharmacy requesting refill on Tolterodine. He has an upcoming appointment will check if refill okay per MD.     Alessandra Singleton LPN

## 2022-03-07 DIAGNOSIS — N32.81 OVERACTIVE BLADDER: ICD-10-CM

## 2022-03-07 RX ORDER — OXYBUTYNIN CHLORIDE 5 MG/1
TABLET, EXTENDED RELEASE ORAL
Qty: 90 TABLET | Refills: 0 | Status: SHIPPED | OUTPATIENT
Start: 2022-03-07 | End: 2022-04-07

## 2022-03-16 ENCOUNTER — MYC MEDICAL ADVICE (OUTPATIENT)
Dept: INTERNAL MEDICINE | Facility: CLINIC | Age: 82
End: 2022-03-16
Payer: MEDICARE

## 2022-03-16 DIAGNOSIS — F51.04 CHRONIC INSOMNIA: ICD-10-CM

## 2022-03-17 RX ORDER — ZOLPIDEM TARTRATE 6.25 MG/1
TABLET, FILM COATED, EXTENDED RELEASE ORAL
Qty: 90 TABLET | Refills: 0 | Status: SHIPPED | OUTPATIENT
Start: 2022-03-30 | End: 2022-04-26

## 2022-03-25 DIAGNOSIS — N40.0 BENIGN PROSTATIC HYPERPLASIA WITHOUT LOWER URINARY TRACT SYMPTOMS: Primary | ICD-10-CM

## 2022-03-25 RX ORDER — DUTASTERIDE 0.5 MG/1
0.5 CAPSULE, LIQUID FILLED ORAL DAILY
Qty: 90 CAPSULE | Refills: 0 | Status: SHIPPED | OUTPATIENT
Start: 2022-03-25 | End: 2022-04-07

## 2022-04-04 ENCOUNTER — TELEPHONE (OUTPATIENT)
Dept: UROLOGY | Facility: CLINIC | Age: 82
End: 2022-04-04
Payer: MEDICARE

## 2022-04-04 NOTE — TELEPHONE ENCOUNTER
Returned call to Vonda; she's already picked-up cup for urine sample.  JUANI Díaz RN      [facility] Call Center    Phone Message    May a detailed message be left on voicemail: yes     Reason for Call: Patient has an in clinic appointment on 4/7/22 with Dr. Brantley.  Per Vonda, patient has parkinsons and has a difficult time giving urine samples are clinic location because of the bars in the bathroom.  Vonda is wondering, if patient does need to give a sample, can she  the container and do that at home?  If Vonda doesn't  her phone, she states to leave a message whether he needs to give a urine sample or not and whether she can  the container.  Please reach out to Vonda.    Action Taken: Message routed to:  [facility] Urology    Travel Screening: Not Applicable

## 2022-04-07 ENCOUNTER — OFFICE VISIT (OUTPATIENT)
Dept: UROLOGY | Facility: CLINIC | Age: 82
End: 2022-04-07
Payer: MEDICARE

## 2022-04-07 VITALS
BODY MASS INDEX: 23.74 KG/M2 | WEIGHT: 185 LBS | DIASTOLIC BLOOD PRESSURE: 70 MMHG | HEIGHT: 74 IN | SYSTOLIC BLOOD PRESSURE: 130 MMHG

## 2022-04-07 DIAGNOSIS — Z80.42 FAMILY HX OF PROSTATE CANCER: ICD-10-CM

## 2022-04-07 DIAGNOSIS — N32.81 OVERACTIVE BLADDER: ICD-10-CM

## 2022-04-07 DIAGNOSIS — N40.0 BENIGN PROSTATIC HYPERPLASIA WITHOUT LOWER URINARY TRACT SYMPTOMS: Primary | ICD-10-CM

## 2022-04-07 LAB — PSA SERPL-MCNC: 1 UG/L (ref 0–4)

## 2022-04-07 PROCEDURE — 84153 ASSAY OF PSA TOTAL: CPT | Performed by: UROLOGY

## 2022-04-07 PROCEDURE — 36415 COLL VENOUS BLD VENIPUNCTURE: CPT | Performed by: UROLOGY

## 2022-04-07 PROCEDURE — 99213 OFFICE O/P EST LOW 20 MIN: CPT | Performed by: UROLOGY

## 2022-04-07 RX ORDER — DUTASTERIDE 0.5 MG/1
0.5 CAPSULE, LIQUID FILLED ORAL DAILY
Qty: 90 CAPSULE | Refills: 3 | Status: SHIPPED | OUTPATIENT
Start: 2022-04-07 | End: 2023-04-07

## 2022-04-07 RX ORDER — OXYBUTYNIN CHLORIDE 5 MG/1
5 TABLET, EXTENDED RELEASE ORAL DAILY
Qty: 90 TABLET | Refills: 3 | Status: SHIPPED | OUTPATIENT
Start: 2022-04-07 | End: 2023-04-07

## 2022-04-07 ASSESSMENT — PAIN SCALES - GENERAL: PAINLEVEL: NO PAIN (0)

## 2022-04-07 NOTE — PROGRESS NOTES
"  CHIEF COMPLAINT   It was my pleasure to see Dylan Davila who is a 81 year old male for follow-up of family history of prostate cancer..      HPI   Dylan Davila is a very pleasant 81 year old male who presents with a history of prostate cancer in his family. Father  of disease and brother with prostate cancer. Mr. Davila has Parkinson's. Overactive bladder and on tolterodine. Has previously followed with Dr. Christiansen, last in 2020. On dutasteride and tolterodine.    PSA  2022 - 1.00  2020 - 0.96    PHYSICAL EXAM  Patient is a 81 year old  male   Vitals: Blood pressure 130/70, height 1.88 m (6' 2\"), weight 83.9 kg (185 lb).  General Appearance Adult: Body mass index is 23.75 kg/m .  Alert, no acute distress, oriented  Lungs: no respiratory distress, or pursed lip breathing  Abdomen: soft, nontender, no organomegaly or masses  Back: no CVAT  Neuro: Alert, oriented, speech and mentation normal; tremor noted  Psych: affect and mood normal    Outside and Past Medical records:  Review of the result(s) of each unique test - PSA    ASSESSMENT and PLAN  81 year old male with Parkinson's and a family history of prostate cancer. Also with BPH and overactive bladder, managed with dutasteride and tolterodine. Regarding his PSA and family history, we discussed that his PSA is quite low for his age, and that even given his family history, the risk of developing clinically significant prostate cancer at this point is very low. As such, would be reasonable to stop routine PSA screening.   We also discussed his urinary symptoms that are currently managed with dutasteride and tolterodine. We discussed the mechanisms of these medications and the role for each. We reviewed potential side effects and options for trial of stopping either or both of these medications. At this time, he would like to continue and will renew today.    - Continue dutasteride and tolterodine - prescriptions renewed today  - Will " discuss option of another PSA test and ongoing prescriptions with his primary care provider next year.  - Follow-up with urology as needed    25 minutes spent on the date of the encounter doing chart review, history and exam, documentation and further activities as noted above.    Luis Brantley MD  Urology  [facility]

## 2022-04-07 NOTE — LETTER
"2022      RE: Dylan Davila  [address]  [facility] [address]         CHIEF COMPLAINT   It was my pleasure to see Dylan Davila who is a 81 year old male for follow-up of family history of prostate cancer..      HPI   Dylan Davila is a very pleasant 81 year old male who presents with a history of prostate cancer in his family. Father  of disease and brother with prostate cancer. Mr. Davila has Parkinson's. Overactive bladder and on tolterodine. Has previously followed with Dr. Christiansen, last in 2020. On dutasteride and tolterodine.    PSA  2022 - 1.00  2020 - 0.96    PHYSICAL EXAM  Patient is a 81 year old  male   Vitals: Blood pressure 130/70, height 1.88 m (6' 2\"), weight 83.9 kg (185 lb).  General Appearance Adult: Body mass index is 23.75 kg/m .  Alert, no acute distress, oriented  Lungs: no respiratory distress, or pursed lip breathing  Abdomen: soft, nontender, no organomegaly or masses  Back: no CVAT  Neuro: Alert, oriented, speech and mentation normal; tremor noted  Psych: affect and mood normal    Outside and Past Medical records:  Review of the result(s) of each unique test - PSA    ASSESSMENT and PLAN  81 year old male with Parkinson's and a family history of prostate cancer. Also with BPH and overactive bladder, managed with dutasteride and tolterodine. Regarding his PSA and family history, we discussed that his PSA is quite low for his age, and that even given his family history, the risk of developing clinically significant prostate cancer at this point is very low. As such, would be reasonable to stop routine PSA screening.   We also discussed his urinary symptoms that are currently managed with dutasteride and tolterodine. We discussed the mechanisms of these medications and the role for each. We reviewed potential side effects and options for trial of stopping either or both of these medications. At this time, he would like to continue and will renew " today.    - Continue dutasteride and tolterodine - prescriptions renewed today  - Will discuss option of another PSA test and ongoing prescriptions with his primary care provider next year.  - Follow-up with urology as needed    25 minutes spent on the date of the encounter doing chart review, history and exam, documentation and further activities as noted above.    Luis Brantley MD  Urology  [facility]

## 2022-04-26 ENCOUNTER — OFFICE VISIT (OUTPATIENT)
Dept: INTERNAL MEDICINE | Facility: CLINIC | Age: 82
End: 2022-04-26
Payer: MEDICARE

## 2022-04-26 VITALS
HEART RATE: 76 BPM | OXYGEN SATURATION: 98 % | HEIGHT: 74 IN | TEMPERATURE: 97.9 F | BODY MASS INDEX: 23.34 KG/M2 | SYSTOLIC BLOOD PRESSURE: 128 MMHG | WEIGHT: 181.9 LBS | DIASTOLIC BLOOD PRESSURE: 66 MMHG

## 2022-04-26 DIAGNOSIS — F51.04 CHRONIC INSOMNIA: ICD-10-CM

## 2022-04-26 DIAGNOSIS — E78.5 HYPERLIPIDEMIA LDL GOAL <100: ICD-10-CM

## 2022-04-26 DIAGNOSIS — G20.A1 PARKINSON'S DISEASE (H): ICD-10-CM

## 2022-04-26 DIAGNOSIS — Z00.00 ENCOUNTER FOR MEDICARE ANNUAL WELLNESS EXAM: Primary | ICD-10-CM

## 2022-04-26 DIAGNOSIS — Z13.1 SCREENING FOR DIABETES MELLITUS: ICD-10-CM

## 2022-04-26 LAB
ANION GAP SERPL CALCULATED.3IONS-SCNC: 4 MMOL/L (ref 3–14)
BUN SERPL-MCNC: 25 MG/DL (ref 7–30)
CALCIUM SERPL-MCNC: 9.5 MG/DL (ref 8.5–10.1)
CHLORIDE BLD-SCNC: 105 MMOL/L (ref 94–109)
CHOLEST SERPL-MCNC: 110 MG/DL
CO2 SERPL-SCNC: 30 MMOL/L (ref 20–32)
CREAT SERPL-MCNC: 0.85 MG/DL (ref 0.66–1.25)
FASTING STATUS PATIENT QL REPORTED: NO
GFR SERPL CREATININE-BSD FRML MDRD: 88 ML/MIN/1.73M2
GLUCOSE BLD-MCNC: 101 MG/DL (ref 70–99)
HDLC SERPL-MCNC: 51 MG/DL
LDLC SERPL CALC-MCNC: 47 MG/DL
NONHDLC SERPL-MCNC: 59 MG/DL
POTASSIUM BLD-SCNC: 4.1 MMOL/L (ref 3.4–5.3)
SODIUM SERPL-SCNC: 139 MMOL/L (ref 133–144)
TRIGL SERPL-MCNC: 62 MG/DL

## 2022-04-26 PROCEDURE — 99214 OFFICE O/P EST MOD 30 MIN: CPT | Mod: 25 | Performed by: INTERNAL MEDICINE

## 2022-04-26 PROCEDURE — G0439 PPPS, SUBSEQ VISIT: HCPCS | Performed by: INTERNAL MEDICINE

## 2022-04-26 PROCEDURE — 36415 COLL VENOUS BLD VENIPUNCTURE: CPT | Performed by: INTERNAL MEDICINE

## 2022-04-26 PROCEDURE — 80048 BASIC METABOLIC PNL TOTAL CA: CPT | Performed by: INTERNAL MEDICINE

## 2022-04-26 PROCEDURE — 80061 LIPID PANEL: CPT | Performed by: INTERNAL MEDICINE

## 2022-04-26 RX ORDER — ZOLPIDEM TARTRATE 6.25 MG/1
TABLET, FILM COATED, EXTENDED RELEASE ORAL
Qty: 90 TABLET | Refills: 0 | Status: SHIPPED | OUTPATIENT
Start: 2022-06-30 | End: 2022-06-30

## 2022-04-26 ASSESSMENT — ENCOUNTER SYMPTOMS
FREQUENCY: 1
CONSTIPATION: 1
WEAKNESS: 1
JOINT SWELLING: 1

## 2022-04-26 ASSESSMENT — ACTIVITIES OF DAILY LIVING (ADL)
CURRENT_FUNCTION: LAUNDRY REQUIRES ASSISTANCE
CURRENT_FUNCTION: TRANSPORTATION REQUIRES ASSISTANCE
CURRENT_FUNCTION: BATHING REQUIRES ASSISTANCE
CURRENT_FUNCTION: MONEY MANAGEMENT REQUIRES ASSISTANCE
CURRENT_FUNCTION: PREPARING MEALS REQUIRES ASSISTANCE

## 2022-04-26 NOTE — PATIENT INSTRUCTIONS
Patient Education   Personalized Prevention Plan  You are due for the preventive services outlined below.  Your care team is available to assist you in scheduling these services.  If you have already completed any of these items, please share that information with your care team to update in your medical record.  Health Maintenance Due   Topic Date Due    Zoster (Shingles) Vaccine (2 of 3) 11/26/2009    Cholesterol Lab  12/09/2021    FALL RISK ASSESSMENT  12/31/2021     Your Health Risk Assessment indicates you feel you are not in good health    A healthy lifestyle helps keep the body fit and the mind alert. It helps protect you from disease, helps you fight disease, and helps prevent chronic disease (disease that doesn't go away) from getting worse. This is important as you get older and begin to notice twinges in muscles and joints and a decline in the strength and stamina you once took for granted. A healthy lifestyle includes good healthcare, good nutrition, weight control, recreation, and regular exercise. Avoid harmful substances and do what you can to keep safe. Another part of a healthy lifestyle is stay mentally active and socially involved.    Good healthcare   Have a wellness visit every year.   If you have new symptoms, let us know right away. Don't wait until the next checkup.   Take medicines exactly as prescribed and keep your medicines in a safe place. Tell us if your medicine causes problems.   Healthy diet and weight control   Eat 3 or 4 small, nutritious, low-fat, high-fiber meals a day. Include a variety of fruits, vegetables, and whole-grain foods.   Make sure you get enough calcium in your diet. Calcium, vitamin D, and exercise help prevent osteoporosis (bone thinning).   If you live alone, try eating with others when you can. That way you get a good meal and have company while you eat it.   Try to keep a healthy weight. If you eat more calories than your body uses for energy, it will be  stored as fat and you will gain weight.     Recreation   Recreation is not limited to sports and team events. It includes any activity that provides relaxation, interest, enjoyment, and exercise. Recreation provides an outlet for physical, mental, and social energy. It can give a sense of worth and achievement. It can help you stay healthy.    Mental Exercise and Social Involvement  Mental and emotional health is as important as physical health. Keep in touch with friends and family. Stay as active as possible. Continue to learn and challenge yourself.   Things you can do to stay mentally active are:  Learn something new, like a foreign language or musical instrument.   Play SCRABBLE or do crossword puzzles. If you cannot find people to play these games with you at home, you can play them with others on your computer through the Internet.   Join a games club--anything from card games to chess or checkers or lawn bowling.   Start a new hobby.   Go back to school.   Volunteer.   Read.   Keep up with world events.  Activities of Daily Living    Your Health Risk Assessment indicates you have difficulties with activities of daily living such as housework, bathing, preparing meals, taking medication, etc. Please make a follow up appointment for us to address this issue in more detail.    Signs of Hearing Loss      Hearing much better with one ear can be a sign of hearing loss.   Hearing loss is a problem shared by many people. In fact, it is one of the most common health problems, particularly as people age. Most people age 65 and older have some hearing loss. By age 80, almost everyone does. Hearing loss often occurs slowly over the years. So you may not realize your hearing has gotten worse.  Have your hearing checked  Call your healthcare provider if you:  Have to strain to hear normal conversation  Have to watch other people s faces very carefully to follow what they re saying  Need to ask people to repeat what  they ve said  Often misunderstand what people are saying  Turn the volume of the television or radio up so high that others complain  Feel that people are mumbling when they re talking to you  Find that the effort to hear leaves you feeling tired and irritated  Notice, when using the phone, that you hear better with one ear than the other  [employer] last reviewed this educational content on 1/1/2020 2000-2021 [employer]. All rights reserved. This information is not intended as a substitute for professional medical care. Always follow your healthcare professional's instructions.          Urinary Incontinence (Male)    Urinary incontinence means not being able to control the release of urine from the bladder.   Causes  Common causes of urinary incontinence in men include:  Infection  Certain medicines  Aging  Poor pelvic muscle tone  Bladder spasms  Obesity  Trouble urinating and fully emptying the bladder (urinary retention)  Other things that can cause incontinence are:   Nervous system diseases  Diabetes  Sleep apnea  Urinary tract infections  Prostate surgery  Pelvic injury  Constipation and smoking have also been identified as risk factors.   Symptoms  Urge incontinence (overactive bladder). This is a sudden urge to urinate. It occurs even though there may not be much urine in the bladder. The need to urinate often during the night is common. It's due to bladder spasms.  Stress incontinence. This is urine leakage that you can't control. It can occur with sneezing, coughing, and other actions that put stress on the bladder.    Treatment  Treatment depends on what is causing the condition. Bladder infections are treated with antibiotics. Urinary retention is treated with a bladder catheter.   Home care  Follow these guidelines when caring for yourself at home:  Don't have any foods and drinks that may irritate the bladder. This includes:  Chocolate  Alcohol  Caffeine  Carbonated drinks  Acidic  fruits and juices  Limit fluids to 6 to 8 cups a day.  Lose weight if you are overweight. This will reduce your symptoms.  If advised, do regular pelvic muscle-strengthening exercises such as Kegel exercises.  If needed, wear absorbent pads to catch urine. Change the pads often. This is for good hygiene and to prevent skin and bladder infections.  Bathe daily for good hygiene.  If an antibiotic was prescribed to treat a bladder infection, take it until it's finished. Keep taking it even if you are feeling better. This is to make sure your infection has cleared.  If a catheter was left in place, keep bacteria from getting into the collection bag. Don't disconnect the catheter from the collection bag.  Use a leg band to secure the catheter drainage tube, so it does not pull on the catheter. Drain the collection bag when it becomes full. To do this, use the drain spout at the bottom of the bag. Don't disconnect the bag from the catheter.  Don't pull on or try to remove a catheter. The catheter must be removed by a healthcare provider.  If you smoke, stop. Ask your provider for help if you can't do this on your own.  Follow-up care  Follow up with your healthcare provider, or as advised.  When to get medical advice  Call your healthcare provider right away if any of these occur:  Fever over 100.4 F (38 C), or as directed by your provider  Bladder pain or fullness  Belly swelling, nausea, or vomiting  Back pain  Weakness, dizziness, or fainting  If a catheter was left in place, return if:  The catheter falls out  The catheter stops draining for 6 hours  Your urine gets cloudy or smells bad  [employer] last reviewed this educational content on 1/1/2020 2000-2021 [employer]. All rights reserved. This information is not intended as a substitute for professional medical care. Always follow your healthcare professional's instructions.        Your Health Risk Assessment indicates you feel you are not in good  emotional health.    Recreation   Recreation is not limited to sports and team events. It includes any activity that provides relaxation, interest, enjoyment, and exercise. Recreation provides an outlet for physical, mental, and social energy. It can give a sense of worth and achievement. It can help you stay healthy.    Mental Exercise and Social Involvement  Mental and emotional health is as important as physical health. Keep in touch with friends and family. Stay as active as possible. Continue to learn and challenge yourself.   Things you can do to stay mentally active are:  Learn something new, like a foreign language or musical instrument.   Play SCRABBLE or do crossword puzzles. If you cannot find people to play these games with you at home, you can play them with others on your computer through the Internet.   Join a games club--anything from card games to chess or checkers or lawn bowling.   Start a new hobby.   Go back to school.   Volunteer.   Read.   Keep up with world events.    Preventing Falls at Home  A person can fall for many reasons. Older adults may fall because reaction time slows down as we age. Your muscles and joints may get stiff, weak, or less flexible because of illness, medicines, or a physical condition.   Other health problems that make falls more likely include:   Arthritis  Dizziness or lightheadedness when you stand up (orthostatic hypotension)  History of a stroke  Dizziness  Anemia  Certain medicines taken for mental illness or to control blood pressure.  Problems with balance or gait  Bladder or urinary problems  History of falling  Changes in vision (vision impairment)  Changes in thinking skills and memory (cognitive impairment)  Injuries from a fall can include serious injuries such as broken bones, dislocated joints, internal bleeding and cuts. Injuries like these can limit your independence.   Prevention tips  To help prevent falls and fall-related injuries, follow the tips  below.    Floors  To make floors safer:   Put nonskid pads under area rugs.  Remove small rugs.  Replace worn floor coverings.  Tack carpets firmly to each step on carpeted stairs. Put nonskid strips on the edges of uncarpeted stairs.  Keep floors and stairs free of clutter and cords.  Arrange furniture so there are clear pathways.  Clean up any spills right away.  Bathrooms    To make bathrooms safer:   Install grab bars in the tub or shower.  Apply nonskid strips or put a nonskid rubber mat in the tub or shower.  Sit on a bath chair to bathe.  Use bathmats with nonskid backing.  Lighting  To improve visibility in your home:    Keep a flashlight in each room. Or put a lamp next to the bed within easy reach.  Put nightlights in the bedrooms, hallways, kitchen, and bathrooms.  Make sure all stairways have good lighting.  Take your time when going up and down stairs.  Put handrails on both sides of stairs and in walkways for more support. To prevent injury to your wrist or arm, don t use handrails to pull yourself up.  Install grab bars to pull yourself up.  Move or rearrange items that you use often. This will make them easier to find or reach.  Look at your home to find any safety hazards. Especially look at doorways, walkways, and the driveway. Remove or repair any safety problems that you find.  Other changes to make  Look around to find any safety hazards. Look closely at doorways, walkways, and the driveway. Remove or repair any safety problems that you find.  Wear shoes that fit well.  Take your time when going up and down stairs.  Put handrails on both sides of stairs and in walkways for more support. To prevent injury to your wrist or arm, don t use handrails to pull yourself up.  Install grab bars wherever needed to pull yourself up.  Arrange items that you use often. This will make them easier to find or reach.    [employer] last reviewed this educational content on 3/1/2020    3808-4882 [employer]. All rights reserved. This information is not intended as a substitute for professional medical care. Always follow your healthcare professional's instructions.

## 2022-04-26 NOTE — PROGRESS NOTES
"SUBJECTIVE:   Dylan Davila is a 81 year old male who presents for Preventive Visit.    Patient has been advised of split billing requirements and indicates understanding: Yes  Are you in the first 12 months of your Medicare coverage?  No    Healthy Habits:     In general, how would you rate your overall health?  Poor    Frequency of exercise:  2-3 days/week    Duration of exercise:  Less than 15 minutes    Do you usually eat at least 4 servings of fruit and vegetables a day, include whole grains    & fiber and avoid regularly eating high fat or \"junk\" foods?  Yes    Taking medications regularly:  Yes    Medication side effects:  Not applicable    Ability to successfully perform activities of daily living:  Transportation requires assistance, preparing meals requires assistance, bathing requires assistance, laundry requires assistance and money management requires assistance    Home Safety:  No safety concerns identified    Hearing Impairment:  Need to ask people to speak up or repeat themselves and difficulty understanding soft or whispered speech    In the past 6 months, have you been bothered by leaking of urine? Yes    In general, how would you rate your overall mental or emotional health?  Fair      PHQ-2 Total Score: 1    Additional concerns today:  Yes    Do you feel safe in your environment? YES    Have you ever done Advance Care Planning? (For example, a Health Directive, POLST, or a discussion with a medical provider or your loved ones about your wishes): No, advance care planning information given to patient to review.  Patient plans to discuss their wishes with loved ones or provider.         Fall risk  Fallen 2 or more times in the past year?: Yes  Any fall with injury in the past year?: No  Timed Up and Go Test (>13.5 is fall risk; contact physician) : 13    Cognitive Screening   1) Repeat 3 items (Leader, Season, Table)    2) Clock draw: NORMAL  3) 3 item recall: Recalls 2 objects   Results: " NORMAL clock, 1-2 items recalled: COGNITIVE IMPAIRMENT LESS LIKELY    Mini-CogTM Copyright PATY [employer]. Licensed by the author for use in [facility]; reprinted with permission (brant@[facility]). All rights reserved.      Do you have sleep apnea, excessive snoring or daytime drowsiness?: no    Reviewed and updated as needed this visit by clinical staff   Tobacco  Allergies  Meds                Reviewed and updated as needed this visit by Provider                   Social History     Tobacco Use     Smoking status: Former Smoker     Packs/day: 0.00     Years: 3.00     Pack years: 0.00     Types: Pipe     Quit date: 1975     Years since quittin.4     Smokeless tobacco: Never Used   Substance Use Topics     Alcohol use: Yes     Comment: one beer or glass of wine 2x/month         Alcohol Use 2022   Prescreen: >3 drinks/day or >7 drinks/week? No   Prescreen: >3 drinks/day or >7 drinks/week? -         Current providers sharing in care for this patient include:   Patient Care Team:  Dereck Lomeli MD as PCP - General (Internal Medicine)  Dereck Lomeli MD as Assigned PCP  Frandy Christiansen MD as Assigned Surgical Provider  Luis Brantley MD as MD (Urology)  Luis Brantley MD as Assigned Cancer Care Provider    The following health maintenance items are reviewed in Epic and correct as of today:  Health Maintenance Due   Topic Date Due     ZOSTER IMMUNIZATION (2 of 3) 2009     COVID-19 Vaccine (4 - Booster for Pfizer series) 2022     Labs reviewed in EPIC          Review of Systems   HENT: Positive for congestion and hearing loss.    Gastrointestinal: Positive for constipation.   Genitourinary: Positive for frequency, impotence and urgency. Negative for penile discharge.   Musculoskeletal: Positive for joint swelling.   Skin: Positive for rash.   Neurological: Positive for weakness.         OBJECTIVE:   /66   Pulse 76   Temp 97.9  F (36.6  C)  "(Temporal)   Ht 1.88 m (6' 2\")   Wt 82.5 kg (181 lb 14.4 oz)   SpO2 98%   BMI 23.35 kg/m   Estimated body mass index is 23.35 kg/m  as calculated from the following:    Height as of this encounter: 1.88 m (6' 2\").    Weight as of this encounter: 82.5 kg (181 lb 14.4 oz).  Physical Exam  GENERAL: alert and no distress  EYES: Eyes grossly normal to inspection, PERRL and conjunctivae and sclerae normal  NECK: no adenopathy, no asymmetry, masses, or scars and thyroid normal to palpation  RESP: lungs clear to auscultation - no rales, rhonchi or wheezes  CV: regular rate and rhythm, normal S1 S2, no S3 or S4, no murmur, click or rub, no peripheral edema and peripheral pulses strong  ABDOMEN: soft, nontender, no hepatosplenomegaly, no masses and bowel sounds normal  MS: no gross musculoskeletal defects noted, no edema  SKIN: no suspicious lesions or rashes  PSYCH: mentation appears normal, affect normal/bright  Neuro: considerable bradykinesia w/movement. Gait slow, shuffling.     Results for orders placed or performed in visit on 04/26/22   Lipid panel reflex to direct LDL Fasting     Status: None   Result Value Ref Range    Cholesterol 110 <200 mg/dL    Triglycerides 62 <150 mg/dL    Direct Measure HDL 51 >=40 mg/dL    LDL Cholesterol Calculated 47 <=100 mg/dL    Non HDL Cholesterol 59 <130 mg/dL    Patient Fasting > 8hrs? No     Narrative    Cholesterol  Desirable:  <200 mg/dL    Triglycerides  Normal:  Less than 150 mg/dL  Borderline High:  150-199 mg/dL  High:  200-499 mg/dL  Very High:  Greater than or equal to 500 mg/dL    Direct Measure HDL  Female:  Greater than or equal to 50 mg/dL   Male:  Greater than or equal to 40 mg/dL    LDL Cholesterol  Desirable:  <100mg/dL  Above Desirable:  100-129 mg/dL   Borderline High:  130-159 mg/dL   High:  160-189 mg/dL   Very High:  >= 190 mg/dL    Non HDL Cholesterol  Desirable:  130 mg/dL  Above Desirable:  130-159 mg/dL  Borderline High:  160-189 mg/dL  High:  190-219 " "mg/dL  Very High:  Greater than or equal to 220 mg/dL   Basic metabolic panel  (Ca, Cl, CO2, Creat, Gluc, K, Na, BUN)     Status: Abnormal   Result Value Ref Range    Sodium 139 133 - 144 mmol/L    Potassium 4.1 3.4 - 5.3 mmol/L    Chloride 105 94 - 109 mmol/L    Carbon Dioxide (CO2) 30 20 - 32 mmol/L    Anion Gap 4 3 - 14 mmol/L    Urea Nitrogen 25 7 - 30 mg/dL    Creatinine 0.85 0.66 - 1.25 mg/dL    Calcium 9.5 8.5 - 10.1 mg/dL    Glucose 101 (H) 70 - 99 mg/dL    GFR Estimate 88 >60 mL/min/1.73m2        ASSESSMENT / PLAN:       ICD-10-CM    1. Encounter for Medicare annual wellness exam  Z00.00    2. Hyperlipidemia LDL goal <100  E78.5 Lipid panel reflex to direct LDL Fasting     Lipid panel reflex to direct LDL Fasting   3. Parkinson's disease (H)  G20 Adult Neurology  Referral   4. Screening for diabetes mellitus  Z13.1 Basic metabolic panel  (Ca, Cl, CO2, Creat, Gluc, K, Na, BUN)     Basic metabolic panel  (Ca, Cl, CO2, Creat, Gluc, K, Na, BUN)   5. Chronic insomnia  F51.04 zolpidem ER (AMBIEN CR) 6.25 MG CR tablet     Updated screening, immunizations, prevention.  Please see health maintenance list, care gaps   ambien use isn't ideal but we've been unsuccessful at finding anything else that helps him stay asleep.     Patient has been advised of split billing requirements and indicates understanding: Yes    COUNSELING:  Reviewed preventive health counseling, as reflected in patient instructions    Estimated body mass index is 23.35 kg/m  as calculated from the following:    Height as of this encounter: 1.88 m (6' 2\").    Weight as of this encounter: 82.5 kg (181 lb 14.4 oz).        He reports that he quit smoking about 47 years ago. His smoking use included pipe. He smoked 0.00 packs per day for 3.00 years. He has never used smokeless tobacco.      Appropriate preventive services were discussed with this patient, including applicable screening as appropriate for cardiovascular disease, diabetes, " osteopenia/osteoporosis, and glaucoma.  As appropriate for age/gender, discussed screening for colorectal cancer, prostate cancer, breast cancer, and cervical cancer. Checklist reviewing preventive services available has been given to the patient.    Reviewed patients plan of care and provided an AVS. The Basic Care Plan (routine screening as documented in Health Maintenance) for Dylan meets the Care Plan requirement. This Care Plan has been established and reviewed with the Patient.    Counseling Resources:  ATP IV Guidelines  Pooled Cohorts Equation Calculator  Breast Cancer Risk Calculator  Breast Cancer: Medication to Reduce Risk  FRAX Risk Assessment  ICSI Preventive Guidelines  Dietary Guidelines for Americans, 2010  "[employer]'s MyPlate  ASA Prophylaxis  Lung CA Screening    Dereck Lomeli MD  [facility]    Identified Health Risks:    The patient was provided with suggestions to help him develop a healthy physical lifestyle.  The patient reports that he has difficulty with activities of daily living. I have asked that the patient make a follow up appointment in 26 weeks where this issue will be further evaluated and addressed.  The patient was provided with written information regarding signs of hearing loss.  Information on urinary incontinence and treatment options given to patient.  The patient was provided with suggestions to help him develop a healthy emotional lifestyle.  He is at risk for falling and has been provided with information to reduce the risk of falling at home.

## 2022-06-27 DIAGNOSIS — F51.04 CHRONIC INSOMNIA: ICD-10-CM

## 2022-06-28 NOTE — TELEPHONE ENCOUNTER
Pharmacy called requesting ambien to be sent electronically to pharmacy  Per pharmacist patient received local print script copy at last visit.   Patient is unable to bring script to pharmacy as they do not have transport at this time.     Pharmacist/patient requesting ambien to be prescribed electronically to pharmacy and medication will be mailed to patient. Patient will shred local print script. PCP please advise- pended new script.           Dexter Greene RN  [facility]

## 2022-06-30 RX ORDER — ZOLPIDEM TARTRATE 6.25 MG/1
TABLET, FILM COATED, EXTENDED RELEASE ORAL
Qty: 90 TABLET | Refills: 0 | Status: SHIPPED | OUTPATIENT
Start: 2022-06-30 | End: 2022-09-13

## 2022-08-02 DIAGNOSIS — E78.5 HYPERLIPIDEMIA LDL GOAL <100: ICD-10-CM

## 2022-08-02 DIAGNOSIS — R93.1 ABNORMAL CARDIAC CT ANGIOGRAPHY: ICD-10-CM

## 2022-08-03 RX ORDER — ATORVASTATIN CALCIUM 10 MG/1
TABLET, FILM COATED ORAL
Qty: 90 TABLET | Refills: 2 | Status: SHIPPED | OUTPATIENT
Start: 2022-08-03 | End: 2023-05-08

## 2022-08-03 NOTE — TELEPHONE ENCOUNTER
Prescription approved per [facility] Refill Protocol.  Frank Swartz RN  [facility] Triage Nurse

## 2022-09-13 DIAGNOSIS — F51.04 CHRONIC INSOMNIA: ICD-10-CM

## 2022-09-13 RX ORDER — ZOLPIDEM TARTRATE 6.25 MG/1
TABLET, FILM COATED, EXTENDED RELEASE ORAL
Qty: 90 TABLET | Refills: 0 | Status: SHIPPED | OUTPATIENT
Start: 2022-09-28 | End: 2022-12-21

## 2022-09-13 NOTE — TELEPHONE ENCOUNTER
rx filled 6/30.   rx should be good thru 9/28, rx ok'd but not to be filled until 90 d from last rx.

## 2022-10-16 ENCOUNTER — HEALTH MAINTENANCE LETTER (OUTPATIENT)
Age: 82
End: 2022-10-16

## 2022-11-04 ENCOUNTER — OFFICE VISIT (OUTPATIENT)
Dept: INTERNAL MEDICINE | Facility: CLINIC | Age: 82
End: 2022-11-04
Payer: MEDICARE

## 2022-11-04 VITALS
BODY MASS INDEX: 23.37 KG/M2 | DIASTOLIC BLOOD PRESSURE: 64 MMHG | TEMPERATURE: 97.8 F | HEIGHT: 74 IN | HEART RATE: 76 BPM | SYSTOLIC BLOOD PRESSURE: 110 MMHG | WEIGHT: 182.1 LBS | OXYGEN SATURATION: 98 %

## 2022-11-04 DIAGNOSIS — Z23 NEED FOR INFLUENZA VACCINATION: ICD-10-CM

## 2022-11-04 DIAGNOSIS — Z23 NEED FOR PROPHYLACTIC VACCINATION AND INOCULATION AGAINST INFLUENZA: ICD-10-CM

## 2022-11-04 DIAGNOSIS — H61.21 IMPACTED CERUMEN OF RIGHT EAR: Primary | ICD-10-CM

## 2022-11-04 PROCEDURE — 90662 IIV NO PRSV INCREASED AG IM: CPT | Performed by: INTERNAL MEDICINE

## 2022-11-04 PROCEDURE — 69210 REMOVE IMPACTED EAR WAX UNI: CPT | Mod: RT | Performed by: INTERNAL MEDICINE

## 2022-11-04 PROCEDURE — G0008 ADMIN INFLUENZA VIRUS VAC: HCPCS | Performed by: INTERNAL MEDICINE

## 2022-11-04 NOTE — PROGRESS NOTES
"  Assessment & Plan     Impacted cerumen of right ear  Manual disimpaction with curette performed by MD followed by irrigation for complete removal of any excess cerumen by RN.   - REMOVE IMPACTED CERUMEN             See Patient Instructions    No follow-ups on file.    Dereck Lomeli MD  [facility] JING Parmar is a 81 year old, presenting for the following health issues:  Ear Problem and Imm/Inj (Flu Shot)      History of Present Illness       Reason for visit:  Flu shot and ear wax removal    He eats 2-3 servings of fruits and vegetables daily.He consumes 0 sweetened beverage(s) daily.He exercises with enough effort to increase his heart rate 9 or less minutes per day.  He exercises with enough effort to increase his heart rate 3 or less days per week.   He is taking medications regularly.           Review of Systems         Objective    /64   Pulse 76   Temp 97.8  F (36.6  C) (Tympanic)   Ht 1.88 m (6' 2\")   Wt 82.6 kg (182 lb 1.6 oz)   SpO2 98%   BMI 23.38 kg/m    Body mass index is 23.38 kg/m .  Physical Exam   GENERAL: alert, no distress and over weight  HENT: normal cephalic/atraumatic, right ear: occluded with wax and left ear: minimally occluded with wax                    "

## 2022-11-04 NOTE — PROGRESS NOTES
Patient identified using two patient identifiers.  Ear exam showing wax occlusion completed by provider.  Solution: warm water was placed in the right ear(s) via irrigation tool: gina Dudley CMA  .

## 2022-12-21 ENCOUNTER — MYC REFILL (OUTPATIENT)
Dept: INTERNAL MEDICINE | Facility: CLINIC | Age: 82
End: 2022-12-21

## 2022-12-21 DIAGNOSIS — F51.04 CHRONIC INSOMNIA: ICD-10-CM

## 2022-12-23 RX ORDER — ZOLPIDEM TARTRATE 6.25 MG/1
TABLET, FILM COATED, EXTENDED RELEASE ORAL
Qty: 90 TABLET | Refills: 0 | Status: SHIPPED | OUTPATIENT
Start: 2022-12-23 | End: 2023-03-22

## 2023-03-21 DIAGNOSIS — F51.04 CHRONIC INSOMNIA: ICD-10-CM

## 2023-03-22 RX ORDER — ZOLPIDEM TARTRATE 6.25 MG/1
TABLET, FILM COATED, EXTENDED RELEASE ORAL
Qty: 90 TABLET | Refills: 1 | Status: SHIPPED | OUTPATIENT
Start: 2023-03-22 | End: 2023-09-12

## 2023-03-31 DIAGNOSIS — N40.0 BENIGN PROSTATIC HYPERPLASIA WITHOUT LOWER URINARY TRACT SYMPTOMS: ICD-10-CM

## 2023-03-31 DIAGNOSIS — N32.81 OVERACTIVE BLADDER: ICD-10-CM

## 2023-04-07 DIAGNOSIS — N32.81 OAB (OVERACTIVE BLADDER): ICD-10-CM

## 2023-04-07 DIAGNOSIS — N40.0 BPH (BENIGN PROSTATIC HYPERPLASIA): Primary | ICD-10-CM

## 2023-04-07 RX ORDER — DUTASTERIDE 0.5 MG/1
0.5 CAPSULE, LIQUID FILLED ORAL DAILY
Qty: 90 CAPSULE | Refills: 0 | Status: SHIPPED | OUTPATIENT
Start: 2023-04-07 | End: 2023-06-01

## 2023-04-07 RX ORDER — OXYBUTYNIN CHLORIDE 5 MG/1
5 TABLET, EXTENDED RELEASE ORAL DAILY
Qty: 90 TABLET | Refills: 0 | Status: SHIPPED | OUTPATIENT
Start: 2023-04-07 | End: 2023-06-01

## 2023-04-07 RX ORDER — OXYBUTYNIN CHLORIDE 5 MG/1
TABLET, EXTENDED RELEASE ORAL
Qty: 90 TABLET | Refills: 0 | Status: SHIPPED | OUTPATIENT
Start: 2023-04-07 | End: 2023-06-01

## 2023-04-07 RX ORDER — DUTASTERIDE 0.5 MG/1
CAPSULE, LIQUID FILLED ORAL
Qty: 90 CAPSULE | Refills: 0 | Status: SHIPPED | OUTPATIENT
Start: 2023-04-07 | End: 2023-06-01

## 2023-04-20 ENCOUNTER — PATIENT OUTREACH (OUTPATIENT)
Dept: CARE COORDINATION | Facility: CLINIC | Age: 83
End: 2023-04-20
Payer: MEDICARE

## 2023-05-08 DIAGNOSIS — R93.1 ABNORMAL CARDIAC CT ANGIOGRAPHY: ICD-10-CM

## 2023-05-08 DIAGNOSIS — E78.5 HYPERLIPIDEMIA LDL GOAL <100: ICD-10-CM

## 2023-05-08 RX ORDER — ATORVASTATIN CALCIUM 10 MG/1
TABLET, FILM COATED ORAL
Qty: 90 TABLET | Refills: 0 | Status: SHIPPED | OUTPATIENT
Start: 2023-05-08 | End: 2023-06-01

## 2023-06-01 ENCOUNTER — MYC MEDICAL ADVICE (OUTPATIENT)
Dept: INTERNAL MEDICINE | Facility: CLINIC | Age: 83
End: 2023-06-01

## 2023-06-01 ENCOUNTER — OFFICE VISIT (OUTPATIENT)
Dept: INTERNAL MEDICINE | Facility: CLINIC | Age: 83
End: 2023-06-01
Payer: MEDICARE

## 2023-06-01 VITALS
BODY MASS INDEX: 23.77 KG/M2 | OXYGEN SATURATION: 97 % | RESPIRATION RATE: 18 BRPM | HEIGHT: 74 IN | SYSTOLIC BLOOD PRESSURE: 112 MMHG | TEMPERATURE: 97.8 F | DIASTOLIC BLOOD PRESSURE: 76 MMHG | HEART RATE: 84 BPM | WEIGHT: 185.2 LBS

## 2023-06-01 DIAGNOSIS — R93.1 ABNORMAL CARDIAC CT ANGIOGRAPHY: ICD-10-CM

## 2023-06-01 DIAGNOSIS — N40.1 BENIGN PROSTATIC HYPERPLASIA WITH LOWER URINARY TRACT SYMPTOMS, SYMPTOM DETAILS UNSPECIFIED: ICD-10-CM

## 2023-06-01 DIAGNOSIS — N32.81 OAB (OVERACTIVE BLADDER): ICD-10-CM

## 2023-06-01 DIAGNOSIS — Z00.00 ENCOUNTER FOR MEDICARE ANNUAL WELLNESS EXAM: Primary | ICD-10-CM

## 2023-06-01 DIAGNOSIS — F13.20 SEDATIVE, HYPNOTIC OR ANXIOLYTIC DEPENDENCE (H): ICD-10-CM

## 2023-06-01 DIAGNOSIS — E78.5 HYPERLIPIDEMIA LDL GOAL <100: ICD-10-CM

## 2023-06-01 DIAGNOSIS — Z13.1 SCREENING FOR DIABETES MELLITUS: ICD-10-CM

## 2023-06-01 DIAGNOSIS — G20.A1 PARKINSON'S DISEASE (H): ICD-10-CM

## 2023-06-01 LAB
ANION GAP SERPL CALCULATED.3IONS-SCNC: 12 MMOL/L (ref 7–15)
BUN SERPL-MCNC: 21.9 MG/DL (ref 8–23)
CALCIUM SERPL-MCNC: 9.6 MG/DL (ref 8.8–10.2)
CHLORIDE SERPL-SCNC: 102 MMOL/L (ref 98–107)
CHOLEST SERPL-MCNC: 127 MG/DL
CREAT SERPL-MCNC: 0.86 MG/DL (ref 0.67–1.17)
DEPRECATED HCO3 PLAS-SCNC: 26 MMOL/L (ref 22–29)
GFR SERPL CREATININE-BSD FRML MDRD: 87 ML/MIN/1.73M2
GLUCOSE SERPL-MCNC: 94 MG/DL (ref 70–99)
HDLC SERPL-MCNC: 45 MG/DL
LDLC SERPL CALC-MCNC: 68 MG/DL
NONHDLC SERPL-MCNC: 82 MG/DL
POTASSIUM SERPL-SCNC: 4.5 MMOL/L (ref 3.4–5.3)
SODIUM SERPL-SCNC: 140 MMOL/L (ref 136–145)
TRIGL SERPL-MCNC: 70 MG/DL

## 2023-06-01 PROCEDURE — G0439 PPPS, SUBSEQ VISIT: HCPCS | Performed by: INTERNAL MEDICINE

## 2023-06-01 PROCEDURE — 80061 LIPID PANEL: CPT | Performed by: INTERNAL MEDICINE

## 2023-06-01 PROCEDURE — 80048 BASIC METABOLIC PNL TOTAL CA: CPT | Performed by: INTERNAL MEDICINE

## 2023-06-01 PROCEDURE — 36415 COLL VENOUS BLD VENIPUNCTURE: CPT | Performed by: INTERNAL MEDICINE

## 2023-06-01 RX ORDER — ATORVASTATIN CALCIUM 10 MG/1
TABLET, FILM COATED ORAL
Qty: 90 TABLET | Refills: 3 | Status: SHIPPED | OUTPATIENT
Start: 2023-06-01 | End: 2023-06-02

## 2023-06-01 RX ORDER — DUTASTERIDE 0.5 MG/1
0.5 CAPSULE, LIQUID FILLED ORAL DAILY
Qty: 90 CAPSULE | Refills: 3 | Status: SHIPPED | OUTPATIENT
Start: 2023-06-01 | End: 2024-05-06

## 2023-06-01 RX ORDER — OXYBUTYNIN CHLORIDE 5 MG/1
5 TABLET, EXTENDED RELEASE ORAL DAILY
Qty: 90 TABLET | Refills: 3 | Status: SHIPPED | OUTPATIENT
Start: 2023-06-01 | End: 2024-05-06

## 2023-06-01 ASSESSMENT — ACTIVITIES OF DAILY LIVING (ADL)
CURRENT_FUNCTION: BATHING REQUIRES ASSISTANCE
CURRENT_FUNCTION: MONEY MANAGEMENT REQUIRES ASSISTANCE
CURRENT_FUNCTION: PREPARING MEALS REQUIRES ASSISTANCE
CURRENT_FUNCTION: TRANSPORTATION REQUIRES ASSISTANCE

## 2023-06-01 NOTE — PROGRESS NOTES
SUBJECTIVE:   Ghulam is a 82 year old who presents for Preventive Visit.    Are you in the first 12 months of your Medicare coverage?  No    History of Present Illness       Reason for visit:  Cholesterol check and renew zolpiden    He eats 2-3 servings of fruits and vegetables daily.He consumes 0 sweetened beverage(s) daily.He exercises with enough effort to increase his heart rate 10 to 19 minutes per day.  He exercises with enough effort to increase his heart rate 3 or less days per week.   He is taking medications regularly.  He is not taking prescribed medications regularly due to None.  Healthy Habits:     In general, how would you rate your overall health?  Fair    Frequency of exercise:  4-5 days/week    Duration of exercise:  30-45 minutes    Taking medications regularly:  0    Barriers to taking medications:  None    Medication side effects:  None    Ability to successfully perform activities of daily living:  Bathing requires assistance, transportation requires assistance, preparing meals requires assistance and money management requires assistance    Home Safety:  No safety concerns identified    Hearing Impairment:  No hearing concerns    In the past 6 months, have you been bothered by leaking of urine? Yes    In general, how would you rate your overall mental or emotional health?  Good      PHQ-2 Total Score: 0    Additional concerns today:  No        Have you ever done Advance Care Planning? (For example, a Health Directive, POLST, or a discussion with a medical provider or your loved ones about your wishes): No, advance care planning information given to patient to review.  Patient plans to discuss their wishes with loved ones or provider.         Fall risk  Fallen 2 or more times in the past year?: No  Any fall with injury in the past year?: No    Cognitive Screening   1) Repeat 3 items (Leader, Season, Table)    2) Clock draw: NORMAL  3) 3 item recall: Recalls 1 object   Results: NORMAL clock, 1-2  items recalled: COGNITIVE IMPAIRMENT LESS LIKELY    Mini-CogTM Copyright PATY Pop. Licensed by the author for use in [facility]; reprinted with permission (brant@[facility]). All rights reserved.      Do you have sleep apnea, excessive snoring or daytime drowsiness?: no    Reviewed and updated as needed this visit by clinical staff   Tobacco  Allergies  Meds  Problems             Reviewed and updated as needed this visit by Provider      Problems            Social History     Tobacco Use     Smoking status: Former     Packs/day: 0.00     Years: 3.00     Pack years: 0.00     Types: Pipe, Cigarettes     Quit date: 1975     Years since quittin.4     Smokeless tobacco: Never   Vaping Use     Vaping status: Not on file   Substance Use Topics     Alcohol use: Yes     Comment: one beer or glass of wine 2x/month             2022    10:15 AM   Alcohol Use   Prescreen: >3 drinks/day or >7 drinks/week? No     Do you have a current opioid prescription? No  Do you use any other controlled substances or medications that are not prescribed by a provider? None              Current providers sharing in care for this patient include:   Patient Care Team:  Dereck Lomeli MD as PCP - General (Internal Medicine)  Dereck Lomeli MD as Assigned PCP  Luis Brantley MD as MD (Urology)  Luis Brantley MD as Assigned Cancer Care Provider    The following health maintenance items are reviewed in Epic and correct as of today:  Health Maintenance   Topic Date Due     ZOSTER IMMUNIZATION (2 of 3) 2009     COVID-19 Vaccine (4 - Pfizer series) 2022     BMP  2023     LIPID  2023     MEDICARE ANNUAL WELLNESS VISIT  2024     ANNUAL REVIEW OF HM ORDERS  2024     FALL RISK ASSESSMENT  2024     DTAP/TDAP/TD IMMUNIZATION (2 - Td or Tdap) 10/24/2027     ADVANCE CARE PLANNING  2028     PHQ-2 (once per calendar year)  Completed     INFLUENZA  "VACCINE  Completed     Pneumococcal Vaccine: 65+ Years  Completed     IPV IMMUNIZATION  Aged Out     MENINGITIS IMMUNIZATION  Aged Out               Review of Systems      OBJECTIVE:   /76   Pulse 84   Temp 97.8  F (36.6  C) (Temporal)   Resp 18   Ht 1.88 m (6' 2\")   Wt 84 kg (185 lb 3.2 oz)   SpO2 97%   BMI 23.78 kg/m   Estimated body mass index is 23.78 kg/m  as calculated from the following:    Height as of this encounter: 1.88 m (6' 2\").    Weight as of this encounter: 84 kg (185 lb 3.2 oz).   Peak Flow from 05/30/23 0923 to 06/01/23 0923    Date and Time PF Resp   06/01/23 0905 -- 18      Physical Exam  GENERAL: alert, no distress, frail and elderly.    HENT: normal cephalic/atraumatic, nose and mouth without ulcers or lesions, oropharynx clear, oral mucous membranes moist. drooling  NECK: no adenopathy, no asymmetry, masses, or scars and thyroid normal to palpation  RESP: lungs clear to auscultation - no rales, rhonchi or wheezes  CV: regular rate and rhythm, normal S1 S2, no S3 or S4, no murmur, click or rub, no peripheral edema and peripheral pulses strong  ABDOMEN: soft, nontender, no hepatosplenomegaly, no masses and bowel sounds normal  MS: no gross musculoskeletal defects noted, no edema  SKIN: no suspicious lesions or rashes  NEURO: Resting tremor.  Significant bradykinesia and shuffling gait.   PSYCH: mentation appears normal, affect normal/bright    Diagnostic Test Results:  Labs reviewed in Epic  No results found for this or any previous visit (from the past 24 hour(s)).    ASSESSMENT / PLAN:       ICD-10-CM    1. Encounter for Medicare annual wellness exam  Z00.00       2. Parkinson's disease (H)  G20       3. Hyperlipidemia LDL goal <100  E78.5 Lipid panel reflex to direct LDL Non-fasting     atorvastatin (LIPITOR) 10 MG tablet     Lipid panel reflex to direct LDL Non-fasting      4. minimal calcified plaque on CV CT angio  R93.1 atorvastatin (LIPITOR) 10 MG tablet      5. Benign " prostatic hyperplasia with lower urinary tract symptoms, symptom details unspecified  N40.1 oxybutynin ER (DITROPAN XL) 5 MG 24 hr tablet     dutasteride (AVODART) 0.5 MG capsule      6. OAB (overactive bladder)  N32.81 oxybutynin ER (DITROPAN XL) 5 MG 24 hr tablet      7. Screening for diabetes mellitus  Z13.1 BASIC METABOLIC PANEL     BASIC METABOLIC PANEL      8. 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  Please see health maintenance list, care gaps  -He does have a short-lived orthostasis with position changes.  He recovers though quite quickly -he is not interested in using compression stockings at this time instructed to go slow with any position changes.  -cont statin  -cont f/u with neuro     Patient has been advised of split billing requirements and indicates understanding: Yes      COUNSELING:  Reviewed preventive health counseling, as reflected in patient instructions        He reports that he quit smoking about 48 years ago. His smoking use included pipe and cigarettes. He has never used smokeless tobacco.      Appropriate preventive services were discussed with this patient, including applicable screening as appropriate for cardiovascular disease, diabetes, osteopenia/osteoporosis, and glaucoma.  As appropriate for age/gender, discussed screening for colorectal cancer, prostate cancer, breast cancer, and cervical cancer. Checklist reviewing preventive services available has been given to the patient.    Reviewed patients plan of care and provided an AVS. The Basic Care Plan (routine screening as documented in Health Maintenance) for Dylan meets the Care Plan requirement. This Care Plan has been established and reviewed with the Patient and spouse.          Dereck Lomeli MD  [facility]    Identified Health Risks:    I have reviewed Opioid Use Disorder and Substance Use Disorder risk factors and  made any needed referrals.       The patient was provided with suggestions to help him develop a healthy physical lifestyle.  The patient reports that he has difficulty with activities of daily living. I have asked that the patient make a follow up appointment in 52 weeks where this issue will be further evaluated and addressed.  Information on urinary incontinence and treatment options given to patient.

## 2023-06-01 NOTE — PATIENT INSTRUCTIONS
Patient Education   Personalized Prevention Plan  You are due for the preventive services outlined below.  Your care team is available to assist you in scheduling these services.  If you have already completed any of these items, please share that information with your care team to update in your medical record.  Health Maintenance Due   Topic Date Due     Zoster (Shingles) Vaccine (2 of 3) 11/26/2009     COVID-19 Vaccine (4 - Pfizer series) 02/28/2022     Basic Metabolic Panel  04/26/2023     Cholesterol Lab  04/26/2023     Your Health Risk Assessment indicates you feel you are not in good health    A healthy lifestyle helps keep the body fit and the mind alert. It helps protect you from disease, helps you fight disease, and helps prevent chronic disease (disease that doesn't go away) from getting worse. This is important as you get older and begin to notice twinges in muscles and joints and a decline in the strength and stamina you once took for granted. A healthy lifestyle includes good healthcare, good nutrition, weight control, recreation, and regular exercise. Avoid harmful substances and do what you can to keep safe. Another part of a healthy lifestyle is stay mentally active and socially involved.    Good healthcare     Have a wellness visit every year.     If you have new symptoms, let us know right away. Don't wait until the next checkup.     Take medicines exactly as prescribed and keep your medicines in a safe place. Tell us if your medicine causes problems.   Healthy diet and weight control     Eat 3 or 4 small, nutritious, low-fat, high-fiber meals a day. Include a variety of fruits, vegetables, and whole-grain foods.     Make sure you get enough calcium in your diet. Calcium, vitamin D, and exercise help prevent osteoporosis (bone thinning).     If you live alone, try eating with others when you can. That way you get a good meal and have company while you eat it.     Try to keep a healthy weight. If  you eat more calories than your body uses for energy, it will be stored as fat and you will gain weight.     Recreation   Recreation is not limited to sports and team events. It includes any activity that provides relaxation, interest, enjoyment, and exercise. Recreation provides an outlet for physical, mental, and social energy. It can give a sense of worth and achievement. It can help you stay healthy.    Mental Exercise and Social Involvement  Mental and emotional health is as important as physical health. Keep in touch with friends and family. Stay as active as possible. Continue to learn and challenge yourself.   Things you can do to stay mentally active are:    Learn something new, like a foreign language or musical instrument.     Play SCRABBLE or do crossword puzzles. If you cannot find people to play these games with you at home, you can play them with others on your computer through the Internet.     Join a games club--anything from card games to chess or checkers or lawn bowling.     Start a new hobby.     Go back to school.     Volunteer.     Read.   Keep up with world events.  Activities of Daily Living    Your Health Risk Assessment indicates you have difficulties with activities of daily living such as housework, bathing, preparing meals, taking medication, etc. Please make a follow up appointment for us to address this issue in more detail.    Urinary Incontinence (Male)  We understand that gender is a spectrum. We may use gendered terms to talk about anatomy and health risk. Please use this sheet in a way that works best for you and your provider as you talk about your care.     Urinary incontinence means not being able to control the release of urine from the bladder.   Causes  Common causes of urinary incontinence in men include:    Infection    Certain medicines    Aging    Poor pelvic muscle tone    Bladder spasms    Obesity    Enlarged prostate    Trouble urinating and fully emptying the  bladder (urinary retention)  Other things that can cause incontinence are:     Nervous system diseases    Diabetes    Sleep apnea    Urinary tract infections    Prostate surgery    Pelvic injury  Constipation and smoking have also been identified as risk factors.   Symptoms    Urge incontinence (overactive bladder). This is a sudden urge to urinate. It occurs even though there may not be much urine in the bladder. The need to urinate often during the night is common. It's due to overactive bladder muscles.    Stress incontinence. This is urine leakage that you can't control. It can occur with sneezing, coughing, and other actions that put stress on the bladder.    Treatment  Treatment depends on what is causing the condition. Bladder infections are treated with antibiotics. Urinary retention is treated with a bladder catheter.   Home care  Follow these guidelines when caring for yourself at home:    Don't have any foods and drinks that may irritate the bladder. This includes:  ? Chocolate  ? Alcohol  ? Caffeine  ? Carbonated drinks  ? Acidic fruits and juices    Limit fluids to 6 to 8 cups a day.    Lose weight if you are overweight. This may reduce your symptoms.    If advised, do regular pelvic muscle-strengthening exercises such as Kegel exercises.    If needed, wear absorbent pads to catch urine. Change the pads often. This is for good hygiene and to prevent skin and bladder infections.    Bathe daily for good hygiene.    If an antibiotic was prescribed to treat a bladder infection, take it until it's finished. Keep taking it even if you are feeling better. This is to make sure your infection has cleared.    If a catheter was left in place, keep bacteria from getting into the collection bag. Don't disconnect the catheter from the collection bag.    Use a leg band to secure the catheter drainage tube, so it does not pull on the catheter. Drain the collection bag when it becomes full. To do this, use the drain  spout at the bottom of the bag. Don't disconnect the bag from the catheter.    Don't pull on or try to remove a catheter. The catheter must be removed by a healthcare provider.    If you smoke, stop. Ask your provider for help if you can't do this on your own.  Follow-up care  Follow up with your healthcare provider, or as advised.  When to get medical advice  Call your healthcare provider right away if any of these occur:     Fever over 100.4 F (38 C), or as directed by your provider    Bladder pain or fullness    Belly swelling, nausea, or vomiting    Back pain    Weakness, dizziness, or fainting    If a catheter was left in place, return if:  ? The catheter falls out  ? The catheter stops draining for 6 hours  ? Your urine gets cloudy or smells bad  Steffany last reviewed this educational content on 6/1/2022 2000-2022 [employer]. All rights reserved. This information is not intended as a substitute for professional medical care. Always follow your healthcare professional's instructions.

## 2023-06-02 RX ORDER — ATORVASTATIN CALCIUM 10 MG/1
TABLET, FILM COATED ORAL
Qty: 90 TABLET | Refills: 3 | Status: SHIPPED | OUTPATIENT
Start: 2023-06-02 | End: 2024-05-09

## 2023-06-02 NOTE — TELEPHONE ENCOUNTER
Prescription approved per [facility] Refill Protocol.  Justice L. Phoenix, RN

## 2023-09-12 DIAGNOSIS — F51.04 CHRONIC INSOMNIA: ICD-10-CM

## 2023-09-12 RX ORDER — ZOLPIDEM TARTRATE 6.25 MG/1
TABLET, FILM COATED, EXTENDED RELEASE ORAL
Qty: 90 TABLET | Refills: 0 | Status: SHIPPED | OUTPATIENT
Start: 2023-09-12 | End: 2023-12-12

## 2023-11-20 ENCOUNTER — THERAPY VISIT (OUTPATIENT)
Dept: PHYSICAL THERAPY | Facility: CLINIC | Age: 83
End: 2023-11-20
Payer: MEDICARE

## 2023-11-20 DIAGNOSIS — R26.9 ABNORMAL GAIT: Primary | ICD-10-CM

## 2023-11-20 DIAGNOSIS — R26.89 BALANCE PROBLEMS: ICD-10-CM

## 2023-11-20 PROCEDURE — 97110 THERAPEUTIC EXERCISES: CPT | Mod: GP | Performed by: PHYSICAL THERAPIST

## 2023-11-20 PROCEDURE — 97161 PT EVAL LOW COMPLEX 20 MIN: CPT | Mod: GP | Performed by: PHYSICAL THERAPIST

## 2023-11-20 NOTE — PROGRESS NOTES
PHYSICAL THERAPY EVALUATION  Type of Visit: Evaluation    See electronic medical record for Abuse and Falls Screening details.    Subjective   Diagnosed with Parkinsons's . CC is that he is standing and walking stooped and feels unsteady. He asked his MD for some PT sessions to work on gait and balance. MD order dated 10-24-23. He has not had any falls over the last year but previously had 4 falls d/t blood pressure issues, stood too fast and fainted. When he stands he pauses before walking.  In  he attending [employer] and [employer] program but feels this is too intense and would like a more simplified home program to work on.        Presenting condition or subjective complaint: Trouble walking  Date of onset: 10/24/23 (MD order date)    Relevant medical history: Bladder or bowel problems; Cancer; Dizziness; Hearing problems; Incontinence; Parkinson s Disease   Dates & types of surgery: hernia; appendectomy 1971    Prior diagnostic imaging/testing results:       Prior therapy history for the same diagnosis, illness or injury: Yes [employer] exercise program     Prior Level of Function  Transfers: Independent  Ambulation: Independent  ADL: Independent    Living Environment  Social support: With a significant other or spouse   Type of home: House; 2-story   Stairs to enter the home: Yes 2     Ramp: No   Stairs inside the home:   12 Is there a railing: Yes   Help at home: Home management tasks (cooking, cleaning); Home and Yard maintenance tasks; Assist for driving and community activities  Equipment owned: Straight Cane; Walker; Standard wheelchair; Grab bars     Employment: No    Hobbies/Interests: reading    Patient goals for therapy: Walk with less stooping and better balance    Pain assessment: Location: B LBP/Ratin/10     Objective      POSTURE: Standing Posture: Rounded shoulders, Forward head, Lordosis decreased, flexed trunk.  Sitting Posture: Rounded shoulders, Forward head, Lordosis decreased  RANGE  "OF MOTION:  Lumbar flexion: hands to mid lower leg; extension=10% (max loss) .   STRENGTH:  Ankle DF, knee ext and flex=5/5. Ankle PF, hip flexion and abduction =4+/5.  FLEXIBILITY: bilateral HS and gastroc decreased length  TRANSFERS: Independent, slow with some freezing noted, some difficulty initiating movement    GAIT:   Level of [place]: Independent  Assistive Device(s): None  Gait Deviations: Stride length decreased  [place] decreased  Push off decreased bilaterally  Knee extension decreased bilaterally  Trunk flexion  Decreased B arm swing    BALANCE: tandem stand=1\", TUG=34\"     Assessment & Plan   CLINICAL IMPRESSIONS  Medical Diagnosis: Parkinson's disease without dyskinesia or fluctuating manifestation    Treatment Diagnosis: decreased balance, strength, gait skills   Impression/Assessment: Patient is a 83 year old male with gait and balance d/t Parkinson's complaints.  The following significant findings have been identified: Pain, Decreased ROM/flexibility, Decreased strength, Impaired balance, Decreased proprioception, Impaired gait, Impaired muscle performance, and Impaired posture. These impairments interfere with their ability to perform self care tasks, recreational activities, household chores, household mobility, and community mobility as compared to previous level of function.     Clinical Decision Making (Complexity):  Clinical Presentation: Stable/Uncomplicated  Clinical Presentation Rationale: based on medical and personal factors listed in PT evaluation  Clinical Decision Making (Complexity): Low complexity    PLAN OF CARE  Treatment Interventions:  Interventions: Gait Training, Neuromuscular Re-education, Therapeutic Activity, Therapeutic Exercise, Self-Care/Home Management    Long Term Goals     PT Goal 1  Goal Identifier: balance  Goal Description: Patient able perform tandem stand for 10\" without use of UE.  Rationale: to maximize safety and independence within the home;to maximize " "safety and independence within the community;to maximize safety and independence with transportation;to maximize safety and independence with performance of ADLs and functional tasks;to maximize safety and independence with self cares  Goal Progress: At IE tandem stand=1\"  Target Date: 01/15/24  PT Goal 2  Goal Identifier: HEP  Goal Description: Patient independent with progressive HEP for strength, balance, and gait.  Rationale: to maximize safety and independence within the home;to maximize safety and independence within the community  Target Date: 01/15/24      Frequency of Treatment: 1x/week x 4 weeks, 2x/month x 4 weeks  Duration of Treatment: 8 weeks    Education Assessment:   Learner/Method: Patient;No Barriers to Learning    Risks and benefits of evaluation/treatment have been explained.   Patient/Family/caregiver agrees with Plan of Care.     Evaluation Time:     PT Eval, Low Complexity Minutes (61049): 20       Signing Clinician: Jayshree Phoenix PT      [facility]                                                                                   OUTPATIENT PHYSICAL THERAPY      PLAN OF TREATMENT FOR OUTPATIENT REHABILITATION   Patient's Last Name, First Name, Dylan Cuevas YOB: 1940   Provider's Name   [facility]   Medical Record No.  [id number]     Onset Date: 10/24/23 (MD order date)  Start of Care Date: 11/20/23     Medical Diagnosis:  Parkinson's disease without dyskinesia or fluctuating manifestation      PT Treatment Diagnosis:  decreased balance, strength, gait skills Plan of Treatment  Frequency/Duration: 1x/week x 4 weeks, 2x/month x 4 weeks/ 8 weeks    Certification date from 11/20/23 to 01/15/24         See note for plan of treatment details and functional goals     Jayshree Phoenix PT                         I CERTIFY THE NEED FOR THESE SERVICES FURNISHED UNDER        THIS PLAN OF TREATMENT AND WHILE UNDER "  CARE .             Physician Signature               Date    X_____________________________________________________                  Referring Provider:  Ovidio Wilder MD    Initial Assessment  See Epic Evaluation- Start of Care Date: 11/20/23

## 2023-11-27 ENCOUNTER — TRANSCRIBE ORDERS (OUTPATIENT)
Dept: OTHER | Age: 83
End: 2023-11-27

## 2023-11-27 ENCOUNTER — THERAPY VISIT (OUTPATIENT)
Dept: PHYSICAL THERAPY | Facility: CLINIC | Age: 83
End: 2023-11-27
Payer: MEDICARE

## 2023-11-27 DIAGNOSIS — G20.A1 PARKINSON'S DISEASE WITHOUT DYSKINESIA OR FLUCTUATING MANIFESTATIONS (H): Primary | ICD-10-CM

## 2023-11-27 DIAGNOSIS — R26.89 BALANCE PROBLEMS: ICD-10-CM

## 2023-11-27 DIAGNOSIS — R26.9 ABNORMAL GAIT: Primary | ICD-10-CM

## 2023-11-27 PROCEDURE — 97112 NEUROMUSCULAR REEDUCATION: CPT | Mod: GP | Performed by: PHYSICAL THERAPIST

## 2023-11-27 PROCEDURE — 97110 THERAPEUTIC EXERCISES: CPT | Mod: GP | Performed by: PHYSICAL THERAPIST

## 2023-11-28 ENCOUNTER — TRANSCRIBE ORDERS (OUTPATIENT)
Dept: OTHER | Age: 83
End: 2023-11-28

## 2023-12-04 ENCOUNTER — THERAPY VISIT (OUTPATIENT)
Dept: PHYSICAL THERAPY | Facility: CLINIC | Age: 83
End: 2023-12-04
Payer: MEDICARE

## 2023-12-04 DIAGNOSIS — R26.9 ABNORMAL GAIT: Primary | ICD-10-CM

## 2023-12-04 DIAGNOSIS — R26.89 BALANCE PROBLEMS: ICD-10-CM

## 2023-12-04 PROCEDURE — 97112 NEUROMUSCULAR REEDUCATION: CPT | Mod: GP | Performed by: PHYSICAL THERAPIST

## 2023-12-04 PROCEDURE — 97110 THERAPEUTIC EXERCISES: CPT | Mod: GP | Performed by: PHYSICAL THERAPIST

## 2023-12-11 ENCOUNTER — THERAPY VISIT (OUTPATIENT)
Dept: PHYSICAL THERAPY | Facility: CLINIC | Age: 83
End: 2023-12-11
Payer: MEDICARE

## 2023-12-11 DIAGNOSIS — R26.89 BALANCE PROBLEMS: ICD-10-CM

## 2023-12-11 DIAGNOSIS — R26.9 ABNORMAL GAIT: Primary | ICD-10-CM

## 2023-12-11 PROCEDURE — 97112 NEUROMUSCULAR REEDUCATION: CPT | Mod: GP | Performed by: PHYSICAL THERAPIST

## 2023-12-11 PROCEDURE — 97110 THERAPEUTIC EXERCISES: CPT | Mod: GP | Performed by: PHYSICAL THERAPIST

## 2023-12-12 ENCOUNTER — MYC REFILL (OUTPATIENT)
Dept: INTERNAL MEDICINE | Facility: CLINIC | Age: 83
End: 2023-12-12
Payer: MEDICARE

## 2023-12-12 DIAGNOSIS — F51.04 CHRONIC INSOMNIA: ICD-10-CM

## 2023-12-12 RX ORDER — ZOLPIDEM TARTRATE 6.25 MG/1
TABLET, FILM COATED, EXTENDED RELEASE ORAL
Qty: 180 TABLET | Refills: 0 | Status: SHIPPED | OUTPATIENT
Start: 2023-12-12 | End: 2024-05-06

## 2024-01-03 ENCOUNTER — THERAPY VISIT (OUTPATIENT)
Dept: PHYSICAL THERAPY | Facility: CLINIC | Age: 84
End: 2024-01-03
Payer: MEDICARE

## 2024-01-03 DIAGNOSIS — R26.9 ABNORMAL GAIT: Primary | ICD-10-CM

## 2024-01-03 DIAGNOSIS — R26.89 BALANCE PROBLEMS: ICD-10-CM

## 2024-01-03 PROCEDURE — 97110 THERAPEUTIC EXERCISES: CPT | Mod: GP | Performed by: PHYSICAL THERAPIST

## 2024-01-03 PROCEDURE — 97112 NEUROMUSCULAR REEDUCATION: CPT | Mod: GP | Performed by: PHYSICAL THERAPIST

## 2024-01-03 NOTE — PROGRESS NOTES
"    DISCHARGE  Reason for Discharge: Patient has met all goals.    Equipment Issued: theraband    Discharge Plan: Patient to continue home program.    Referring Provider:  Sandra Hernandez       01/03/24 0500   Appointment Info   Signing clinician's name / credentials Jayshree Phoenix PT   Total/Authorized Visits Plan 6   Visits Used 5   Medical Diagnosis Parkinson's disease without dyskinesia or fluctuating manifestation   PT Tx Diagnosis decreased balance, strength, gait skills   Quick Adds Certification   Progress Note/Certification   Start of Care Date 11/20/23   Onset of illness/injury or Date of Surgery 10/24/23  (MD order date)   Therapy Frequency 1x/week x 4 weeks, 2x/month x 4 weeks   Predicted Duration 8 weeks   Certification date from 11/20/23   Certification date to 01/15/24   Progress Note Due Date 01/15/24   Progress Note Completed Date 11/20/23   GOALS   PT Goals 2   PT Goal 1   Goal Identifier balance   Goal Description Patient able perform tandem stand for 10\" without use of UE.   Rationale to maximize safety and independence within the home;to maximize safety and independence within the community;to maximize safety and independence with transportation;to maximize safety and independence with performance of ADLs and functional tasks;to maximize safety and independence with self cares   Goal Progress Tandem stand average of 3 reps=11\"   Target Date 01/15/24   Date Met 01/03/24   PT Goal 2   Goal Identifier HEP   Goal Description Patient independent with progressive HEP for strength, balance, and gait.   Rationale to maximize safety and independence within the home;to maximize safety and independence within the community   Goal Progress Patient independent with progressive HEP.   Target Date 01/15/24   Date Met 01/03/24   Subjective Report   Subjective Report Feeling steadier, balance and gait are improved. HEP goes well. Ready to continue on his own with his exercises.   Objective Measures   Objective " "Measures Objective Measure 1;Objective Measure 2;Objective Measure 3;Objective Measure 4;Objective Measure 5   Objective Measure 1   Objective Measure TUG   Details 13\" (34\" at IE)   Objective Measure 2   Objective Measure Tandem stand   Details average of 11\"   Objective Measure 3   Objective Measure sit to  30\"   Details 7 reps   Objective Measure 4   Objective Measure Gait   Details improved ashleigh, arm swing and upright trunk, still has decreased B knee extension   Objective Measure 5   Objective Measure Posture   Details improved upright trunk in standing and gait   Treatment Interventions (PT)   Interventions Therapeutic Procedure/Exercise;Neuromuscular Re-education   Therapeutic Procedure/Exercise   Therapeutic Procedures: strength, endurance, ROM, flexibillity minutes (02974) 20   PTRx Ther Proc 1 Standing Hip Abduction   PTRx Ther Proc 1 - Details x 2 sets of 10, B 1x/day   PTRx Ther Proc 2 Toe Raises   PTRx Ther Proc 2 - Details x 2 sets of 15 1x/day   PTRx Ther Proc 3 Reverse Toe Raises   PTRx Ther Proc 3 - Details x 2 sets of 15, alternating with TR; 1x/day   PTRx Ther Proc 5 Standing Extension at Counter Supported   PTRx Ther Proc 5 - Details x10; 3x/day   PTRx Ther Proc 6 Sit to Stand   PTRx Ther Proc 6 - Details x 2 sets of 10 1x/day   Skilled Intervention Cues for form and control throughout   Neuromuscular Re-education   Neuromuscular re-ed of mvmt, balance, coord, kinesthetic sense, posture, proprioception minutes (37432) 10   Neuro Re-ed 1 tandem stand 60\"x2 ea foot forward with SBA, do in corner of kitchen counters for safety   Neuro Re-ed 1 - Details cup walk x 3' with hands on counter, cueing for high knees/go over the top   Neuro Re-ed 2 upright posture with standing and gait   PTRx Neuro Re-ed 1 Semi-Tandem stand   PTRx Neuro Re-ed 1 - Details 60\" ea foot forward   PTRx Neuro Re-ed 2 Shoulder Theraband Rows   PTRx Neuro Re-ed 2 - Details GTB x 30 1x/day   PTRx Neuro Re-ed 3 Shoulder " Theraband Low Row/[place]   PTRx Neuro Re-ed 3 - Details GTB x 30, 1x/day   Skilled Intervention verbal and visual and occasional manual cues for form, posture and control   Education   Learner/Method Patient;No Barriers to Learning   Plan   Home program PTRX HEP   Updates to plan of care d/c to HEP   Total Session Time   Timed Code Treatment Minutes 30   Total Treatment Time (sum of timed and untimed services) 30

## 2024-03-22 NOTE — TELEPHONE ENCOUNTER
.5 mg d/c'd and 6.25 mg called into [facility].   
Call pharmacy -cancel the zolpidem 5 mg rx. Make it impossible to refill that. Ok for this alternative  
Requested Prescriptions   Pending Prescriptions Disp Refills     zolpidem (AMBIEN CR) 6.25 MG CR tablet [Pharmacy Med Name: ZOLPIDEM TAR ER 6.25MG TAB]  Last Written Prescription Date:  n/a  Last Fill Quantity: n/a,   # refills: n/a  Last Office Visit: 10/13/2017  Future Office visit:       Routing refill request to provider for review/approval because:  Drug not active on patient's medication list     30 tablet 5     Sig: TAKE ONE TABLET BY MOUTH ONCE DAILY IN THE EVENING AS NEEDED FOR SLEEP    There is no refill protocol information for this order        Patient was recently on 5mg but would like to go back on the 6.25mg. Patient is completely out of medication and hoping to get this sent to pharmacy today if possible.   
0 (no pain/absence of nonverbal indicators of pain)

## 2024-05-02 ENCOUNTER — PATIENT OUTREACH (OUTPATIENT)
Dept: CARE COORDINATION | Facility: CLINIC | Age: 84
End: 2024-05-02
Payer: MEDICARE

## 2024-05-03 ENCOUNTER — MYC MEDICAL ADVICE (OUTPATIENT)
Dept: INTERNAL MEDICINE | Facility: CLINIC | Age: 84
End: 2024-05-03
Payer: MEDICARE

## 2024-05-03 DIAGNOSIS — N32.81 OAB (OVERACTIVE BLADDER): ICD-10-CM

## 2024-05-03 DIAGNOSIS — E78.5 HYPERLIPIDEMIA LDL GOAL <100: ICD-10-CM

## 2024-05-03 DIAGNOSIS — R93.1 ABNORMAL CARDIAC CT ANGIOGRAPHY: ICD-10-CM

## 2024-05-03 DIAGNOSIS — N40.1 BENIGN PROSTATIC HYPERPLASIA WITH LOWER URINARY TRACT SYMPTOMS, SYMPTOM DETAILS UNSPECIFIED: ICD-10-CM

## 2024-05-03 DIAGNOSIS — F51.04 CHRONIC INSOMNIA: ICD-10-CM

## 2024-05-07 RX ORDER — DUTASTERIDE 0.5 MG/1
0.5 CAPSULE, LIQUID FILLED ORAL DAILY
Qty: 90 CAPSULE | Refills: 0 | Status: SHIPPED | OUTPATIENT
Start: 2024-05-07 | End: 2024-05-08

## 2024-05-07 RX ORDER — OXYBUTYNIN CHLORIDE 5 MG/1
5 TABLET, EXTENDED RELEASE ORAL DAILY
Qty: 90 TABLET | Refills: 0 | Status: SHIPPED | OUTPATIENT
Start: 2024-05-07 | End: 2024-05-08

## 2024-05-07 RX ORDER — ZOLPIDEM TARTRATE 6.25 MG/1
TABLET, FILM COATED, EXTENDED RELEASE ORAL
Qty: 90 TABLET | Refills: 1 | Status: SHIPPED | OUTPATIENT
Start: 2024-05-07 | End: 2024-05-08

## 2024-05-09 NOTE — TELEPHONE ENCOUNTER
1) Vonda (C2C on file) states Zolpidem, Oxybutynin, and Dutaseride went to [facility] instead of [facility] as requested. Please send these three to [facility], pended pharmacy.     2) Atorvastatin pended to [facility] pharmacy as requested.     3) Vonda is requesting to see if Dr. Lomeli could see Ghulam sooner than scheduled 6/13/24 appointment for ear cleaning. Pt denies appointment with another provider for this. Pt struggling to hear despite clean and charged hearing aids.     Pippa Rouse RN

## 2024-05-10 RX ORDER — OXYBUTYNIN CHLORIDE 5 MG/1
5 TABLET, EXTENDED RELEASE ORAL DAILY
Qty: 90 TABLET | Refills: 0 | Status: SHIPPED | OUTPATIENT
Start: 2024-05-10 | End: 2024-06-13

## 2024-05-10 RX ORDER — DUTASTERIDE 0.5 MG/1
0.5 CAPSULE, LIQUID FILLED ORAL DAILY
Qty: 90 CAPSULE | Refills: 0 | Status: SHIPPED | OUTPATIENT
Start: 2024-05-10 | End: 2024-06-13

## 2024-05-10 RX ORDER — ATORVASTATIN CALCIUM 10 MG/1
TABLET, FILM COATED ORAL
Qty: 90 TABLET | Refills: 3 | Status: SHIPPED | OUTPATIENT
Start: 2024-05-10

## 2024-05-10 RX ORDER — ZOLPIDEM TARTRATE 6.25 MG/1
TABLET, FILM COATED, EXTENDED RELEASE ORAL
Qty: 90 TABLET | Refills: 1 | Status: SHIPPED | OUTPATIENT
Start: 2024-05-10

## 2024-05-10 NOTE — TELEPHONE ENCOUNTER
Would just book him at a convenient time for staff for ears and I can see him quickly for check- ok to double book that

## 2024-05-11 ENCOUNTER — MYC MEDICAL ADVICE (OUTPATIENT)
Dept: INTERNAL MEDICINE | Facility: CLINIC | Age: 84
End: 2024-05-11
Payer: MEDICARE

## 2024-06-13 ENCOUNTER — OFFICE VISIT (OUTPATIENT)
Dept: INTERNAL MEDICINE | Facility: CLINIC | Age: 84
End: 2024-06-13
Payer: MEDICARE

## 2024-06-13 VITALS
HEIGHT: 74 IN | WEIGHT: 187.8 LBS | OXYGEN SATURATION: 98 % | BODY MASS INDEX: 24.1 KG/M2 | RESPIRATION RATE: 17 BRPM | DIASTOLIC BLOOD PRESSURE: 72 MMHG | TEMPERATURE: 97.9 F | SYSTOLIC BLOOD PRESSURE: 122 MMHG | HEART RATE: 70 BPM

## 2024-06-13 DIAGNOSIS — Z00.00 ENCOUNTER FOR MEDICARE ANNUAL WELLNESS EXAM: Primary | ICD-10-CM

## 2024-06-13 DIAGNOSIS — H61.21 IMPACTED CERUMEN OF RIGHT EAR: ICD-10-CM

## 2024-06-13 DIAGNOSIS — G20.A1 PARKINSON'S DISEASE WITHOUT FLUCTUATING MANIFESTATIONS, UNSPECIFIED WHETHER DYSKINESIA PRESENT (H): ICD-10-CM

## 2024-06-13 DIAGNOSIS — N32.81 OAB (OVERACTIVE BLADDER): ICD-10-CM

## 2024-06-13 DIAGNOSIS — N40.1 BENIGN PROSTATIC HYPERPLASIA WITH LOWER URINARY TRACT SYMPTOMS, SYMPTOM DETAILS UNSPECIFIED: ICD-10-CM

## 2024-06-13 DIAGNOSIS — R93.1 ABNORMAL CARDIAC CT ANGIOGRAPHY: ICD-10-CM

## 2024-06-13 DIAGNOSIS — E78.5 HYPERLIPIDEMIA LDL GOAL <100: ICD-10-CM

## 2024-06-13 PROCEDURE — 99214 OFFICE O/P EST MOD 30 MIN: CPT | Mod: 25 | Performed by: INTERNAL MEDICINE

## 2024-06-13 PROCEDURE — 80048 BASIC METABOLIC PNL TOTAL CA: CPT | Performed by: INTERNAL MEDICINE

## 2024-06-13 PROCEDURE — 36415 COLL VENOUS BLD VENIPUNCTURE: CPT | Performed by: INTERNAL MEDICINE

## 2024-06-13 PROCEDURE — 80061 LIPID PANEL: CPT | Performed by: INTERNAL MEDICINE

## 2024-06-13 PROCEDURE — G0439 PPPS, SUBSEQ VISIT: HCPCS | Performed by: INTERNAL MEDICINE

## 2024-06-13 PROCEDURE — 69210 REMOVE IMPACTED EAR WAX UNI: CPT | Mod: RT | Performed by: INTERNAL MEDICINE

## 2024-06-13 RX ORDER — OXYBUTYNIN CHLORIDE 5 MG/1
5 TABLET, EXTENDED RELEASE ORAL DAILY
Qty: 90 TABLET | Refills: 3 | Status: SHIPPED | OUTPATIENT
Start: 2024-06-13

## 2024-06-13 RX ORDER — DUTASTERIDE 0.5 MG/1
0.5 CAPSULE, LIQUID FILLED ORAL DAILY
Qty: 90 CAPSULE | Refills: 3 | Status: SHIPPED | OUTPATIENT
Start: 2024-06-13

## 2024-06-13 SDOH — HEALTH STABILITY: PHYSICAL HEALTH: ON AVERAGE, HOW MANY DAYS PER WEEK DO YOU ENGAGE IN MODERATE TO STRENUOUS EXERCISE (LIKE A BRISK WALK)?: 3 DAYS

## 2024-06-13 ASSESSMENT — SOCIAL DETERMINANTS OF HEALTH (SDOH): HOW OFTEN DO YOU GET TOGETHER WITH FRIENDS OR RELATIVES?: ONCE A WEEK

## 2024-06-13 NOTE — PATIENT INSTRUCTIONS
"Patient Education   Preventive Care Advice   This is general advice we often give to help people stay healthy. Your care team may have specific advice just for you. Please talk to your care team about your own preventive care needs.  Lifestyle  Exercise at least 150 minutes each week (30 minutes a day, 5 days a week).  Do muscle strengthening activities 2 days a week. These help control your weight and prevent disease.  No smoking.  Wear sunscreen to prevent skin cancer.  Have your home tested for radon every 2 to 5 years. Radon is a colorless, odorless gas that can harm your lungs. To learn more, go to www.health.[facility].[place].us and search for \"Radon in Homes.\"  Keep guns unloaded and locked up in a safe place like a safe or gun vault, or, use a gun lock and hide the keys. Always lock away bullets separately. To learn more, visit [place].gov and search for \"safe gun storage.\"  Nutrition  Eat 5 or more servings of fruits and vegetables each day.  Try wheat bread, brown rice and whole grain pasta (instead of white bread, rice, and pasta).  Get enough calcium and vitamin D. Check the label on foods and aim for 100% of the RDA (recommended daily allowance).  Regular exams  Have a dental exam and cleaning every 6 months.  See your health care team every year to talk about:  Any changes in your health.  Any medicines your care team has prescribed.  Preventive care, family planning, and ways to prevent chronic diseases.  Shots (vaccines)   HPV shots (up to age 26), if you've never had them before.  Hepatitis B shots (up to age 59), if you've never had them before.  COVID-19 shot: Get this shot when it's due.  Flu shot: Get a flu shot every year.  Tetanus shot: Get a tetanus shot every 10 years.  Pneumococcal, hepatitis A, and RSV shots: Ask your care team if you need these based on your risk.  Shingles shot (for age 50 and up).  General health tests  Diabetes screening:  Starting at age 35, Get screened for diabetes at least " every 3 years.  If you are younger than age 35, ask your care team if you should be screened for diabetes.  Cholesterol test: At age 39, start having a cholesterol test every 5 years, or more often if advised.  Bone density scan (DEXA): At age 50, ask your care team if you should have this scan for osteoporosis (brittle bones).  Hepatitis C: Get tested at least once in your life.  Abdominal aortic aneurysm screening: Talk to your doctor about having this screening if you:  Have ever smoked; and  Are biologically male; and  Are between the ages of 65 and 75.  STIs (sexually transmitted infections)  Before age 24: Ask your care team if you should be screened for STIs.  After age 24: Get screened for STIs if you're at risk. You are at risk for STIs (including HIV) if:  You are sexually active with more than one person.  You don't use condoms every time.  You or a partner was diagnosed with a sexually transmitted infection.  If you are at risk for HIV, ask about PrEP medicine to prevent HIV.  Get tested for HIV at least once in your life, whether you are at risk for HIV or not.  Cancer screening tests  Cervical cancer screening: If you have a cervix, begin getting regular cervical cancer screening tests at age 21. Most people who have regular screenings with normal results can stop after age 65. Talk about this with your provider.  Breast cancer scan (mammogram): If you've ever had breasts, begin having regular mammograms starting at age 40. This is a scan to check for breast cancer.  Colon cancer screening: It is important to start screening for colon cancer at age 45.  Have a colonoscopy test every 10 years (or more often if you're at risk) Or, ask your provider about stool tests like a FIT test every year or Cologuard test every 3 years.  To learn more about your testing options, visit: [email or web address].  For help making a decision, visit: chen/[email or web address].  Prostate cancer screening test: If you have a  prostate and are age 55 to 69, ask your provider if you would benefit from a yearly prostate cancer screening test.  Lung cancer screening: If you are a current or former smoker age 50 to 80, ask your care team if ongoing lung cancer screenings are right for you.  For informational purposes only. Not to replace the advice of your health care provider. Copyright   2023 [facility]. All rights reserved. Clinically reviewed by the [facility]. [employer] [id number] - REV 04/24.  Learning About Activities of Daily Living  What are activities of daily living?     Activities of daily living (ADLs) are the basic self-care tasks you do every day. These include eating, bathing, dressing, and moving around.  As you age, and if you have health problems, you may find that it's harder to do some of these tasks. If so, your doctor can suggest ideas that may help.  To measure what kind of help you may need, your doctor will ask how well you are able to do ADLs. Let your doctor know if there are any tasks that you are having trouble doing. This is an important first step to getting help. And when you have the help you need, you can stay as independent as possible.  How will a doctor assess your ADLs?  Asking about ADLs is part of a routine health checkup your doctor will likely do as you age. Your health check might be done in a doctor's office, in your home, or at a hospital. The goal is to find out if you are having any problems that could make it hard to care for yourself or that make it unsafe for you to be on your own.  To measure your ADLs, your doctor will ask how hard it is for you to do routine tasks. Your doctor may also want to know if you have changed the way you do a task because of a health problem. Your doctor may watch how you:  Walk back and forth.  Keep your balance while you stand or walk.  Move from sitting to standing or from a bed to a chair.  Button or unbutton a shirt or  sweater.  Remove and put on your shoes.  It's common to feel a little worried or anxious if you find you can't do all the things you used to be able to do. Talking with your doctor about ADLs is a way to make sure you're as safe as possible and able to care for yourself as well as you can. You may want to bring a caregiver, friend, or family member to your checkup. They can help you talk to your doctor.  Follow-up care is a key part of your treatment and safety. Be sure to make and go to all appointments, and call your doctor if you are having problems. It's also a good idea to know your test results and keep a list of the medicines you take.  Current as of: October 24, 2023               Content Version: 14.0    4876-3051 [employer].   Care instructions adapted under license by your healthcare professional. If you have questions about a medical condition or this instruction, always ask your healthcare professional. [employer] disclaims any warranty or liability for your use of this information.      Preventing Falls: Care Instructions  Injuries and health problems such as trouble walking or poor eyesight can increase your risk of falling. So can some medicines. But there are things you can do to help prevent falls. You can exercise to get stronger. You can also arrange your home to make it safer.    Talk to your doctor about the medicines you take. Ask if any of them increase the risk of falls and whether they can be changed or stopped.   Try to exercise regularly. It can help improve your strength and balance. This can help lower your risk of falling.     Practice fall safety and prevention.    Wear low-heeled shoes that fit well and give your feet good support. Talk to your doctor if you have foot problems that make this hard.  Carry a cellphone or wear a medical alert device that you can use to call for help.  Use stepladders instead of chairs to reach high objects. Don't climb if  "you're at risk for falls. Ask for help, if needed.  Wear the correct eyeglasses, if you need them.    Make your home safer.    Remove rugs, cords, clutter, and furniture from walkways.  Keep your house well lit. Use night-lights in hallways and bathrooms.  Install and use sturdy handrails on stairways.  Wear nonskid footwear, even inside. Don't walk barefoot or in socks without shoes.    Be safe outside.    Use handrails, curb cuts, and ramps whenever possible.  Keep your hands free by using a shoulder bag or backpack.  Try to walk in well-lit areas. Watch out for uneven ground, changes in pavement, and debris.  Be careful in the winter. Walk on the grass or gravel when sidewalks are slippery. Use de-icer on steps and walkways. Add non-slip devices to shoes.    Put grab bars and nonskid mats in your shower or tub and near the toilet. Try to use a shower chair or bath bench when bathing.   Get into a tub or shower by putting in your weaker leg first. Get out with your strong side first. Have a phone or medical alert device in the bathroom with you.   Where can you learn more?  Go to https://www.[employer].net/patiented  Enter G117 in the search box to learn more about \"Preventing Falls: Care Instructions.\"  Current as of: July 17, 2023               Content Version: 14.0    3171-0288 [employer].   Care instructions adapted under license by your healthcare professional. If you have questions about a medical condition or this instruction, always ask your healthcare professional. [employer] disclaims any warranty or liability for your use of this information.      Hearing Loss: Care Instructions  Overview     Hearing loss is a sudden or slow decrease in how well you hear. It can range from slight to profound. Permanent hearing loss can occur with aging. It also can happen when you are exposed long-term to loud noise. Examples include listening to loud music, riding motorcycles, or being " around other loud machines.  Hearing loss can affect your work and home life. It can make you feel lonely or depressed. You may feel that you have lost your independence. But hearing aids and other devices can help you hear better and feel connected to others.  Follow-up care is a key part of your treatment and safety. Be sure to make and go to all appointments, and call your doctor if you are having problems. It's also a good idea to know your test results and keep a list of the medicines you take.  How can you care for yourself at home?  Avoid loud noises whenever possible. This helps keep your hearing from getting worse.  Always wear hearing protection around loud noises.  Wear a hearing aid as directed.  A professional can help you pick a hearing aid that will work best for you.  You can also get hearing aids over the counter for mild to moderate hearing loss.  Have hearing tests as your doctor suggests. They can show whether your hearing has changed. Your hearing aid may need to be adjusted.  Use other devices as needed. These may include:  Telephone amplifiers and hearing aids that can connect to a television, stereo, radio, or microphone.  Devices that use lights or vibrations. These alert you to the doorbell, a ringing telephone, or a baby monitor.  Television closed-captioning. This shows the words at the bottom of the screen. Most new TVs can do this.  TTY (text telephone). This lets you type messages back and forth on the telephone instead of talking or listening. These devices are also called TDD. When messages are typed on the keyboard, they are sent over the phone line to a receiving TTY. The message is shown on a monitor.  Use text messaging, social media, and email if it is hard for you to communicate by telephone.  Try to learn a listening technique called speechreading. It is not lipreading. You pay attention to people's gestures, expressions, posture, and tone of voice. These clues can help you  "understand what a person is saying. Face the person you are talking to, and have them face you. Make sure the lighting is good. You need to see the other person's face clearly.  Think about counseling if you need help to adjust to your hearing loss.  When should you call for help?  Watch closely for changes in your health, and be sure to contact your doctor if:    You think your hearing is getting worse.     You have new symptoms, such as dizziness or nausea.   Where can you learn more?  Go to https://www.[employer].net/patiented  Enter R798 in the search box to learn more about \"Hearing Loss: Care Instructions.\"  Current as of: September 27, 2023               Content Version: 14.0    5141-6074 [employer].   Care instructions adapted under license by your healthcare professional. If you have questions about a medical condition or this instruction, always ask your healthcare professional. [employer] disclaims any warranty or liability for your use of this information.      Learning About Sleeping Well  What does sleeping well mean?     Sleeping well means getting enough sleep to feel good and stay healthy. How much sleep is enough varies among people.  The number of hours you sleep and how you feel when you wake up are both important. If you do not feel refreshed, you probably need more sleep. Another sign of not getting enough sleep is feeling tired during the day.  Experts recommend that adults get at least 7 or more hours of sleep per day. Children and older adults need more sleep.  Why is getting enough sleep important?  Getting enough quality sleep is a basic part of good health. When your sleep suffers, your physical health, mood, and your thoughts can suffer too. You may find yourself feeling more grumpy or stressed. Not getting enough sleep also can lead to serious problems, including injury, accidents, anxiety, and depression.  What might cause poor sleeping?  Many things can " "cause sleep problems, including:  Changes to your sleep schedule.  Stress. Stress can be caused by fear about a single event, such as giving a speech. Or you may have ongoing stress, such as worry about work or school.  Depression, anxiety, and other mental or emotional conditions.  Changes in your sleep habits or surroundings. This includes changes that happen where you sleep, such as noise, light, or sleeping in a different bed. It also includes changes in your sleep pattern, such as having jet lag or working a late shift.  Health problems, such as pain, breathing problems, and restless legs syndrome.  Lack of regular exercise.  Using alcohol, nicotine, or caffeine before bed.  How can you help yourself?  Here are some tips that may help you sleep more soundly and wake up feeling more refreshed.  Your sleeping area   Use your bedroom only for sleeping and sex. A bit of light reading may help you fall asleep. But if it doesn't, do your reading elsewhere in the house. Try not to use your TV, computer, smartphone, or tablet while you are in bed.  Be sure your bed is big enough to stretch out comfortably, especially if you have a sleep partner.  Keep your bedroom quiet, dark, and cool. Use curtains, blinds, or a sleep mask to block out light. To block out noise, use earplugs, soothing music, or a \"white noise\" machine.  Your evening and bedtime routine   Create a relaxing bedtime routine. You might want to take a warm shower or bath, or listen to soothing music.  Go to bed at the same time every night. And get up at the same time every morning, even if you feel tired.  What to avoid   Limit caffeine (coffee, tea, caffeinated sodas) during the day, and don't have any for at least 6 hours before bedtime.  Avoid drinking alcohol before bedtime. Alcohol can cause you to wake up more often during the night.  Try not to smoke or use tobacco, especially in the evening. Nicotine can keep you awake.  Limit naps during the day, " "especially close to bedtime.  Avoid lying in bed awake for too long. If you can't fall asleep or if you wake up in the middle of the night and can't get back to sleep within about 20 minutes, get out of bed and go to another room until you feel sleepy.  Avoid taking medicine right before bed that may keep you awake or make you feel hyper or energized. Your doctor can tell you if your medicine may do this and if you can take it earlier in the day.  If you can't sleep   Imagine yourself in a peaceful, pleasant scene. Focus on the details and feelings of being in a place that is relaxing.  Get up and do a quiet or boring activity until you feel sleepy.  Avoid drinking any liquids before going to bed to help prevent waking up often to use the bathroom.  Where can you learn more?  Go to https://www.[employer].net/patiented  Enter J942 in the search box to learn more about \"Learning About Sleeping Well.\"  Current as of: July 10, 2023               Content Version: 14.0    7757-7481 [employer].   Care instructions adapted under license by your healthcare professional. If you have questions about a medical condition or this instruction, always ask your healthcare professional. [employer] disclaims any warranty or liability for your use of this information.      Bladder Training: Care Instructions  Your Care Instructions     Bladder training is used to treat urge incontinence and stress incontinence. Urge incontinence means that the need to urinate comes on so fast that you can't get to a toilet in time. Stress incontinence means that you leak urine because of pressure on your bladder. For example, it may happen when you laugh, cough, or lift something heavy.  Bladder training can increase how long you can wait before you have to urinate. It can also help your bladder hold more urine. And it can give you better control over the urge to urinate.  It is important to remember that bladder training " takes a few weeks to a few months to make a difference. You may not see results right away, but don't give up.  Follow-up care is a key part of your treatment and safety. Be sure to make and go to all appointments, and call your doctor if you are having problems. It's also a good idea to know your test results and keep a list of the medicines you take.  How can you care for yourself at home?  Work with your doctor to come up with a bladder training program that is right for you. You may use one or more of the following methods.  Delayed urination  In the beginning, try to keep from urinating for 5 minutes after you first feel the need to go.  While you wait, take deep, slow breaths to relax. Kegel exercises can also help you delay the need to go to the bathroom.  After some practice, when you can easily wait 5 minutes to urinate, try to wait 10 minutes before you urinate.  Slowly increase the waiting period until you are able to control when you have to urinate.  Scheduled urination  Empty your bladder when you first wake up in the morning.  Schedule times throughout the day when you will urinate.  Start by going to the bathroom every hour, even if you don't need to go.  Slowly increase the time between trips to the bathroom.  When you have found a schedule that works well for you, keep doing it.  If you wake up during the night and have to urinate, do it. Apply your schedule to waking hours only.  Kegel exercises  These tighten and strengthen pelvic muscles, which can help you control the flow of urine. (If doing these exercises causes pain, stop doing them and talk with your doctor.) To do Kegel exercises:  Squeeze your muscles as if you were trying not to pass gas. Or squeeze your muscles as if you were stopping the flow of urine. Your belly, legs, and buttocks shouldn't move.  Hold the squeeze for 3 seconds, then relax for 5 to 10 seconds.  Start with 3 seconds, then add 1 second each week until you are able to  "squeeze for 10 seconds.  Repeat the exercise 10 times a session. Do 3 to 8 sessions a day.  When should you call for help?  Watch closely for changes in your health, and be sure to contact your doctor if:    Your incontinence is getting worse.     You do not get better as expected.   Where can you learn more?  Go to https://www.[employer].net/patiented  Enter V684 in the search box to learn more about \"Bladder Training: Care Instructions.\"  Current as of: November 15, 2023               Content Version: 14.0    0416-4542 [employer].   Care instructions adapted under license by your healthcare professional. If you have questions about a medical condition or this instruction, always ask your healthcare professional. [employer] disclaims any warranty or liability for your use of this information.         "

## 2024-06-13 NOTE — PROGRESS NOTES
Preventive Care Visit  [facility]  Dereck Lomeli MD, Internal Medicine  Jun 13, 2024      Assessment & Plan     Encounter for Medicare annual wellness exam  Updated screening, immunizations, prevention.  Please see health maintenance list, care gaps     OAB (overactive bladder)  Benign prostatic hyperplasia with lower urinary tract symptoms, symptom details unspecified  Can try increasing to 10 mg and see if it helps more w/o any additional s/e.   Other than this BPH might also contribute - discussed seeing urology to look at procedures > meds. Not interested  - BASIC METABOLIC PANEL; Future  - oxyBUTYnin ER (DITROPAN XL) 5 MG 24 hr tablet; Take 1 tablet (5 mg) by mouth daily  - BASIC METABOLIC PANEL  - dutasteride (AVODART) 0.5 MG capsule; Take 1 capsule (0.5 mg) by mouth daily    Hyperlipidemia LDL goal <100  minimal calcified plaque on CV CT angio  Cont statin   - Lipid panel reflex to direct LDL Non-fasting; Future  - Lipid panel reflex to direct LDL Non-fasting    Parkinson's disease without fluctuating manifestations, unspecified whether dyskinesia present (H)  Per neurology     Impacted cerumen of right ear  Manual disimpaction with curette performed by MD.   - REMOVE IMPACTED CERUMEN    Patient has been advised of split billing requirements and indicates understanding: Yes     Counseling  Appropriate preventive services were discussed with this patient, including applicable screening as appropriate for fall prevention, nutrition, physical activity, Tobacco-use cessation, weight loss and cognition.  Checklist reviewing preventive services available has been given to the patient.  Reviewed patient's diet, addressing concerns and/or questions.   He is at risk for lack of exercise and has been provided with information to increase physical activity for the benefit of his well-being.   He is at risk for psychosocial distress and has been provided with information to reduce risk.    Discussed possible causes of fatigue. Updated plan of care.  Patient reported difficulty with activities of daily living were addressed today.The patient was provided with written information regarding signs of hearing loss.   Information on urinary incontinence and treatment options given to patient.       Regular exercise  See Patient Instructions    Smitha Parmar is a 83 year old, presenting for the following:  Medicare Visit      Health Care Directive  Patient does not have a Health Care Directive or Living Will: Discussed advance care planning with patient; however, patient declined at this time.    HPI   Cont to be bothered by urinary sx- both urgency and nocturia.  Can't tolerate alpha-blockers due to issues with orthostatic hypotension.             6/13/2024   General Health   How would you rate your overall physical health? (!) FAIR   Feel stress (tense, anxious, or unable to sleep) Only a little   (!) STRESS CONCERN      6/13/2024   Nutrition   Diet: Regular (no restrictions)         6/13/2024   Exercise   Days per week of moderate/strenous exercise 3 days         6/13/2024   Social Factors   Frequency of gathering with friends or relatives Once a week   Worry food won't last until get money to buy more No   Food not last or not have enough money for food? No   Do you have housing?  Yes   Are you worried about losing your housing? No   Lack of transportation? No   Unable to get utilities (heat,electricity)? No         6/13/2024   Fall Risk   Fallen 2 or more times in the past year? No    No   Trouble with walking or balance? Yes    Yes   Gait Speed Test Interpretation Greater than 5.01 seconds - ABNORMAL           6/13/2024   Activities of Daily Living- Home Safety   Needs help with the following daily activites Transportation    Shopping    Preparing meals    Housework    Bathing    Laundry    Medication administration    Money management    Dressing   Safety concerns in the home None of the above          2024   Dental   Dentist two times every year? Yes         2024   Hearing Screening   Hearing concerns? (!) I NEED TO ASK PEOPLE TO SPEAK UP OR REPEAT THEMSELVES.    (!) TROUBLE UNDERSTANDING SOFT OR WHISPERED SPEECH.         2024   Driving Risk Screening   Patient/family members have concerns about driving (!) DECLINE         2024   General Alertness/Fatigue Screening   Have you been more tired than usual lately? (!) YES         2024   Urinary Incontinence Screening   Bothered by leaking urine in past 6 months Yes         2024   TB Screening   Were you born outside of the [place]? No         Today's PHQ-2 Score:       2024    10:44 AM   PHQ-2 (  [employer])   Q1: Little interest or pleasure in doing things 0   Q2: Feeling down, depressed or hopeless 0   PHQ-2 Score 0   Q1: Little interest or pleasure in doing things Not at all   Q2: Feeling down, depressed or hopeless Not at all   PHQ-2 Score 0           2024   Substance Use   Alcohol more than 3/day or more than 7/wk Not Applicable   Do you have a current opioid prescription? No   How severe/bad is pain from 1 to 10? 1/10   Do you use any other substances recreationally? No     Social History     Tobacco Use    Smoking status: Former     Current packs/day: 0.00     Types: Pipe, Cigarettes     Quit date: 1972     Years since quittin.4    Smokeless tobacco: Never   Substance Use Topics    Alcohol use: Yes     Comment: one beer or glass of wine 2x/month    Drug use: No                 Reviewed and updated as needed this visit by Provider                      Current providers sharing in care for this patient include:  Patient Care Team:  Dereck Lomeli MD as PCP - General (Internal Medicine)  Dereck Lomeli MD as Assigned PCP  Luis Brantley MD as MD (Urology)    The following health maintenance items are reviewed in Epic and correct as of today:  Health Maintenance   Topic Date Due    RSV  "VACCINE (Pregnancy & 60+) (1 - 1-dose 60+ series) Never done    ZOSTER IMMUNIZATION (2 of 3) 11/26/2009    BMP  06/01/2024    LIPID  06/01/2024    INFLUENZA VACCINE (Season Ended) 09/01/2024    MEDICARE ANNUAL WELLNESS VISIT  06/13/2025    ANNUAL REVIEW OF HM ORDERS  06/13/2025    FALL RISK ASSESSMENT  06/13/2025    DTAP/TDAP/TD IMMUNIZATION (2 - Td or Tdap) 10/24/2027    ADVANCE CARE PLANNING  06/13/2029    PHQ-2 (once per calendar year)  Completed    Pneumococcal Vaccine: 65+ Years  Completed    IPV IMMUNIZATION  Aged Out    HPV IMMUNIZATION  Aged Out    MENINGITIS IMMUNIZATION  Aged Out    RSV MONOCLONAL ANTIBODY  Aged Out    HEPATITIS A IMMUNIZATION  Discontinued    COVID-19 Vaccine  Discontinued            Objective    Exam  /72   Pulse 70   Temp 97.9  F (36.6  C) (Temporal)   Resp 17   Ht 1.88 m (6' 2\")   Wt 85.2 kg (187 lb 12.8 oz)   SpO2 98%   BMI 24.11 kg/m     Estimated body mass index is 24.11 kg/m  as calculated from the following:    Height as of this encounter: 1.88 m (6' 2\").    Weight as of this encounter: 85.2 kg (187 lb 12.8 oz).    Physical Exam  GENERAL: alert, frail, elderly, and fatigued  HENT: normal cephalic/atraumatic, right ear: normal: no effusions, no erythema, normal landmarks and occluded with wax, left ear: normal: no effusions, no erythema, normal landmarks, nose and mouth without ulcers or lesions, oropharynx clear, and oral mucous membranes moist  NECK: no adenopathy, no asymmetry, masses, or scars  RESP: lungs clear to auscultation - no rales, rhonchi or wheezes  CV: regular rates and rhythm, normal S1 S2, no S3 or S4, no murmur, click or rub, peripheral pulses strong, and trace+ bilateral lower extremity pitting edema to lower calf    MS: extremities normal- no gross deformities noted  SKIN: no suspicious lesions or rashes  NEURO: resting tremor bilat UE, mentation intact, and gait abnormal: slow , no shuffling   LYMPH: no cervical adenopathy        6/13/2024   Mini " Cog   Clock Draw Score 2 Normal   3 Item Recall 3 objects recalled   Mini Cog Total Score 5              Signed Electronically by: Dereck Lomeli MD

## 2024-06-14 LAB
ANION GAP SERPL CALCULATED.3IONS-SCNC: 9 MMOL/L (ref 7–15)
BUN SERPL-MCNC: 24.2 MG/DL (ref 8–23)
CALCIUM SERPL-MCNC: 9.5 MG/DL (ref 8.8–10.2)
CHLORIDE SERPL-SCNC: 103 MMOL/L (ref 98–107)
CHOLEST SERPL-MCNC: 112 MG/DL
CREAT SERPL-MCNC: 0.87 MG/DL (ref 0.67–1.17)
DEPRECATED HCO3 PLAS-SCNC: 28 MMOL/L (ref 22–29)
EGFRCR SERPLBLD CKD-EPI 2021: 86 ML/MIN/1.73M2
FASTING STATUS PATIENT QL REPORTED: NO
FASTING STATUS PATIENT QL REPORTED: NO
GLUCOSE SERPL-MCNC: 96 MG/DL (ref 70–99)
HDLC SERPL-MCNC: 47 MG/DL
LDLC SERPL CALC-MCNC: 54 MG/DL
NONHDLC SERPL-MCNC: 65 MG/DL
POTASSIUM SERPL-SCNC: 4.7 MMOL/L (ref 3.4–5.3)
SODIUM SERPL-SCNC: 140 MMOL/L (ref 135–145)
TRIGL SERPL-MCNC: 55 MG/DL

## 2025-05-22 ENCOUNTER — NURSE TRIAGE (OUTPATIENT)
Dept: INTERNAL MEDICINE | Facility: CLINIC | Age: 85
End: 2025-05-22

## 2025-05-22 ENCOUNTER — OFFICE VISIT (OUTPATIENT)
Dept: URGENT CARE | Facility: URGENT CARE | Age: 85
End: 2025-05-22
Payer: MEDICARE

## 2025-05-22 VITALS
SYSTOLIC BLOOD PRESSURE: 125 MMHG | HEART RATE: 87 BPM | DIASTOLIC BLOOD PRESSURE: 63 MMHG | WEIGHT: 186 LBS | BODY MASS INDEX: 23.87 KG/M2 | HEIGHT: 74 IN | OXYGEN SATURATION: 97 % | TEMPERATURE: 97.8 F | RESPIRATION RATE: 18 BRPM

## 2025-05-22 DIAGNOSIS — L03.115 CELLULITIS OF RIGHT LOWER EXTREMITY: Primary | ICD-10-CM

## 2025-05-22 RX ORDER — CEFTRIAXONE SODIUM 1 G
1 VIAL (EA) INJECTION ONCE
Status: COMPLETED | OUTPATIENT
Start: 2025-05-22 | End: 2025-05-22

## 2025-05-22 RX ORDER — CEPHALEXIN 500 MG/1
500 CAPSULE ORAL 3 TIMES DAILY
Qty: 15 CAPSULE | Refills: 0 | Status: SHIPPED | OUTPATIENT
Start: 2025-05-22 | End: 2025-05-27

## 2025-05-22 RX ADMIN — Medication 1 G: at 13:19

## 2025-05-22 NOTE — TELEPHONE ENCOUNTER
"Pt has a sore on his left shin. Pt's wife answered triage questions. Border of sore is reddened. Pt's wife is worried that sore may be infected or skin cancer. Appointment scheduled.     Routing to provider for review.     Patient Contact    Attempt # 1    Was call answered?  Yes.  \"May I please speak with <patient name>\"  Is patient available?   Yes    1. APPEARANCE of SORES: \"What do the sores look like?\"      Scab with red around border. Edges are irregular. Flat   2. NUMBER: \"How many sores are there?\"      one  3. SIZE: \"How big is the largest sore?\"      1 inch   4. LOCATION: \"Where are the sores located?\"      [place], right   5. ONSET: \"When did the sores begin?\"      2 weeks ago  6. TENDER: \"Does it hurt when you touch it?\"  (Scale 1-10; or mild, moderate, severe)       Yes   7. CAUSE: \"What do you think is causing the sores?\"      Hx skin cancer   8. OTHER SYMPTOMS: \"Do you have any other symptoms?\" (e.g., fever, new weakness)      Denies       Reason for Disposition   Small spot, skin growth, or mole   [1] Looks infected (spreading redness, pus) AND [2] no fever    Additional Information   Negative: Sounds like a life-threatening emergency to the triager   Negative: Followed a burn   Negative: Followed an injury to the skin (such as a cut or scrape)   Negative: Boil (abscess) suspected (painful red lump)   Negative: Widespread rash or redness   Negative: Cold sore suspected (i.e., fever blister sore on the outer lip)   Negative: Insect bite(s) suspected   Negative: Poison ivy, oak, or sumac rash suspected (e.g., itchy rash after contact with poison ivy)   Negative: Sore(s) on female genital area  (e.g., labia, vagina, vulva)   Negative: Sore(s) on male genital area (e.g., penis, scrotum)   Negative: Wound infection suspected in surgical incision or traumatic wound   Negative: Black (necrotic), dark purple, or blisters develop in area of sore   Negative: Patient sounds very sick or weak to the triager   " Negative: Looks infected (e.g., spreading redness, pus) and fever   Negative: Looks infected and large red area (> 2 inches or 5 cm) or streak   Negative: Ulcer with black scab that is spreading, painless and cause unknown   Negative: [1] Looks infected AND [2] large red area (> 2 in. or 5 cm)   Negative: [1] Fever AND [2] bump is tender to touch   Negative: [1] Fever AND [2] spreading red area or streak    Protocols used: Sores-A-OH, Cancer - Skin Symptoms and [place]-E-DZ, Skin Lesion - Moles or Growths-A-AH

## 2025-05-22 NOTE — PROGRESS NOTES
Assessment & Plan     Diagnosis:    ICD-10-CM    1. Cellulitis of right lower extremity  L03.115 cefTRIAXone (ROCEPHIN) in lidocaine 1% for IM administration 1 g     cephALEXin (KEFLEX) 500 MG capsule          Medical Decision Making:  Dylan Davila is a 84 year old male who presents for evaluation of skin redness.  The history, physical exam and supporting data are consistent with cellulitis; seemed to worsen somewhat rapidly this morning per wife. There do not appear at this time to be any complication of cellulitis including necrotizing fascitis, lymphangitis, lymphadenitis, abscess, osteomyelitis, sepsis, or shock.  The patient is not immunosuppressed.  Supportive outpatient management is indicated with antibiotics.  Close follow-up of primary care physician to ensure no progression and rapid resolution. Wife is monitoring cellulitis progression with phone pictures. Cellulitis precautions for home.  Patient verbalizes understanding and agreement with the plan including reasons to go to the ER. All questions answered.     Logan Garza PA-C  [facility]    Subjective     Dylan Davila is a 84 year old male who presents to clinic today for the following health issues:  Chief Complaint   Patient presents with    Urgent Care    Leg Problem     Possible cellulitis on the R-leg swollen and redness, sting burning last few days onset 2 x  weeks.       HPI    Patient scraped his right shin about 2 weeks ago, the wound seem to spread a little bit but seems to be scabbing over now.  Over last few days they have noticed redness, slight stinging and burning sensation.  This morning the wife states that it seemed to rapidly get worse even from pictures she took this morning to now the redness is spreading down the ankle.  No fevers, red streaks going up the leg, numbness or tingling in the leg.    Review of Systems    See HPI    Objective      Vitals: /63 (BP Location: Left arm, Patient  "Position: Chair, Cuff Size: Adult Large)   Pulse 87   Temp 97.8  F (36.6  C) (Tympanic)   Resp 18   Ht 1.88 m (6' 2\")   Wt 84.4 kg (186 lb)   SpO2 97%   BMI 23.88 kg/m      Patient Vitals for the past 24 hrs:   BP Temp Temp src Pulse Resp SpO2 Height Weight   05/22/25 1239 125/63 97.8  F (36.6  C) Tympanic 87 18 97 % 1.88 m (6' 2\") 84.4 kg (186 lb)       Vital signs reviewed by: Logan Garza PA-C    Physical Exam   Constitutional: Patient is alert and cooperative. No acute distress.  Neurological: Alert and oriented x3. Sensation intact in the right shin/RLE.  MSK/Skin: Right shin with medium size scab, appears to healing.  Surrounding this is approximately 4 cm of erythema, tenderness and warmth.  No fluctuance or areas of pointing.  No lymphangitis.  Normal range of motion at the ankle.  Psychiatric:The patient has a normal mood and affect.     Interventions:  Rocephin 1g IM    Logan Garza PA-C, May 22, 2025        "

## 2025-05-22 NOTE — PROGRESS NOTES
Urgent Care Clinic Visit    Chief Complaint   Patient presents with    Urgent Care    Leg Problem     Possible cellulitis on the R-leg swollen and redness, sting burning last few days onset 2 x  weeks.               5/22/2025    12:44 PM   Additional Questions   Roomed by jian   Accompanied by wife

## 2025-05-28 DIAGNOSIS — F51.04 CHRONIC INSOMNIA: ICD-10-CM

## 2025-05-28 RX ORDER — ZOLPIDEM TARTRATE 6.25 MG/1
6.25 TABLET, FILM COATED, EXTENDED RELEASE ORAL
Qty: 90 TABLET | Refills: 1 | Status: SHIPPED | OUTPATIENT
Start: 2025-05-28

## 2025-05-29 ENCOUNTER — NURSE TRIAGE (OUTPATIENT)
Dept: INTERNAL MEDICINE | Facility: CLINIC | Age: 85
End: 2025-05-29
Payer: MEDICARE

## 2025-05-29 NOTE — TELEPHONE ENCOUNTER
Vonda, wife, contacts clinic expressing concern that patient's cellulitis of right leg has not completely resolved despite having completed antibiotics. Vonda states that there is still erythema and tenderness. Writer suggested that patient be scheduled for appointment with available provider. Wife agrees to schedule appointment.    At time of call Vonda denies presence of discharge, fever, worsening symptoms.   Reason for Disposition   Finished taking antibiotic and cellulitis symptoms are BETTER (e.g., redness, pain, drainage, swelling) but not completely gone    Additional Information   Negative: Surgical wound infection suspected (post-op)   Negative: Widespread rash and drug rash suspected (i.e., allergic reaction to antibiotic)   Negative: Animal bite wound infection suspected    Protocols used: Cellulitis on Antibiotic Follow-up Call-A-OH

## 2025-06-02 ENCOUNTER — PATIENT OUTREACH (OUTPATIENT)
Dept: CARE COORDINATION | Facility: CLINIC | Age: 85
End: 2025-06-02
Payer: MEDICARE

## 2025-06-17 ENCOUNTER — OFFICE VISIT (OUTPATIENT)
Dept: INTERNAL MEDICINE | Facility: CLINIC | Age: 85
End: 2025-06-17
Payer: MEDICARE

## 2025-06-17 VITALS
HEIGHT: 74 IN | RESPIRATION RATE: 18 BRPM | OXYGEN SATURATION: 97 % | TEMPERATURE: 98.1 F | WEIGHT: 185.3 LBS | SYSTOLIC BLOOD PRESSURE: 125 MMHG | DIASTOLIC BLOOD PRESSURE: 77 MMHG | BODY MASS INDEX: 23.78 KG/M2 | HEART RATE: 71 BPM

## 2025-06-17 DIAGNOSIS — F13.20 SEDATIVE, HYPNOTIC OR ANXIOLYTIC DEPENDENCE (H): ICD-10-CM

## 2025-06-17 DIAGNOSIS — Z00.00 ENCOUNTER FOR MEDICARE ANNUAL WELLNESS EXAM: Primary | ICD-10-CM

## 2025-06-17 DIAGNOSIS — R26.81 UNSTABLE GAIT: ICD-10-CM

## 2025-06-17 DIAGNOSIS — R60.9 DEPENDENT EDEMA: ICD-10-CM

## 2025-06-17 DIAGNOSIS — M85.89 OSTEOPENIA OF MULTIPLE SITES: ICD-10-CM

## 2025-06-17 DIAGNOSIS — Z13.1 SCREENING FOR DIABETES MELLITUS: ICD-10-CM

## 2025-06-17 DIAGNOSIS — E78.5 HYPERLIPIDEMIA LDL GOAL <100: ICD-10-CM

## 2025-06-17 DIAGNOSIS — H61.21 IMPACTED CERUMEN OF RIGHT EAR: ICD-10-CM

## 2025-06-17 DIAGNOSIS — G20.A1 PARKINSON'S DISEASE WITHOUT DYSKINESIA OR FLUCTUATING MANIFESTATIONS (H): ICD-10-CM

## 2025-06-17 DIAGNOSIS — N32.81 OAB (OVERACTIVE BLADDER): ICD-10-CM

## 2025-06-17 DIAGNOSIS — N40.1 BENIGN PROSTATIC HYPERPLASIA WITH LOWER URINARY TRACT SYMPTOMS, SYMPTOM DETAILS UNSPECIFIED: ICD-10-CM

## 2025-06-17 LAB
ANION GAP SERPL CALCULATED.3IONS-SCNC: 8 MMOL/L (ref 7–15)
BUN SERPL-MCNC: 25.2 MG/DL (ref 8–23)
CALCIUM SERPL-MCNC: 9.7 MG/DL (ref 8.8–10.4)
CHLORIDE SERPL-SCNC: 103 MMOL/L (ref 98–107)
CHOLEST SERPL-MCNC: 117 MG/DL
CREAT SERPL-MCNC: 0.88 MG/DL (ref 0.67–1.17)
EGFRCR SERPLBLD CKD-EPI 2021: 85 ML/MIN/1.73M2
ERYTHROCYTE [DISTWIDTH] IN BLOOD BY AUTOMATED COUNT: 12.3 % (ref 10–15)
FASTING STATUS PATIENT QL REPORTED: NO
FASTING STATUS PATIENT QL REPORTED: NO
GLUCOSE SERPL-MCNC: 85 MG/DL (ref 70–99)
HCO3 SERPL-SCNC: 29 MMOL/L (ref 22–29)
HCT VFR BLD AUTO: 41.2 % (ref 40–53)
HDLC SERPL-MCNC: 47 MG/DL
HGB BLD-MCNC: 14.2 G/DL (ref 13.3–17.7)
LDLC SERPL CALC-MCNC: 59 MG/DL
MCH RBC QN AUTO: 30.9 PG (ref 26.5–33)
MCHC RBC AUTO-ENTMCNC: 34.5 G/DL (ref 31.5–36.5)
MCV RBC AUTO: 90 FL (ref 78–100)
NONHDLC SERPL-MCNC: 70 MG/DL
PLATELET # BLD AUTO: 171 10E3/UL (ref 150–450)
POTASSIUM SERPL-SCNC: 4.2 MMOL/L (ref 3.4–5.3)
RBC # BLD AUTO: 4.6 10E6/UL (ref 4.4–5.9)
SODIUM SERPL-SCNC: 140 MMOL/L (ref 135–145)
TRIGL SERPL-MCNC: 54 MG/DL
WBC # BLD AUTO: 7.1 10E3/UL (ref 4–11)

## 2025-06-17 PROCEDURE — 80061 LIPID PANEL: CPT | Performed by: INTERNAL MEDICINE

## 2025-06-17 PROCEDURE — 3048F LDL-C <100 MG/DL: CPT | Performed by: INTERNAL MEDICINE

## 2025-06-17 PROCEDURE — 69210 REMOVE IMPACTED EAR WAX UNI: CPT | Performed by: INTERNAL MEDICINE

## 2025-06-17 PROCEDURE — 80048 BASIC METABOLIC PNL TOTAL CA: CPT | Performed by: INTERNAL MEDICINE

## 2025-06-17 PROCEDURE — G0439 PPPS, SUBSEQ VISIT: HCPCS | Performed by: INTERNAL MEDICINE

## 2025-06-17 PROCEDURE — 85027 COMPLETE CBC AUTOMATED: CPT | Performed by: INTERNAL MEDICINE

## 2025-06-17 PROCEDURE — 3078F DIAST BP <80 MM HG: CPT | Performed by: INTERNAL MEDICINE

## 2025-06-17 PROCEDURE — 3074F SYST BP LT 130 MM HG: CPT | Performed by: INTERNAL MEDICINE

## 2025-06-17 PROCEDURE — 36415 COLL VENOUS BLD VENIPUNCTURE: CPT | Performed by: INTERNAL MEDICINE

## 2025-06-17 PROCEDURE — 99214 OFFICE O/P EST MOD 30 MIN: CPT | Mod: 25 | Performed by: INTERNAL MEDICINE

## 2025-06-17 RX ORDER — ATORVASTATIN CALCIUM 10 MG/1
TABLET, FILM COATED ORAL
Qty: 90 TABLET | Refills: 3 | Status: SHIPPED | OUTPATIENT
Start: 2025-06-17

## 2025-06-17 RX ORDER — OXYBUTYNIN CHLORIDE 5 MG/1
5 TABLET, EXTENDED RELEASE ORAL DAILY
Qty: 90 TABLET | Refills: 3 | Status: SHIPPED | OUTPATIENT
Start: 2025-06-17

## 2025-06-17 RX ORDER — DUTASTERIDE 0.5 MG/1
0.5 CAPSULE, LIQUID FILLED ORAL DAILY
Qty: 90 CAPSULE | Refills: 3 | Status: SHIPPED | OUTPATIENT
Start: 2025-06-17

## 2025-06-17 SDOH — HEALTH STABILITY: PHYSICAL HEALTH: ON AVERAGE, HOW MANY DAYS PER WEEK DO YOU ENGAGE IN MODERATE TO STRENUOUS EXERCISE (LIKE A BRISK WALK)?: 3 DAYS

## 2025-06-17 SDOH — HEALTH STABILITY: PHYSICAL HEALTH: ON AVERAGE, HOW MANY MINUTES DO YOU ENGAGE IN EXERCISE AT THIS LEVEL?: 20 MIN

## 2025-06-17 ASSESSMENT — SOCIAL DETERMINANTS OF HEALTH (SDOH): HOW OFTEN DO YOU GET TOGETHER WITH FRIENDS OR RELATIVES?: ONCE A WEEK

## 2025-06-17 NOTE — PROGRESS NOTES
Preventive Care Visit  [facility]  Dereck Lomeli MD, Internal Medicine  Jun 17, 2025      Assessment & Plan     Encounter for Medicare annual wellness exam  Updated screening, immunizations, prevention.  Please see health maintenance list, care gaps     Hyperlipidemia LDL goal <100  Cont statin  - Lipid panel reflex to direct LDL Non-fasting; Future  - atorvastatin (LIPITOR) 10 MG tablet; TAKE 1 TABLET BY MOUTH ONCE DAILY AT BEDTIME  - Lipid panel reflex to direct LDL Non-fasting    Benign prostatic hyperplasia with lower urinary tract symptoms, symptom details unspecified  Did discuss seeing urology-but he defers at this time.  The addition of tamsulosin is complicated by his Parkinson's-he had a history of falls and tendency for postural hypotension.  - BASIC METABOLIC PANEL; Future  - dutasteride (AVODART) 0.5 MG capsule; Take 1 capsule (0.5 mg) by mouth daily.  - oxyBUTYnin ER (DITROPAN XL) 5 MG 24 hr tablet; Take 1 tablet (5 mg) by mouth daily.  - BASIC METABOLIC PANEL    OAB (overactive bladder)  - oxyBUTYnin ER (DITROPAN XL) 5 MG 24 hr tablet; Take 1 tablet (5 mg) by mouth daily.    Dependent edema  Rec'd trying compression stockings   - CBC with platelets; Future  - CBC with platelets  - Compression Sleeve/Stocking Order for DME - ONLY FOR DME    Osteopenia of multiple sites  - DX Bone Density; Future    Cerumen - R ear   Manual disimpaction with curette performed by MD     Screening for diabetes mellitus  - BASIC METABOLIC PANEL; Future  - BASIC METABOLIC PANEL    Parkinson's disease without dyskinesia or fluctuating manifestations (H)  - Physical Therapy  Referral; Future    Unstable gait  - Physical Therapy  Referral; Future    Sedative, hypnotic or anxiolytic dependence (H)    Patient has been advised of split billing requirements and indicates understanding: Yes        Counseling  Appropriate preventive services were addressed with this patient via screening,  questionnaire, or discussion as appropriate for fall prevention, nutrition, physical activity, Tobacco-use cessation, social engagement, weight loss and cognition.  Checklist reviewing preventive services available has been given to the patient.  Reviewed patient's diet, addressing concerns and/or questions.   He is at risk for lack of exercise and has been provided with information to increase physical activity for the benefit of his well-being.   Discussed possible causes of fatigue. Updated plan of care.  Patient reported difficulty with activities of daily living were addressed today.The patient was provided with written information regarding signs of hearing loss.   Information on urinary incontinence and treatment options given to patient.           Smitha Parmar is a 84 year old, presenting for the following:  Medicare Visit           HPI  Getting over bout of cellulitis RLE. Has bilat edema in both lower legs. No SOB, ORTIZ.  Does walk on the treadmill some and around his house but overall he is limited in his ambulation by his Parkinson's.  I think he spends the majority of his time sitting.         Advance Care Planning    Discussed advance care planning with patient; informed AVS has link to [employer] [employer].        6/17/2025   General Health   How would you rate your overall physical health? (!) FAIR   Feel stress (tense, anxious, or unable to sleep) Only a little   (!) STRESS CONCERN      6/17/2025   Nutrition   Diet: Regular (no restrictions)         6/17/2025   Exercise   Days per week of moderate/strenous exercise 3 days   Average minutes spent exercising at this level 20 min         6/17/2025   Social Factors   Frequency of gathering with friends or relatives Once a week   Worry food won't last until get money to buy more No   Food not last or not have enough money for food? No   Do you have housing? (Housing is defined as stable permanent housing and does not include staying outside in a car, in  a tent, in an abandoned building, in an overnight shelter, or couch-surfing.) Yes   Are you worried about losing your housing? No   Lack of transportation? No   Unable to get utilities (heat,electricity)? No         6/17/2025   Fall Risk   Fallen 2 or more times in the past year? No    No   Trouble with walking or balance? No    No       Multiple values from one day are sorted in reverse-chronological order          6/17/2025   Activities of Daily Living- Home Safety   Needs help with the following daily activites Transportation    Preparing meals    Housework    Laundry    Dressing   Safety concerns in the home None of the above       Multiple values from one day are sorted in reverse-chronological order         6/17/2025   Dental   Dentist two times every year? Yes         6/17/2025   Hearing Screening   Hearing concerns? (!) I NEED TO ASK PEOPLE TO SPEAK UP OR REPEAT THEMSELVES.    (!) TROUBLE UNDERSTANDING SOFT OR WHISPERED SPEECH.       Multiple values from one day are sorted in reverse-chronological order         6/17/2025   Driving Risk Screening   Patient/family members have concerns about driving (!) DECLINE         6/17/2025   General Alertness/Fatigue Screening   Have you been more tired than usual lately? (!) YES         6/17/2025   Urinary Incontinence Screening   Bothered by leaking urine in past 6 months Yes         Today's PHQ-2 Score:       6/17/2025     8:52 AM   PHQ-2 ( 1999 [employer])   Q1: Little interest or pleasure in doing things 0   Q2: Feeling down, depressed or hopeless 0   PHQ-2 Score 0    Q1: Little interest or pleasure in doing things Not at all   Q2: Feeling down, depressed or hopeless Not at all   PHQ-2 Score 0       Patient-reported           6/17/2025   Substance Use   Alcohol more than 3/day or more than 7/wk Not Applicable   Do you have a current opioid prescription? No   How severe/bad is pain from 1 to 10? 2/10   Do you use any other substances recreationally? No     Social History  "    Tobacco Use    Smoking status: Former     Types: Pipe    Smokeless tobacco: Never   Substance Use Topics    Alcohol use: Yes     Comment: one beer or glass of wine 2x/month    Drug use: No                 Reviewed and updated as needed this visit by Provider                      Current providers sharing in care for this patient include:  Patient Care Team:  Dereck Lomeli MD as PCP - General (Internal Medicine)  Dereck Lomeli MD as Assigned PCP  Luis Brantley MD as MD (Urology)    The following health maintenance items are reviewed in Epic and correct as of today:  Health Maintenance   Topic Date Due    ZOSTER VACCINE (2 of 3) 11/26/2009    RSV VACCINE (1 - 1-dose 75+ series) Never done    BMP  06/13/2025    LIPID  06/13/2025    ANNUAL REVIEW OF HM ORDERS  06/13/2025    MEDICARE ANNUAL WELLNESS VISIT  06/13/2025    INFLUENZA VACCINE (Season Ended) 09/01/2025    FALL RISK ASSESSMENT  06/17/2026    DTAP/TDAP/TD VACCINE (2 - Td or Tdap) 10/24/2027    ADVANCE CARE PLANNING  06/13/2029    PHQ-2 (once per calendar year)  Completed    PNEUMOCOCCAL VACCINE 50+ YEARS  Completed    HPV VACCINE  Aged Out    MENINGITIS VACCINE  Aged Out    HEPATITIS A VACCINE  Discontinued    COVID-19 VACCINE  Discontinued            Objective    Exam  /77   Pulse 71   Temp 98.1  F (36.7  C) (Temporal)   Resp 18   Ht 1.88 m (6' 2\")   Wt 84.1 kg (185 lb 4.8 oz)   SpO2 97%   BMI 23.79 kg/m     Estimated body mass index is 23.79 kg/m  as calculated from the following:    Height as of this encounter: 1.88 m (6' 2\").    Weight as of this encounter: 84.1 kg (185 lb 4.8 oz).    Physical Exam  GENERAL: alert, no distress, frail, and elderly  EYES: Eyes grossly normal to inspection, PERRL and conjunctivae and sclerae normal  HENT: normal cephalic/atraumatic.  Right ear canal obstructed with cerumen.  After removal normal TMs noted  both ears  NECK: no adenopathy, no asymmetry, masses, or scars  RESP: lungs " clear to auscultation - no rales, rhonchi or wheezes  CV: regular rates and rhythm.  Trace bilateral edema mostly in the ankle lower leg and feet  ABDOMEN: soft, nontender, no hepatosplenomegaly, no masses and bowel sounds normal  MS: Other than edema noted elsewhere, extremities grossly normal and muscle bulk and tone appears normal bilaterally  SKIN: no suspicious lesions or rashes  NEURO: Resting tremors left greater than right upper extremity,  bradykinesia shuffling gait.  PSYCH: mentation appears normal, affect normal/bright  LYMPH: no cervical adenopathy        6/17/2025   Mini Cog   Clock Draw Score 2 Normal   3 Item Recall 2 objects recalled   Mini Cog Total Score 4              Signed Electronically by: Dereck Lomeli MD

## 2025-06-17 NOTE — PATIENT INSTRUCTIONS
Patient Education   Preventive Care Advice   This is general advice given by our system to help you stay healthy. However, your care team may have specific advice just for you. Please talk to your care team about your preventive care needs.  Nutrition  Eat 5 or more servings of fruits and vegetables each day.  Try wheat bread, brown rice and whole grain pasta (instead of white bread, rice, and pasta).  Get enough calcium and vitamin D. Check the label on foods and aim for 100% of the RDA (recommended daily allowance).  Lifestyle  Exercise at least 150 minutes each week  (30 minutes a day, 5 days a week).  Do muscle strengthening activities 2 days a week. These help control your weight and prevent disease.  No smoking.  Wear sunscreen to prevent skin cancer.  Have a dental exam and cleaning every 6 months.  Yearly exams  See your health care team every year to talk about:  Any changes in your health.  Any medicines your care team has prescribed.  Preventive care, family planning, and ways to prevent chronic diseases.  Shots (vaccines)   HPV shots (up to age 26), if you've never had them before.  Hepatitis B shots (up to age 59), if you've never had them before.  COVID-19 shot: Get this shot when it's due.  Flu shot: Get a flu shot every year.  Tetanus shot: Get a tetanus shot every 10 years.  Pneumococcal, hepatitis A, and RSV shots: Ask your care team if you need these based on your risk.  Shingles shot (for age 50 and up)  General health tests  Diabetes screening:  Starting at age 35, Get screened for diabetes at least every 3 years.  If you are younger than age 35, ask your care team if you should be screened for diabetes.  Cholesterol test: At age 39, start having a cholesterol test every 5 years, or more often if advised.  Bone density scan (DEXA): At age 50, ask your care team if you should have this scan for osteoporosis (brittle bones).  Hepatitis C: Get tested at least once in your life.  STIs (sexually  transmitted infections)  Before age 24: Ask your care team if you should be screened for STIs.  After age 24: Get screened for STIs if you're at risk. You are at risk for STIs (including HIV) if:  You are sexually active with more than one person.  You don't use condoms every time.  You or a partner was diagnosed with a sexually transmitted infection.  If you are at risk for HIV, ask about PrEP medicine to prevent HIV.  Get tested for HIV at least once in your life, whether you are at risk for HIV or not.  Cancer screening tests  Cervical cancer screening: If you have a cervix, begin getting regular cervical cancer screening tests starting at age 21.  Breast cancer scan (mammogram): If you've ever had breasts, begin having regular mammograms starting at age 40. This is a scan to check for breast cancer.  Colon cancer screening: It is important to start screening for colon cancer at age 45.  Have a colonoscopy test every 10 years (or more often if you're at risk) Or, ask your provider about stool tests like a FIT test every year or Cologuard test every 3 years.  To learn more about your testing options, visit:   .  For help making a decision, visit:   https://bit.ly/dm71906.  Prostate cancer screening test: If you have a prostate, ask your care team if a prostate cancer screening test (PSA) at age 55 is right for you.  Lung cancer screening: If you are a current or former smoker ages 50 to 80, ask your care team if ongoing lung cancer screenings are right for you.  For informational purposes only. Not to replace the advice of your health care provider. Copyright   2023 [facility]. All rights reserved. Clinically reviewed by the [facility] Transitions Program. [employer] (mytaxi) 136361 - REV 01/24.  Learning About Activities of Daily Living  What are activities of daily living?     Activities of daily living (ADLs) are the basic self-care tasks you do every day. These include eating, bathing, dressing,  and moving around.  As you age, and if you have health problems, you may find that it's harder to do some of these tasks. If so, your doctor can suggest ideas that may help.  To measure what kind of help you may need, your doctor will ask how well you are able to do ADLs. Let your doctor know if there are any tasks that you are having trouble doing. This is an important first step to getting help. And when you have the help you need, you can stay as independent as possible.  How will a doctor assess your ADLs?  Asking about ADLs is part of a routine health checkup your doctor will likely do as you age. Your health check might be done in a doctor's office, in your home, or at a hospital. The goal is to find out if you are having any problems that could make it hard to care for yourself or that make it unsafe for you to be on your own.  To measure your ADLs, your doctor will ask how hard it is for you to do routine tasks. Your doctor may also want to know if you have changed the way you do a task because of a health problem. Your doctor may watch how you:  Walk back and forth.  Keep your balance while you stand or walk.  Move from sitting to standing or from a bed to a chair.  Button or unbutton a shirt or sweater.  Remove and put on your shoes.  It's common to feel a little worried or anxious if you find you can't do all the things you used to be able to do. Talking with your doctor about ADLs is a way to make sure you're as safe as possible and able to care for yourself as well as you can. You may want to bring a caregiver, friend, or family member to your checkup. They can help you talk to your doctor.  Follow-up care is a key part of your treatment and safety. Be sure to make and go to all appointments, and call your doctor if you are having problems. It's also a good idea to know your test results and keep a list of the medicines you take.  Current as of: October 24, 2024  Content Version: 14.5    3078-3996  [employer].   Care instructions adapted under license by your healthcare professional. If you have questions about a medical condition or this instruction, always ask your healthcare professional. [employer] disclaims any warranty or liability for your use of this information.    Hearing Loss: Care Instructions  Overview     Hearing loss is a sudden or slow decrease in how well you hear. It can range from slight to profound. Permanent hearing loss can occur with aging. It also can happen when you are exposed long-term to loud noise. Examples include listening to loud music, riding motorcycles, or being around other loud machines.  Hearing loss can affect your work and home life. It can make you feel lonely or depressed. You may feel that you have lost your independence. But hearing aids and other devices can help you hear better and feel connected to others.  Follow-up care is a key part of your treatment and safety. Be sure to make and go to all appointments, and call your doctor if you are having problems. It's also a good idea to know your test results and keep a list of the medicines you take.  How can you care for yourself at home?  Avoid loud noises whenever possible. This helps keep your hearing from getting worse.  Always wear hearing protection around loud noises.  Wear a hearing aid as directed.  A professional can help you pick a hearing aid that will work best for you.  You can also get hearing aids over the counter for mild to moderate hearing loss.  Have hearing tests as your doctor suggests. They can show whether your hearing has changed. Your hearing aid may need to be adjusted.  Use other devices as needed. These may include:  Telephone amplifiers and hearing aids that can connect to a television, stereo, radio, or microphone.  Devices that use lights or vibrations. These alert you to the doorbell, a ringing telephone, or a baby monitor.  Television closed-captioning. This  "shows the words at the bottom of the screen. Most new TVs can do this.  TTY (text telephone). This lets you type messages back and forth on the telephone instead of talking or listening. These devices are also called TDD. When messages are typed on the keyboard, they are sent over the phone line to a receiving TTY. The message is shown on a monitor.  Use text messaging, social media, and email if it is hard for you to communicate by telephone.  Try to learn a listening technique called speechreading. It is not lipreading. You pay attention to people's gestures, expressions, posture, and tone of voice. These clues can help you understand what a person is saying. Face the person you are talking to, and have them face you. Make sure the lighting is good. You need to see the other person's face clearly.  Think about counseling if you need help to adjust to your hearing loss.  When should you call for help?  Watch closely for changes in your health, and be sure to contact your doctor if:    You think your hearing is getting worse.     You have new symptoms, such as dizziness or nausea.   Where can you learn more?  Go to https://www.[employer]ifest.net/patiented  Enter R798 in the search box to learn more about \"Hearing Loss: Care Instructions.\"  Current as of: October 27, 2024  Content Version: 14.5 2024-2025 [employer].   Care instructions adapted under license by your healthcare professional. If you have questions about a medical condition or this instruction, always ask your healthcare professional. [employer] disclaims any warranty or liability for your use of this information.    Learning About Sleeping Well  What does sleeping well mean?     Sleeping well means getting enough sleep to feel good and stay healthy. How much sleep is enough varies among people.  The number of hours you sleep and how you feel when you wake up are both important. If you do not feel refreshed, you probably need more " "sleep. Another sign of not getting enough sleep is feeling tired during the day.  Experts recommend that adults get at least 7 or more hours of sleep per day. Children and older adults need more sleep.  Why is getting enough sleep important?  Getting enough quality sleep is a basic part of good health. When your sleep suffers, your physical health, mood, and your thoughts can suffer too. You may find yourself feeling more grumpy or stressed. Not getting enough sleep also can lead to serious problems, including injury, accidents, anxiety, and depression.  What might cause poor sleeping?  Many things can cause sleep problems, including:  Changes to your sleep schedule.  Stress. Stress can be caused by fear about a single event, such as giving a speech. Or you may have ongoing stress, such as worry about work or school.  Depression, anxiety, and other mental or emotional conditions.  Changes in your sleep habits or surroundings. This includes changes that happen where you sleep, such as noise, light, or sleeping in a different bed. It also includes changes in your sleep pattern, such as having jet lag or working a late shift.  Health problems, such as pain, breathing problems, and restless legs syndrome.  Lack of regular exercise.  Using alcohol, nicotine, or caffeine before bed.  How can you help yourself?  Here are some tips that may help you sleep more soundly and wake up feeling more refreshed.  Your sleeping area   Use your bedroom only for sleeping and sex. A bit of light reading may help you fall asleep. But if it doesn't, do your reading elsewhere in the house. Try not to use your TV, computer, smartphone, or tablet while you are in bed.  Be sure your bed is big enough to stretch out comfortably, especially if you have a sleep partner.  Keep your bedroom quiet, dark, and cool. Use curtains, blinds, or a sleep mask to block out light. To block out noise, use earplugs, soothing music, or a \"white noise\" " "machine.  Your evening and bedtime routine   Create a relaxing bedtime routine. You might want to take a warm shower or bath, or listen to soothing music.  Go to bed at the same time every night. And get up at the same time every morning, even if you feel tired.  What to avoid   Limit caffeine (coffee, tea, caffeinated sodas) during the day, and don't have any for at least 6 hours before bedtime.  Avoid drinking alcohol before bedtime. Alcohol can cause you to wake up more often during the night.  Try not to smoke or use tobacco, especially in the evening. Nicotine can keep you awake.  Limit naps during the day, especially close to bedtime.  Avoid lying in bed awake for too long. If you can't fall asleep or if you wake up in the middle of the night and can't get back to sleep within about 20 minutes, get out of bed and go to another room until you feel sleepy.  Avoid taking medicine right before bed that may keep you awake or make you feel hyper or energized. Your doctor can tell you if your medicine may do this and if you can take it earlier in the day.  If you can't sleep   Imagine yourself in a peaceful, pleasant scene. Focus on the details and feelings of being in a place that is relaxing.  Get up and do a quiet or boring activity until you feel sleepy.  Avoid drinking any liquids before going to bed to help prevent waking up often to use the bathroom.  Where can you learn more?  Go to https://www.[employer].net/patiented  Enter J942 in the search box to learn more about \"Learning About Sleeping Well.\"  Current as of: July 31, 2024  Content Version: 14.5 2024-2025 [employer].   Care instructions adapted under license by your healthcare professional. If you have questions about a medical condition or this instruction, always ask your healthcare professional. [employer] disclaims any warranty or liability for your use of this information.    Bladder Training: Care Instructions  Your Care " Instructions     Bladder training is used to treat urge incontinence and stress incontinence. Urge incontinence means that the need to urinate comes on so fast that you can't get to a toilet in time. Stress incontinence means that you leak urine because of pressure on your bladder. For example, it may happen when you laugh, cough, or lift something heavy.  Bladder training can increase how long you can wait before you have to urinate. It can also help your bladder hold more urine. And it can give you better control over the urge to urinate.  It is important to remember that bladder training takes a few weeks to a few months to make a difference. You may not see results right away, but don't give up.  Follow-up care is a key part of your treatment and safety. Be sure to make and go to all appointments, and call your doctor if you are having problems. It's also a good idea to know your test results and keep a list of the medicines you take.  How can you care for yourself at home?  Work with your doctor to come up with a bladder training program that is right for you. You may use one or more of the following methods.  Delayed urination  In the beginning, try to keep from urinating for 5 minutes after you first feel the need to go.  While you wait, take deep, slow breaths to relax. Kegel exercises can also help you delay the need to go to the bathroom.  After some practice, when you can easily wait 5 minutes to urinate, try to wait 10 minutes before you urinate.  Slowly increase the waiting period until you are able to control when you have to urinate.  Scheduled urination  Empty your bladder when you first wake up in the morning.  Schedule times throughout the day when you will urinate.  Start by going to the bathroom every hour, even if you don't need to go.  Slowly increase the time between trips to the bathroom.  When you have found a schedule that works well for you, keep doing it.  If you wake up during the night  "and have to urinate, do it. Apply your schedule to waking hours only.  Kegel exercises  These tighten and strengthen pelvic muscles, which can help you control the flow of urine. (If doing these exercises causes pain, stop doing them and talk with your doctor.) To do Kegel exercises:  Squeeze your muscles as if you were trying not to pass gas. Or squeeze your muscles as if you were stopping the flow of urine. Your belly, legs, and buttocks shouldn't move.  Hold the squeeze for 3 seconds, then relax for 5 to 10 seconds.  Start with 3 seconds, then add 1 second each week until you are able to squeeze for 10 seconds.  Repeat the exercise 10 times a session. Do 3 to 8 sessions a day.  When should you call for help?  Watch closely for changes in your health, and be sure to contact your doctor if:    Your incontinence is getting worse.     You do not get better as expected.   Where can you learn more?  Go to https://www.[employer].net/patiented  Enter V684 in the search box to learn more about \"Bladder Training: Care Instructions.\"  Current as of: April 30, 2024  Content Version: 14.5 2024-2025 [employer].   Care instructions adapted under license by your healthcare professional. If you have questions about a medical condition or this instruction, always ask your healthcare professional. [employer] disclaims any warranty or liability for your use of this information.       "

## 2025-07-02 ENCOUNTER — RESULTS FOLLOW-UP (OUTPATIENT)
Dept: INTERNAL MEDICINE | Facility: CLINIC | Age: 85
End: 2025-07-02

## 2025-07-07 ENCOUNTER — OFFICE VISIT (OUTPATIENT)
Dept: DERMATOLOGY | Facility: CLINIC | Age: 85
End: 2025-07-07
Payer: MEDICARE

## 2025-07-07 DIAGNOSIS — L81.4 LENTIGINES: ICD-10-CM

## 2025-07-07 DIAGNOSIS — L21.9 DERMATITIS, SEBORRHEIC: ICD-10-CM

## 2025-07-07 DIAGNOSIS — Z85.820 HISTORY OF MALIGNANT MELANOMA OF SKIN: Primary | ICD-10-CM

## 2025-07-07 DIAGNOSIS — Z85.828 HISTORY OF BASAL CELL CARCINOMA: ICD-10-CM

## 2025-07-07 DIAGNOSIS — D48.9 NEOPLASM OF UNCERTAIN BEHAVIOR: ICD-10-CM

## 2025-07-07 PROCEDURE — 88305 TISSUE EXAM BY PATHOLOGIST: CPT | Mod: GC | Performed by: DERMATOLOGY

## 2025-07-07 ASSESSMENT — PAIN SCALES - GENERAL: PAINLEVEL_OUTOF10: NO PAIN (0)

## 2025-07-07 NOTE — PROGRESS NOTES
[facility] Dermatology Note    Encounter Date: Jul 7, 2025    Dermatology Problem List:  #Hx MM 2011   - L forearm s/p negative SLNB    #Hx BCC   - 2016 L forehead     Major PMHx  -     Social Hx:  ______________________________________    Impression/Plan:  Ghulam was seen today for skin check.    Diagnoses and all orders for this visit:    History of malignant melanoma of skin  - No obvious evidence of recurrence at site of previous malignancy    Neoplasm of uncertain behavior  -     Dermatological Path Order and Indications; Standing  -     Dermatological Path Order and Indications  -     SHAVE 1 [[id number]]  - see procedure note  - possible pBCC L neck       History of basal cell carcinoma  - No obvious evidence of recurrence at site of previous malignancy    Dermatitis, seborrheic  - Discussed the use of prescription medicines for treatment  - declines for now     Lentigines  - Reviewed the compounding benefits of incremental changes to sun protective behaviors including increased frequency of sunscreen and sun protective clothing like broad brimmed hats and longsleeved UPF containing clothing    - SHAVE BIOPSY PROCEDURE NOTE: After written informed consent was obtained, a time out was taken to identify the patient and the correct site for biopsy. The lesion on the L neck was cleansed with a 70% isopropyl alcohol wipe, and then injected with 2 cc of lidocaine 1% with epinephrine 1:100,000. Once anesthesia was ensured, the visible surface of the lesion was biopsied using a Key blade in standard technique. Hemostasis was obtained with pressure and aluminum chloride 20% solution. The specimen was placed in a labeled formalin container and sent to pathology for sectioning and analysis. The wound was dressed with white petrolatum and an adhesive bandage. The patient tolerated the procedure well. Post-procedure instructions and recommendations were provided both verbally and in  writing.    Follow-up in 1 year.       Staff Involved:  Staff Only    Mika Michelle MD   of Dermatology  Department of Dermatology  [facility]      CC:   Chief Complaint   Patient presents with    Skin Check     Right abdomen and back       History of Present Illness:  Mr. Dylan Davila is a 84 year old male who presents as a new patient.    Pt presents today for concerns about spots on trunk and extremities. Accompanied by wife. Has parkinsons      Labs:      Physical exam:  Vitals: There were no vitals taken for this visit.  GEN: well developed, well-nourished, in no acute distress, in a pleasant mood.     SKIN: Dickinson phototype 1  - Full skin, which includes the head/face, both arms, chest, back, abdomen,both legs, genitalia and/or groin buttocks, digits and/or nails, was examined.  - Flat brown macules and patches in a sun exposed areas on face and extremities  - scattered brown papules on trunk and extremities   - Stuck on brown papules on trunk and extremities   - greasy scaling of scalp and NLF  - No other lesions of concern on areas examined.     Past Medical History:   Past Medical History:   Diagnosis Date    Basal cell carcinoma     forehead    Benign localized hyperplasia of prostate with urinary obstruction and other lower urinary tract symptoms (LUTS)(600.21)     Degeneration of lumbar or lumbosacral intervertebral disc     Family history of malignant neoplasm of prostate     Impotence of organic origin     Malignant melanoma (H)     Osteopenia     Other and unspecified hyperlipidemia     Palpitations     Parkinsons disease (H)     Pneumonia, organism unspecified(486)     Prostatitis, unspecified     Resting tremor     Sensorineural hearing loss, unspecified     aids au     Past Surgical History:   Procedure Laterality Date    APPENDECTOMY  1971    COLONOSCOPY  2011 o.k.    ENT SURGERY  9/10/15 Dr. Puri -no tumor causing hoarse voice    GI  SURGERY  1996 hemorrhoidectomy    HERNIA REPAIR      PHACOEMULSIFICATION CLEAR CORNEA WITH STANDARD INTRAOCULAR LENS IMPLANT Right 10/13/2015    Procedure: PHACOEMULSIFICATION CLEAR CORNEA WITH STANDARD INTRAOCULAR LENS IMPLANT;  Surgeon: Jace Warner MD;  Location: [facility]    PHACOEMULSIFICATION CLEAR CORNEA WITH STANDARD INTRAOCULAR LENS IMPLANT Left 11/10/2015    Procedure: PHACOEMULSIFICATION CLEAR CORNEA WITH STANDARD INTRAOCULAR LENS IMPLANT;  Surgeon: Jace Warner MD;  Location: [facility]    SIGMOIDOSCOPY,DIAGNOSTIC  2007    negative sigmoidoscopy at [facility] office (see consult note)    [facility] NONSPECIFIC PROCEDURE      vasectomy    [facility] NONSPECIFIC PROCEDURE      hemorrhoidectomy    [facility] NONSPECIFIC PROCEDURE      right inguinal herniorrhaphy    [facility] NONSPECIFIC PROCEDURE      T&A    Z NONSPECIFIC PROCEDURE      prostate bx    [facility] NONSPECIFIC PROCEDURE  2011    excision, malignant melanoma       Social History:   reports that he has quit smoking. His smoking use included pipe. He has never used smokeless tobacco. He reports current alcohol use. He reports that he does not use drugs.    Family History:  Family History   Problem Relation Age of Onset    Heart Failure Mother         b:1913 CHF    Coronary Artery Disease Mother     Depression Mother     Prostate Cancer Father         d:age 72    Coronary Artery Disease Father          age 79    Prostate Cancer Brother     Multiple myeloma Brother     Melanoma Brother     Cancer Brother        Medications:  Current Outpatient Medications   Medication Sig Dispense Refill    atorvastatin (LIPITOR) 10 MG tablet TAKE 1 TABLET BY MOUTH ONCE DAILY AT BEDTIME 90 tablet 3    carbidopa-levodopa (SINEMET)  MG tablet Take 2.5 tablets by mouth 3 times daily Taking one tablet at night  6    Cholecalciferol (VITAMIN D3 PO) Take by mouth daily      co-enzyme Q-10 50 MG CAPS Take 100 mg by mouth       dutasteride (AVODART) 0.5 MG  capsule Take 1 capsule (0.5 mg) by mouth daily. 90 capsule 3    fluticasone (FLONASE) 50 MCG/ACT nasal spray Spray 2 sprays into both nostrils daily 16 g 11    MULTIVITAMIN TABS   OR 1 tab qd  0    oxyBUTYnin ER (DITROPAN XL) 5 MG 24 hr tablet Take 1 tablet (5 mg) by mouth daily. 90 tablet 3    zolpidem ER (AMBIEN CR) 6.25 MG CR tablet Take 1 tablet (6.25 mg) by mouth nightly as needed for sleep. 90 tablet 1     Allergies   Allergen Reactions    Calcium      constipation    Flomax [Tamsulosin Hcl]      dizziness    Morphine And Codeine      constipation

## 2025-07-07 NOTE — LETTER
7/7/2025      Dylan Davila  [address] [facility] [address]      Dear Colleague,    Thank you for referring your patient, Dylan Davila, to the [facility]. Please see a copy of my visit note below.    [facility] Dermatology Note    Encounter Date: Jul 7, 2025    Dermatology Problem List:  #Hx MM 2011   - L forearm s/p negative SLNB    #Hx BCC   - 2016 L forehead     Major PMHx  -     Social Hx:  ______________________________________    Impression/Plan:  Ghulam was seen today for skin check.    Diagnoses and all orders for this visit:    History of malignant melanoma of skin  - No obvious evidence of recurrence at site of previous malignancy    Neoplasm of uncertain behavior  -     Dermatological Path Order and Indications; Standing  -     Dermatological Path Order and Indications  -     SHAVE 1 [[id number]]  - see procedure note  - possible pBCC L neck       History of basal cell carcinoma  - No obvious evidence of recurrence at site of previous malignancy    Dermatitis, seborrheic  - Discussed the use of prescription medicines for treatment  - declines for now     Lentigines  - Reviewed the compounding benefits of incremental changes to sun protective behaviors including increased frequency of sunscreen and sun protective clothing like broad brimmed hats and longsleeved UPF containing clothing    - SHAVE BIOPSY PROCEDURE NOTE: After written informed consent was obtained, a time out was taken to identify the patient and the correct site for biopsy. The lesion on the L neck was cleansed with a 70% isopropyl alcohol wipe, and then injected with 2 cc of lidocaine 1% with epinephrine 1:100,000. Once anesthesia was ensured, the visible surface of the lesion was biopsied using a Key blade in standard technique. Hemostasis was obtained with pressure and aluminum chloride 20% solution. The specimen was placed in a labeled formalin container and sent  to pathology for sectioning and analysis. The wound was dressed with white petrolatum and an adhesive bandage. The patient tolerated the procedure well. Post-procedure instructions and recommendations were provided both verbally and in writing.    Follow-up in 1 year.       Staff Involved:  Staff Only    Mika Michelle MD   of Dermatology  Department of Dermatology  [facility]      CC:   Chief Complaint   Patient presents with     Skin Check     Right abdomen and back       History of Present Illness:  Mr. Dylan Davila is a 84 year old male who presents as a new patient.    Pt presents today for concerns about spots on trunk and extremities. Accompanied by wife. Has parkinsons      Labs:      Physical exam:  Vitals: There were no vitals taken for this visit.  GEN: well developed, well-nourished, in no acute distress, in a pleasant mood.     SKIN: Dickinson phototype 1  - Full skin, which includes the head/face, both arms, chest, back, abdomen,both legs, genitalia and/or groin buttocks, digits and/or nails, was examined.  - Flat brown macules and patches in a sun exposed areas on face and extremities  - scattered brown papules on trunk and extremities   - Stuck on brown papules on trunk and extremities   - greasy scaling of scalp and NLF  - No other lesions of concern on areas examined.     Past Medical History:   Past Medical History:   Diagnosis Date     Basal cell carcinoma     forehead     Benign localized hyperplasia of prostate with urinary obstruction and other lower urinary tract symptoms (LUTS)(600.21)      Degeneration of lumbar or lumbosacral intervertebral disc      Family history of malignant neoplasm of prostate      Impotence of organic origin      Malignant melanoma (H)      Osteopenia      Other and unspecified hyperlipidemia      Palpitations      Parkinsons disease (H)      Pneumonia, organism unspecified(486)      Prostatitis, unspecified       Resting tremor      Sensorineural hearing loss, unspecified     aids au     Past Surgical History:   Procedure Laterality Date     APPENDECTOMY  1971     COLONOSCOPY   o.k.     ENT SURGERY  9/10/15 Dr. Puri -no tumor causing hoarse voice     GI SURGERY   hemorrhoidectomy     HERNIA REPAIR       PHACOEMULSIFICATION CLEAR CORNEA WITH STANDARD INTRAOCULAR LENS IMPLANT Right 10/13/2015    Procedure: PHACOEMULSIFICATION CLEAR CORNEA WITH STANDARD INTRAOCULAR LENS IMPLANT;  Surgeon: Jace Warner MD;  Location:  [facility]     PHACOEMULSIFICATION CLEAR CORNEA WITH STANDARD INTRAOCULAR LENS IMPLANT Left 11/10/2015    Procedure: PHACOEMULSIFICATION CLEAR CORNEA WITH STANDARD INTRAOCULAR LENS IMPLANT;  Surgeon: Jace Warner MD;  Location:  [facility]     SIGMOIDOSCOPY,DIAGNOSTIC  2007    negative sigmoidoscopy at [facility] office (see consult note)     ZZC NONSPECIFIC PROCEDURE      vasectomy     ZC NONSPECIFIC PROCEDURE      hemorrhoidectomy     ZC NONSPECIFIC PROCEDURE      right inguinal herniorrhaphy     Z NONSPECIFIC PROCEDURE      T&A     Z NONSPECIFIC PROCEDURE      prostate bx     Z NONSPECIFIC PROCEDURE      excision, malignant melanoma       Social History:   reports that he has quit smoking. His smoking use included pipe. He has never used smokeless tobacco. He reports current alcohol use. He reports that he does not use drugs.    Family History:  Family History   Problem Relation Age of Onset     Heart Failure Mother         b:1913 CHF     Coronary Artery Disease Mother      Depression Mother      Prostate Cancer Father         d:age 72     Coronary Artery Disease Father          age 79     Prostate Cancer Brother      Multiple myeloma Brother      Melanoma Brother      Cancer Brother        Medications:  Current Outpatient Medications   Medication Sig Dispense Refill     atorvastatin (LIPITOR) 10 MG tablet TAKE 1 TABLET BY MOUTH ONCE DAILY AT BEDTIME  90 tablet 3     carbidopa-levodopa (SINEMET)  MG tablet Take 2.5 tablets by mouth 3 times daily Taking one tablet at night  6     Cholecalciferol (VITAMIN D3 PO) Take by mouth daily       co-enzyme Q-10 50 MG CAPS Take 100 mg by mouth        dutasteride (AVODART) 0.5 MG capsule Take 1 capsule (0.5 mg) by mouth daily. 90 capsule 3     fluticasone (FLONASE) 50 MCG/ACT nasal spray Spray 2 sprays into both nostrils daily 16 g 11     MULTIVITAMIN TABS   OR 1 tab qd  0     oxyBUTYnin ER (DITROPAN XL) 5 MG 24 hr tablet Take 1 tablet (5 mg) by mouth daily. 90 tablet 3     zolpidem ER (AMBIEN CR) 6.25 MG CR tablet Take 1 tablet (6.25 mg) by mouth nightly as needed for sleep. 90 tablet 1     Allergies   Allergen Reactions     Calcium      constipation     Flomax [Tamsulosin Hcl]      dizziness     Morphine And Codeine      constipation               Again, thank you for allowing me to participate in the care of your patient.        Sincerely,        Mika Michelle MD    Electronically signed

## 2025-07-07 NOTE — PATIENT INSTRUCTIONS
Wound Care After a Biopsy    What is a skin biopsy?  A skin biopsy allows the doctor to examine a very small piece of tissue under the microscope to determine the diagnosis and the best treatment for the skin condition. A local anesthetic (numbing medicine) is injected with a very small needle into the skin area to be tested. A small piece of skin is taken from the area. Sometimes a suture (stitch) is used.     What are the risks of a skin biopsy?  I will experience scar, bleeding, swelling, pain, crusting and redness. I may experience incomplete removal or recurrence. Risks of this procedure are excessive bleeding, bruising, infection, nerve damage, numbness, thick (hypertrophic or keloidal) scar and non-diagnostic biopsy.    How should I care for my wound for the first 24 hours?  Keep the wound dry and covered for 24 hours  If it bleeds, hold direct pressure on the area for 15 minutes. If bleeding does not stop, call us or go to the emergency room  Avoid strenuous exercise the first 1-2 days or as your doctor instructs you    How should I care for the wound after 24 hours?  After 24 hours, remove the bandage  You may bathe or shower as normal  If you had a scalp biopsy, you can shampoo as usual and can use shower water to clean the biopsy site daily  Clean the wound once a day with gentle soap and water  Do not scrub, be gentle  Apply white petroleum/Vaseline after cleaning the wound with a cotton swab or a clean finger, and keep the site covered with a Bandaid /bandage. Bandages are not necessary with a scalp biopsy  If you are unable to cover the site with a Bandaid /bandage, re-apply ointment 2-3 times a day to keep the site moist. Moisture will help with healing  Avoid strenuous activity for first 1-2 days  Avoid lakes, rivers, pools, and oceans until the stitches are removed or the site is healed    How do I clean my wound?  Wash hands thoroughly with soap or use hand  before all wound care  Clean  the wound with gentle soap and water  Apply white petroleum/Vaseline  to wound after it is clean  Replace the Bandaid /bandage to keep the wound covered for the first few days or as instructed by your doctor  If you had a scalp biopsy, warm shower water to the area on a daily basis should suffice    What should I use to clean my wound?   Cotton-tipped applicators (Qtips )  White petroleum jelly (Vaseline ). Use a clean new container and use Q-tips to apply.  Bandaids  as needed  Gentle soap     How should I care for my wound long term?  Do not get your wound dirty  Keep up with wound care for one week or until the area is healed.  A small scab will form and fall off by itself when the area is completely healed. The area will be red and will become pink in color as it heals. Sun protection is very important for how your scar will turn out. Sunscreen with an SPF 30 or greater is recommended once the area is healed.  You should have some soreness but it should be mild and slowly go away over several days. Talk to your doctor about using tylenol for pain,    When should I call my doctor?  If you have increased:   Pain or swelling  Pus or drainage (clear or slightly yellow drainage is ok)  Temperature over 100F  Spreading redness or warmth around wound    When will I hear about my results?  The biopsy results can take 2 weeks to come back.  Your results will automatically release to [employer] before your provider has even reviewed them.  The clinic will call you with the results, send you a [employer] message, or have you schedule a follow-up clinic or phone time to discuss the results.  Contact our clinics if you do not hear from us in 2 weeks.    Who should I call with questions?  [facility]: [phone]  [facility] [place]: [phone]  For urgent needs outside of business hours call the [facility] at [phone] and ask for the dermatology resident on call

## 2025-07-09 LAB
PATH REPORT.COMMENTS IMP SPEC: ABNORMAL
PATH REPORT.COMMENTS IMP SPEC: ABNORMAL
PATH REPORT.COMMENTS IMP SPEC: YES
PATH REPORT.FINAL DX SPEC: ABNORMAL
PATH REPORT.GROSS SPEC: ABNORMAL
PATH REPORT.MICROSCOPIC SPEC OTHER STN: ABNORMAL
PATH REPORT.RELEVANT HX SPEC: ABNORMAL

## 2025-07-17 ENCOUNTER — ANCILLARY PROCEDURE (OUTPATIENT)
Dept: BONE DENSITY | Facility: CLINIC | Age: 85
End: 2025-07-17
Attending: INTERNAL MEDICINE
Payer: MEDICARE

## 2025-07-17 DIAGNOSIS — M85.89 OSTEOPENIA OF MULTIPLE SITES: ICD-10-CM

## 2025-07-17 PROCEDURE — 77080 DXA BONE DENSITY AXIAL: CPT | Mod: TC | Performed by: RADIOLOGY

## 2025-07-29 ASSESSMENT — ACTIVITIES OF DAILY LIVING (ADL)
LEFS_RAW_SCORE: 0
STANDING_FOR_1_HOUR: EXTREME DIFFICULTY OR UNABLE TO PERFORM ACTIVITY
SITTING_FOR_1_HOUR: NO DIFFICULTY
GOING_UP_OR_DOWN_10_STAIRS: A LITTLE BIT OF DIFFICULTY
MAKING_SHARP_TURNS_WHILE_RUNNING_FAST: EXTREME DIFFICULTY OR UNABLE TO PERFORM ACTIVITY
GETTING_INTO_OR_OUT_OF_A_CAR: MODERATE DIFFICULTY
RUNNING_ON_EVEN_GROUND: EXTREME DIFFICULTY OR UNABLE TO PERFORM ACTIVITY
ANY_OF_YOUR_USUAL_WORK,_HOUSEWORK_OR_SCHOOL_ACTIVITIES: MODERATE DIFFICULTY
LEFS_SCORE(%): 0
SQUATTING: EXTREME DIFFICULTY OR UNABLE TO PERFORM ACTIVITY
WALKING_2_BLOCKS: QUITE A BIT OF DIFFICULTY
WALKING_BETWEEN_ROOMS: MODERATE DIFFICULTY
PERFORMING_LIGHT_ACTIVITIES_AROUND_YOUR_HOME: QUITE A BIT OF DIFFICULTY
LIFTING_AN_OBJECT,_LIKE_A_BAG_OF_GROCERIES_FROM_THE_FLOOR: MODERATE DIFFICULTY
PUTTING_ON_YOUR_SHOES_OR_SOCKS: EXTREME DIFFICULTY OR UNABLE TO PERFORM ACTIVITY
WALKING_A_MILE: QUITE A BIT OF DIFFICULTY
PLEASE_INDICATE_YOR_PRIMARY_REASON_FOR_REFERRAL_TO_THERAPY:: FOOT AND/OR ANKLE
YOUR_USUAL_HOBBIES,_RECREATIONAL_OR_SPORTING_ACTIVITIES: EXTREME DIFFICULTY OR UNABLE TO PERFORM ACTIVITY
PERFORMING_HEAVY_ACTIVITIES_AROUND_YOUR_HOME: EXTREME DIFFICULTY OR UNABLE TO PERFORM ACTIVITY
SHOPPING: EXTREME DIFFICULTY OR UNABLE TO PERFORM ACTIVITY
GETTING_INTO_AND_OUT_OF_A_BATH: A LITTLE BIT OF DIFFICULTY
RUNNING_ON_UNEVEN_GROUND: EXTREME DIFFICULTY OR UNABLE TO PERFORM ACTIVITY
ROLLING_OVER_IN_BED: MODERATE DIFFICULTY

## 2025-07-30 ENCOUNTER — THERAPY VISIT (OUTPATIENT)
Dept: PHYSICAL THERAPY | Facility: CLINIC | Age: 85
End: 2025-07-30
Attending: INTERNAL MEDICINE
Payer: MEDICARE

## 2025-07-30 DIAGNOSIS — R26.89 BALANCE PROBLEMS: ICD-10-CM

## 2025-07-30 DIAGNOSIS — R26.81 UNSTABLE GAIT: ICD-10-CM

## 2025-07-30 DIAGNOSIS — R26.9 ABNORMAL GAIT: Primary | ICD-10-CM

## 2025-07-30 DIAGNOSIS — G20.A1 PARKINSON'S DISEASE WITHOUT DYSKINESIA OR FLUCTUATING MANIFESTATIONS (H): ICD-10-CM

## 2025-07-30 PROCEDURE — 97161 PT EVAL LOW COMPLEX 20 MIN: CPT | Mod: GP | Performed by: PHYSICAL THERAPIST

## 2025-07-30 PROCEDURE — 97110 THERAPEUTIC EXERCISES: CPT | Mod: GP | Performed by: PHYSICAL THERAPIST

## 2025-07-30 NOTE — PROGRESS NOTES
PHYSICAL THERAPY EVALUATION  Type of Visit: Evaluation        Fall Risk Screen:  Have you fallen 2 or more times in the past year?: No  Have you fallen and had an injury in the past year?: No    Subjective   Per Chart Review:  His movement has slowed down somewhat over the past few months. No falls. Has slowness of though process but no other significant cognitive decline. He would like to discuss potential use of a walker and to continue to work on his balance and posture.  Gait: stooped posture, decreased ashleigh and stride length, takes 4-5 steps to turn directions.        Presenting condition or subjective complaint: better balance with walking  Date of onset: 06/17/25    Relevant medical history: Cancer; Hearing problems; Incontinence; Osteoporosis; Parkinson s Disease   Dates & types of surgery: MOHS Melonma appendix    Prior diagnostic imaging/testing results: Bone scan     Prior therapy history for the same diagnosis, illness or injury: Yes PT    Prior Level of Function  Transfers: Independent  Ambulation: Independent  ADL: Independent  IADL:     Living Environment  Social support: With a significant other or spouse   Type of home: House   Stairs to enter the home: Yes 2 Is there a railing: Yes     Ramp: No   Stairs inside the home: Yes 12 Is there a railing: Yes     Help at home: Self Cares (home health aide/personal care attendant, family, etc); Home management tasks (cooking, cleaning); Medication and/or finances; Home and Yard maintenance tasks; Assist for driving and community activities  Equipment owned: Straight Cane; Walker with wheels     Employment: No    Hobbies/Interests: reading, time with family    Patient goals for therapy: not bending when walking (stand and walk more uprightly and steadily)    Pain assessment: See objective evaluation for additional pain details     Objective   Gait: flexed trunk and neck, decreased ashleigh and stride length  Posture: flexed trunk and neck, decreased  "lordosis  TU\" (takes 4-5 steps to turn directions)  Tandem stand: 1\"  Modified tandem stand: 13\"  Sit to  30\": 5  Thoracic ROM: ext=10% (major loss)  Cervical ROM: ext=33%  LE strength: hip flex=5/5, hip abd=4+/5, knee ext=5/5, knee flex=5/5, ankle DF=5/5=, ankle PF=4/5.    Assessment & Plan   CLINICAL IMPRESSIONS  Medical Diagnosis: Parkinson's disease without dyskinesia or fluctuating manifestations (H)  Unstable gait    Treatment Diagnosis: decreased gait skills   Impression/Assessment: Patient is a 84 year old male with gait/balance/weakness complaints.  The following significant findings have been identified: Pain, Decreased ROM/flexibility, Decreased strength, Decreased proprioception, Impaired gait, Decreased activity tolerance, and Impaired posture. These impairments interfere with their ability to perform self care tasks, recreational activities, household chores, household mobility, and community mobility as compared to previous level of function.     Clinical Decision Making (Complexity):  Clinical Presentation: Stable/Uncomplicated  Clinical Presentation Rationale: based on medical and personal factors listed in PT evaluation  Clinical Decision Making (Complexity): Low complexity    PLAN OF CARE  Treatment Interventions:  Interventions: Gait Training, Neuromuscular Re-education, Therapeutic Activity, Therapeutic Exercise, Self-Care/Home Management    Long Term Goals     PT Goal 1  Goal Identifier: gait  Goal Description: Pt able to ambulate with upright trunk and neutral cervical spine.  Rationale: to maximize safety and independence within the home;to maximize safety and independence within the community  Target Date: 10/01/25  PT Goal 2  Goal Identifier: tandem stand  Goal Description: Tandem stand=10\"  Rationale: to maximize safety and independence within the home;to maximize safety and independence within the community  Goal Progress: Currently equals 1\"  Target Date: " 10/01/25      Frequency of Treatment: 1x/week tapering to 2x/month  Duration of Treatment: 9 weeks    Recommended Referrals to Other Professionals:   Education Assessment:   Learner/Method: Patient;No Barriers to Learning    Risks and benefits of evaluation/treatment have been explained.   Patient/Family/caregiver agrees with Plan of Care.     Evaluation Time:     PT Eval, Low Complexity Minutes (52864): 20       Signing Clinician: SHARLA Millan [facility]                                                                                   OUTPATIENT PHYSICAL THERAPY      PLAN OF TREATMENT FOR OUTPATIENT REHABILITATION   Patient's Last Name, First Name, DUSTINStuartMAGALIEStuart  Dylan Davila YOB: 1940   Provider's Name   [facility]   Medical Record No.  [id number]     Onset Date: 06/17/25  Start of Care Date: 07/30/25     Medical Diagnosis:  Parkinson's disease without dyskinesia or fluctuating manifestations (H)  Unstable gait      PT Treatment Diagnosis:  decreased gait skills Plan of Treatment  Frequency/Duration: 1x/week tapering to 2x/month/ 9 weeks    Certification date from 07/30/25 to 10/01/25         See note for plan of treatment details and functional goals     Jayshree Phoenix PT                         I CERTIFY THE NEED FOR THESE SERVICES FURNISHED UNDER        THIS PLAN OF TREATMENT AND WHILE UNDER MY CARE     (Physician attestation of this document indicates review and certification of the therapy plan).              Referring Provider:  Dereck Lomeli    Initial Assessment  See Epic Evaluation- Start of Care Date: 07/30/25

## 2025-08-06 ENCOUNTER — THERAPY VISIT (OUTPATIENT)
Dept: PHYSICAL THERAPY | Facility: CLINIC | Age: 85
End: 2025-08-06
Attending: INTERNAL MEDICINE
Payer: MEDICARE

## 2025-08-06 DIAGNOSIS — R26.89 BALANCE PROBLEMS: ICD-10-CM

## 2025-08-06 DIAGNOSIS — R26.9 ABNORMAL GAIT: Primary | ICD-10-CM

## 2025-08-06 PROCEDURE — 97116 GAIT TRAINING THERAPY: CPT | Mod: GP | Performed by: PHYSICAL THERAPIST

## 2025-08-06 PROCEDURE — 97110 THERAPEUTIC EXERCISES: CPT | Mod: GP | Performed by: PHYSICAL THERAPIST

## 2025-08-07 ENCOUNTER — VIRTUAL VISIT (OUTPATIENT)
Dept: INTERNAL MEDICINE | Facility: CLINIC | Age: 85
End: 2025-08-07
Payer: MEDICARE

## 2025-08-07 DIAGNOSIS — M81.0 AGE-RELATED OSTEOPOROSIS WITHOUT CURRENT PATHOLOGICAL FRACTURE: Primary | ICD-10-CM

## 2025-08-07 RX ORDER — ALENDRONATE SODIUM 70 MG/1
70 TABLET ORAL
Qty: 12 TABLET | Refills: 3 | Status: SHIPPED | OUTPATIENT
Start: 2025-08-07

## 2025-08-18 ENCOUNTER — ALLIED HEALTH/NURSE VISIT (OUTPATIENT)
Dept: DERMATOLOGY | Facility: CLINIC | Age: 85
End: 2025-08-18
Payer: MEDICARE

## 2025-08-18 ENCOUNTER — THERAPY VISIT (OUTPATIENT)
Dept: PHYSICAL THERAPY | Facility: CLINIC | Age: 85
End: 2025-08-18
Attending: INTERNAL MEDICINE
Payer: MEDICARE

## 2025-08-18 DIAGNOSIS — R26.89 BALANCE PROBLEMS: ICD-10-CM

## 2025-08-18 DIAGNOSIS — R26.9 ABNORMAL GAIT: Primary | ICD-10-CM

## 2025-08-18 DIAGNOSIS — Z51.89 VISIT FOR WOUND CHECK: Primary | ICD-10-CM

## 2025-08-18 PROCEDURE — 97110 THERAPEUTIC EXERCISES: CPT | Mod: GP | Performed by: PHYSICAL THERAPIST

## 2025-08-18 PROCEDURE — 99207 PR NO CHARGE NURSE ONLY: CPT | Performed by: STUDENT IN AN ORGANIZED HEALTH CARE EDUCATION/TRAINING PROGRAM
